# Patient Record
Sex: MALE | Race: WHITE | NOT HISPANIC OR LATINO | URBAN - METROPOLITAN AREA
[De-identification: names, ages, dates, MRNs, and addresses within clinical notes are randomized per-mention and may not be internally consistent; named-entity substitution may affect disease eponyms.]

---
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
## 2024-01-10 NOTE — ED PEDIATRIC NURSE NOTE - NSSISCREENINGQ4_ED_A_ED
No Patient is a 71y old  Female who presents with a chief complaint of UTI (10 Manuel 2024 08:07)    Patient seen and examined with family member at bedside. No acute overnight events. Mid-morning, patient's family member noted a faint rash at her neck/upper chest which felt similar to a rash she got after receiving PCN with ambulatory surgery many years ago - relayed to ID. Denies itching, SOB, trouble breathing/swallowing or any other concerns. She was able to keep down jello and ginger ale but still has persistent nausea. Family member reports that patient tends to have N/V when she is sick.    ALLERGIES:  penicillins (Hives)  Ativan (Rash; Urticaria)    MEDICATIONS  (STANDING):  cefTRIAXone   IVPB 1000 milliGRAM(s) IV Intermittent every 24 hours  dextrose 5%. 1000 milliLiter(s) (50 mL/Hr) IV Continuous <Continuous>  dextrose 5%. 1000 milliLiter(s) (100 mL/Hr) IV Continuous <Continuous>  dextrose 50% Injectable 25 Gram(s) IV Push once  dextrose 50% Injectable 25 Gram(s) IV Push once  dextrose 50% Injectable 12.5 Gram(s) IV Push once  famotidine  IVPB 20 milliGRAM(s) IV Intermittent daily  furosemide   Injectable 20 milliGRAM(s) IV Push two times a day  glucagon  Injectable 1 milliGRAM(s) IntraMuscular once  heparin   Injectable 5000 Unit(s) SubCutaneous every 8 hours  insulin glargine Injectable (LANTUS) 26 Unit(s) SubCutaneous at bedtime  insulin lispro (ADMELOG) corrective regimen sliding scale   SubCutaneous at bedtime  insulin lispro (ADMELOG) corrective regimen sliding scale   SubCutaneous three times a day before meals  lactated ringers. 1000 milliLiter(s) (75 mL/Hr) IV Continuous <Continuous>  levothyroxine Injectable 56 MICROGram(s) IV Push at bedtime    MEDICATIONS  (PRN):  dextrose Oral Gel 15 Gram(s) Oral once PRN Blood Glucose LESS THAN 70 milliGRAM(s)/deciliter  trimethobenzamide Injectable 200 milliGRAM(s) IntraMuscular every 8 hours PRN Nausea and/or Vomiting    Vital Signs Last 24 Hrs  T(F): 98.3 (10 Manuel 2024 12:49), Max: 98.4 (09 Jan 2024 23:58)  HR: 66 (10 Manuel 2024 12:49) (58 - 75)  BP: 116/61 (10 Manuel 2024 12:49) (116/61 - 151/75)  RR: 18 (10 Manuel 2024 12:49) (16 - 18)  SpO2: 95% (10 Manuel 2024 12:49) (94% - 99%)    I&O's Summary    BMI (kg/m2): 44.6 (01-08-24 @ 22:24), 44.6 (01-08-24 @ 00:20)    PHYSICAL EXAM:  General: NAD, lying in bed  Eyes: Anicteric  ENT: Moist mucous membranes  Neck: Supple, No JVD  Lungs: Clear to auscultation bilaterally, normal respiratory effort  Cardio: RRR, S1/S2, No murmurs  Abdomen: Soft, Nontender, Nondistended; Bowel sounds present  Extremities: No calf tenderness, No pitting edema, no digital cyanosis  Skin: Warm, dry  Psych: A&O x 3, follows commands    LABS:                        12.0   14.39 )-----------( 185      ( 10 Manuel 2024 05:55 )             37.5       01-10    134  |  105  |  59  ----------------------------<  201  4.4   |  22  |  2.70    Ca    8.6      10 Manuel 2024 05:55  Phos  4.5     01-09  Mg     2.5     01-10    TPro  7.4  /  Alb  3.2  /  TBili  0.5  /  DBili  x   /  AST  19  /  ALT  14  /  AlkPhos  69  01-10       PT/INR - ( 10 Manuel 2024 08:37 )   PT: 13.0 sec;   INR: 1.11 ratio         PTT - ( 10 Manuel 2024 08:37 )  PTT:21.5 sec   Lactate, Blood: 1.6 mmol/L (01-09 @ 01:23)        POCT Blood Glucose.: 208 mg/dL (10 Manuel 2024 12:28)  POCT Blood Glucose.: 194 mg/dL (10 Manuel 2024 08:23)  POCT Blood Glucose.: 248 mg/dL (09 Jan 2024 22:31)  POCT Blood Glucose.: 192 mg/dL (09 Jan 2024 17:32)      Culture - Blood (collected 09 Jan 2024 01:23)  Source: .Blood Blood  Preliminary Report (10 Manuel 2024 07:02):    No growth at 24 hours    Culture - Blood (collected 09 Jan 2024 01:18)  Source: .Blood Blood  Preliminary Report (10 Manuel 2024 07:02):    No growth at 24 hours    Culture - Urine (collected 08 Jan 2024 04:09)  Source: Clean Catch Clean Catch (Midstream)  Preliminary Report (09 Jan 2024 15:39):    >100,000 CFU/ml Klebsiella pneumoniae          RADIOLOGY & ADDITIONAL TESTS:   Personally reviewed.     Consultant(s) Notes Reviewed:  [x] YES  [ ] NO    Care Discussed with Consultants/Other Providers:

## 2024-03-12 ENCOUNTER — NON-APPOINTMENT (OUTPATIENT)
Age: 19
End: 2024-03-12

## 2024-03-13 ENCOUNTER — EMERGENCY (EMERGENCY)
Facility: HOSPITAL | Age: 19
LOS: 1 days | Discharge: HOME | End: 2024-03-13
Attending: EMERGENCY MEDICINE | Admitting: EMERGENCY MEDICINE
Payer: COMMERCIAL

## 2024-03-13 VITALS
HEART RATE: 96 BPM | RESPIRATION RATE: 18 BRPM | OXYGEN SATURATION: 99 % | HEIGHT: 70.5 IN | WEIGHT: 190.04 LBS | TEMPERATURE: 97 F | SYSTOLIC BLOOD PRESSURE: 136 MMHG | DIASTOLIC BLOOD PRESSURE: 88 MMHG

## 2024-03-13 LAB
ALBUMIN SERPL ELPH-MCNC: 4.5 G/DL — SIGNIFICANT CHANGE UP (ref 3.3–5)
ALP SERPL-CCNC: 84 U/L — SIGNIFICANT CHANGE UP (ref 40–120)
ALT FLD-CCNC: 28 U/L — SIGNIFICANT CHANGE UP (ref 10–45)
ANION GAP SERPL CALC-SCNC: 9 MMOL/L — SIGNIFICANT CHANGE UP (ref 5–17)
APAP SERPL-MCNC: <1 UG/ML — LOW (ref 10–30)
AST SERPL-CCNC: 23 U/L — SIGNIFICANT CHANGE UP (ref 10–40)
BASOPHILS # BLD AUTO: 0.08 K/UL — SIGNIFICANT CHANGE UP (ref 0–0.2)
BASOPHILS NFR BLD AUTO: 0.7 % — SIGNIFICANT CHANGE UP (ref 0–2)
BILIRUB SERPL-MCNC: 0.7 MG/DL — SIGNIFICANT CHANGE UP (ref 0.2–1.2)
BUN SERPL-MCNC: 18 MG/DL — SIGNIFICANT CHANGE UP (ref 7–23)
CALCIUM SERPL-MCNC: 9.4 MG/DL — SIGNIFICANT CHANGE UP (ref 8.4–10.5)
CHLORIDE SERPL-SCNC: 105 MMOL/L — SIGNIFICANT CHANGE UP (ref 96–108)
CO2 SERPL-SCNC: 28 MMOL/L — SIGNIFICANT CHANGE UP (ref 22–31)
CREAT SERPL-MCNC: 0.87 MG/DL — SIGNIFICANT CHANGE UP (ref 0.5–1.3)
EGFR: 127 ML/MIN/1.73M2 — SIGNIFICANT CHANGE UP
EOSINOPHIL # BLD AUTO: 0.18 K/UL — SIGNIFICANT CHANGE UP (ref 0–0.5)
EOSINOPHIL NFR BLD AUTO: 1.6 % — SIGNIFICANT CHANGE UP (ref 0–6)
ETHANOL SERPL-MCNC: <3 MG/DL — SIGNIFICANT CHANGE UP (ref 0–3)
GLUCOSE SERPL-MCNC: 98 MG/DL — SIGNIFICANT CHANGE UP (ref 70–99)
HCT VFR BLD CALC: 48.4 % — SIGNIFICANT CHANGE UP (ref 39–50)
HGB BLD-MCNC: 17.4 G/DL — HIGH (ref 13–17)
IMM GRANULOCYTES NFR BLD AUTO: 0.5 % — SIGNIFICANT CHANGE UP (ref 0–0.9)
LYMPHOCYTES # BLD AUTO: 2.76 K/UL — SIGNIFICANT CHANGE UP (ref 1–3.3)
LYMPHOCYTES # BLD AUTO: 23.8 % — SIGNIFICANT CHANGE UP (ref 13–44)
MCHC RBC-ENTMCNC: 31.3 PG — SIGNIFICANT CHANGE UP (ref 27–34)
MCHC RBC-ENTMCNC: 36 GM/DL — SIGNIFICANT CHANGE UP (ref 32–36)
MCV RBC AUTO: 87.1 FL — SIGNIFICANT CHANGE UP (ref 80–100)
MONOCYTES # BLD AUTO: 0.71 K/UL — SIGNIFICANT CHANGE UP (ref 0–0.9)
MONOCYTES NFR BLD AUTO: 6.1 % — SIGNIFICANT CHANGE UP (ref 2–14)
NEUTROPHILS # BLD AUTO: 7.8 K/UL — HIGH (ref 1.8–7.4)
NEUTROPHILS NFR BLD AUTO: 67.3 % — SIGNIFICANT CHANGE UP (ref 43–77)
NRBC # BLD: 0 /100 WBCS — SIGNIFICANT CHANGE UP (ref 0–0)
PLATELET # BLD AUTO: 246 K/UL — SIGNIFICANT CHANGE UP (ref 150–400)
POTASSIUM SERPL-MCNC: 4 MMOL/L — SIGNIFICANT CHANGE UP (ref 3.5–5.3)
POTASSIUM SERPL-SCNC: 4 MMOL/L — SIGNIFICANT CHANGE UP (ref 3.5–5.3)
PROT SERPL-MCNC: 7.3 G/DL — SIGNIFICANT CHANGE UP (ref 6–8.3)
RBC # BLD: 5.56 M/UL — SIGNIFICANT CHANGE UP (ref 4.2–5.8)
RBC # FLD: 11.1 % — SIGNIFICANT CHANGE UP (ref 10.3–14.5)
SALICYLATES SERPL-MCNC: 2 MG/DL — LOW (ref 3–30)
SODIUM SERPL-SCNC: 142 MMOL/L — SIGNIFICANT CHANGE UP (ref 135–145)
WBC # BLD: 11.59 K/UL — HIGH (ref 3.8–10.5)
WBC # FLD AUTO: 11.59 K/UL — HIGH (ref 3.8–10.5)

## 2024-03-13 PROCEDURE — 80053 COMPREHEN METABOLIC PANEL: CPT

## 2024-03-13 PROCEDURE — 90792 PSYCH DIAG EVAL W/MED SRVCS: CPT | Mod: 95,GC

## 2024-03-13 PROCEDURE — 96374 THER/PROPH/DIAG INJ IV PUSH: CPT

## 2024-03-13 PROCEDURE — 73130 X-RAY EXAM OF HAND: CPT

## 2024-03-13 PROCEDURE — 80307 DRUG TEST PRSMV CHEM ANLYZR: CPT

## 2024-03-13 PROCEDURE — 85025 COMPLETE CBC W/AUTO DIFF WBC: CPT

## 2024-03-13 PROCEDURE — 73130 X-RAY EXAM OF HAND: CPT | Mod: 26,RT

## 2024-03-13 PROCEDURE — 99285 EMERGENCY DEPT VISIT HI MDM: CPT

## 2024-03-13 PROCEDURE — 96375 TX/PRO/DX INJ NEW DRUG ADDON: CPT

## 2024-03-13 PROCEDURE — 99284 EMERGENCY DEPT VISIT MOD MDM: CPT | Mod: 25

## 2024-03-13 PROCEDURE — 36415 COLL VENOUS BLD VENIPUNCTURE: CPT

## 2024-03-13 RX ORDER — HALOPERIDOL DECANOATE 100 MG/ML
2 INJECTION INTRAMUSCULAR ONCE
Refills: 0 | Status: COMPLETED | OUTPATIENT
Start: 2024-03-13 | End: 2024-03-13

## 2024-03-13 RX ADMIN — HALOPERIDOL DECANOATE 2 MILLIGRAM(S): 100 INJECTION INTRAMUSCULAR at 20:12

## 2024-03-13 RX ADMIN — Medication 1 MILLIGRAM(S): at 19:42
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
## 2024-03-13 NOTE — ED BEHAVIORAL HEALTH ASSESSMENT NOTE - DESCRIPTION
none Mother gave him xanax 0.25mg prior to arrival, and then upon arrival was given ativan 1mg IV and later haldol 2mg IV.      Vital Signs Last 24 Hrs  T(C): 36.3 (13 Mar 2024 18:42), Max: 36.3 (13 Mar 2024 18:42)  T(F): 97.3 (13 Mar 2024 18:42), Max: 97.3 (13 Mar 2024 18:42)  HR: 96 (13 Mar 2024 18:42) (96 - 96)  BP: 136/88 (13 Mar 2024 18:42) (136/88 - 136/88)  BP(mean): --  RR: 18 (13 Mar 2024 18:42) (18 - 18)  SpO2: 99% (13 Mar 2024 18:42) (99% - 99%)    Parameters below as of 13 Mar 2024 18:42  Patient On (Oxygen Delivery Method): room air see hpi no significant PMHx per pt or parents

## 2024-03-13 NOTE — ED ADULT TRIAGE NOTE - SOURCE OF INFORMATION
Subjective:   Antonio Briones is a 22 y.o. male who presents for Leg Injury (L calf muscle )      HPI: This is a 19-year-old male who presents today for work note.  Patient reports that he developed pain to his left lower leg 2 days ago after carrying armoire up a set of stairs.  Patient reports pain centered over left calf muscle.  He reports having to call into work secondary to pain.  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  He denies pain today.  No numbness or tingling to lower extremity.  Patient reports full resolution in pain.      ROS per HPI    Medications:    Current Outpatient Medications on File Prior to Visit   Medication Sig Dispense Refill    ondansetron (ZOFRAN ODT) 4 MG TABLET DISPERSIBLE Take 1 Tablet by mouth every 6 hours as needed for Nausea/Vomiting for up to 15 doses. (Patient not taking: Reported on 3/12/2023) 15 Tablet 0    ondansetron (ZOFRAN ODT) 4 MG TABLET DISPERSIBLE Take 1 Tablet by mouth every 6 hours as needed for Nausea. (Patient not taking: Reported on 1/18/2023) 10 Tablet 0    famotidine (PEPCID) 20 MG Tab Take 1 Tablet by mouth 2 times a day. (Patient not taking: Reported on 1/18/2023) 60 Tablet 0     No current facility-administered medications on file prior to visit.        Allergies:   Patient has no known allergies.    Problem List:   There is no problem list on file for this patient.       Surgical History:  No past surgical history on file.    Past Social Hx:   Social History     Tobacco Use    Smoking status: Every Day     Packs/day: 0.20     Years: 2.00     Pack years: 0.40     Types: Cigarettes    Smokeless tobacco: Never   Vaping Use    Vaping Use: Never used   Substance Use Topics    Alcohol use: Yes     Alcohol/week: 10.8 oz     Types: 14 Glasses of wine, 4 Shots of liquor per  "week    Drug use: Not Currently     Frequency: 7.0 times per week     Types: Marijuana, Inhaled     Comment: marijuana, cocaine occ          Problem list, medications, and allergies reviewed by myself today in Epic.     Objective:     /74 (BP Location: Left arm, Patient Position: Sitting, BP Cuff Size: Adult)   Pulse (!) 103   Temp 37 °C (98.6 °F) (Temporal)   Resp 16   Ht 1.753 m (5' 9\")   Wt 81.6 kg (180 lb)   SpO2 97%   BMI 26.58 kg/m²     Physical Exam  Vitals and nursing note reviewed.   Constitutional:       General: He is not in acute distress.     Appearance: Normal appearance. He is normal weight. He is not ill-appearing, toxic-appearing or diaphoretic.   HENT:      Head: Normocephalic and atraumatic.   Cardiovascular:      Rate and Rhythm: Normal rate and regular rhythm.      Pulses: Normal pulses.      Heart sounds: Normal heart sounds.   Pulmonary:      Effort: Pulmonary effort is normal. No respiratory distress.      Breath sounds: Normal breath sounds. No stridor. No wheezing, rhonchi or rales.   Chest:      Chest wall: No tenderness.   Musculoskeletal:      Cervical back: Normal range of motion.      Left lower leg: No swelling, deformity, lacerations, tenderness or bony tenderness. No edema.   Skin:     General: Skin is warm and dry.      Capillary Refill: Capillary refill takes less than 2 seconds.   Neurological:      General: No focal deficit present.      Mental Status: He is alert and oriented to person, place, and time. Mental status is at baseline.      Motor: No weakness.      Gait: Gait normal.   Psychiatric:         Mood and Affect: Mood normal.         Behavior: Behavior normal.         Thought Content: Thought content normal.         Judgment: Judgment normal.       Assessment/Plan:     Diagnosis and associated orders:   1. Muscle strain               Comments/MDM:   Pt is clinically stable at today's acute urgent care visit.  No acute distress noted. Appropriate for " outpatient management at this time.     Acute problem.  Physical exam is unremarkable today.  Patient reports full resolution in his pain after resting, elevating, ice application, and taking ibuprofen.  Work note provided per patient request. Patient is to return to   for any new or worsening signs or symptoms, and follow with with PCP for recheck. Patient is agreeable with plan of care and verbalizes good understanding.             Discussed DDx, management options (risks,benefits, and alternatives to planned treatment), natural progression and supportive care.  Expressed understanding and the treatment plan was agreed upon. Questions were encouraged and answered   Return to urgent care prn if new or worsening sx or if there is no improvement in condition prn.    Educated in Red flags and indications to immediately call 911 or present to the Emergency Department.   Advised the patient to follow-up with the primary care physician for recheck, reevaluation, and consideration of further management.    I personally reviewed prior external notes and test results pertinent to today's visit.  I have independently reviewed and interpreted all diagnostics ordered during this urgent care acute visit.     Please note that this dictation was created using voice recognition software. I have made a reasonable attempt to correct obvious errors, but I expect that there are errors of grammar and possibly content that I did not discover before finalizing the note.    This note was electronically signed by EHSAN Rose     Patient/Mother/Father

## 2024-03-13 NOTE — ED ADULT NURSE NOTE - OBJECTIVE STATEMENT
Patient came from home with complaint of R hand pain after punching his hand on a computer screen. Patient complaint of R hand pain. Patient mom gave 0.25mg Xanax. PMH of autism

## 2024-03-13 NOTE — ED ADULT NURSE NOTE - NSFALLUNIVINTERV_ED_ALL_ED
Bed/Stretcher in lowest position, wheels locked, appropriate side rails in place/Call bell, personal items and telephone in reach/Instruct patient to call for assistance before getting out of bed/chair/stretcher/Non-slip footwear applied when patient is off stretcher/Tye to call system/Physically safe environment - no spills, clutter or unnecessary equipment/Purposeful proactive rounding/Room/bathroom lighting operational, light cord in reach

## 2024-03-13 NOTE — ED PROVIDER NOTE - CARE PLAN
1 Principal Discharge DX:	Autism  Secondary Diagnosis:	Encounter for medication management in attention deficit hyperactivity disorder (ADHD)
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
## 2024-03-13 NOTE — ED BEHAVIORAL HEALTH ASSESSMENT NOTE - DETAILS
denies any recent or remote SI, plan or intent.  denies any past SAs or NSSIB spoke with ED attending spoke with parents

## 2024-03-13 NOTE — ED ADULT TRIAGE NOTE - CHIEF COMPLAINT QUOTE
As per parents pt had a break down where he was angry and punched the computer screen. Pt c/o right hand pain. Mom states pt has autism. Mom gave pt .25mg xanax.

## 2024-03-13 NOTE — ED PROVIDER NOTE - PHYSICAL EXAMINATION
General:     NAD, well-nourished, well-appearing  Head:     NC/AT, EOMI, oral mucosa moist  Neck:     supple  Lungs:     CTA b/l, no w/r/r  CVS:     S1S2, RRR, no m/g/r  Abd:     +BS, s/nt/nd, no organomegaly  Ext:    2+ radial and pedal pulses, no c/c/e  right hand no gross deformity , minor abrasions on dorsal aspect   Neuro: grossly intact
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
## 2024-03-13 NOTE — ED PROVIDER NOTE - PATIENT PORTAL LINK FT
You can access the FollowMyHealth Patient Portal offered by [facility] by registering at the following website: [email or web address]. By joining [facility]’s FollowMyHealth portal, you will also be able to view your health information using other applications (apps) compatible with our system.

## 2024-03-14 ENCOUNTER — OUTPATIENT (OUTPATIENT)
Dept: OUTPATIENT SERVICES | Facility: HOSPITAL | Age: 19
LOS: 1 days | Discharge: STILL ADMITTED OR ADMINISTRATIVE | End: 2024-03-14
Payer: COMMERCIAL

## 2024-03-14 VITALS
SYSTOLIC BLOOD PRESSURE: 121 MMHG | DIASTOLIC BLOOD PRESSURE: 74 MMHG | RESPIRATION RATE: 17 BRPM | OXYGEN SATURATION: 97 % | HEART RATE: 78 BPM

## 2024-03-14 DIAGNOSIS — F90.9 ATTENTION-DEFICIT HYPERACTIVITY DISORDER, UNSPECIFIED TYPE: ICD-10-CM

## 2024-03-14 DIAGNOSIS — F84.0 AUTISTIC DISORDER: ICD-10-CM

## 2024-03-14 PROCEDURE — 99214 OFFICE O/P EST MOD 30 MIN: CPT

## 2024-03-14 NOTE — CHART NOTE - NSCHARTNOTEFT_GEN_A_CORE
19 y/o male with h/o Autism BIB family c/o he is very angry and agitated and restless for few hours, he punched his computer and smashed it , and injured his right hand . Mother gave him 0.25 mg Xanax before arrival as per chart.  Patient was cleared by Tele Psych and recommendation made to follow up with his outpatient services and [facility] for additional support.  The patient's mother asked for assistance in finding a therapist pending the  setup of [facility] services.  Mother, Skye was agreeable to a scheduled appointment at [facility] today 3/14/24 @ 2:15.

## 2024-03-14 NOTE — ED BEHAVIORAL HEALTH PROGRESS NOTE - BILLING CODES
Severe Malnutrition in the context of Chronic Illness/Injury    Related to (etiology):  cancer    Signs and Symptoms (as evidenced by):  Energy Intake: <75% of estimated energy requirement for > 3 months  Body Fat Depletion: moderate depletion of orbitals, triceps and thoracic and lumbar region   Muscle Mass Depletion: severe depletion of temples, clavicle region, scapular region, interosseous muscle and lower extremities   Weight Loss:12% x 3 months     Interventions:  Collaboration with other providers    Nutrition Diagnosis Status:  New      
27574-[facility] hospital care - moderate complexity 30-39 minutes

## 2024-03-14 NOTE — ED BEHAVIORAL HEALTH PROGRESS NOTE - DETAILS:
Patient seen this morning. He is awake and alert, very happy appearing, no distress. Father is at bedside. Confirms pt is currently at his baseline and does not have any safety concerns w/ his returning home. Pt denies any complaints currently.

## 2024-03-14 NOTE — ED BEHAVIORAL HEALTH PROGRESS NOTE - SUMMARY
17yo male domiciled with mother and father, has PPHx of autism, ADHD, no past psychiatric hospitalizations, no past SAs, no past NSSIB; + hx of physical aggression (punching, kicking) toward parents when he is frustrated, however no hx of intentional violence, no past legal issues.  Currently in outpt treatment with developmental pediatrician Dr. Kasper, but concurrently seeing a psychiatrist Dr. Alonso, is taking Lithium, Seroquel, Pristiq, and xanax.  Patient was BIB parents after he punched his computer monitor due to frustration with computer issues.  Mother gave him xanax 0.25mg prior to arrival, and then upon arrival was given ativan 1mg IV and later haldol 2mg IV.    On evaluation patient is sedated secondary to effects of ativan and haldol.  He is able to participate in some parts of the interview and he does not appear acutely manic or psychotic; he also denies any suicidal or homicidal ideations, plans or intent.  He denies any suicidal or self-harm intent when he punched the computer or window.  He also denies any homicidal intent or aggressive ideations or plans to harm others.  He has chronic impulsivity and low frustration tolerance secondary to autism and ADHD, and his behaviors today are not any change from his baseline.  He has responded well to his current outpatient regimen, however he still is getting dysregulated and having outbursts about once per week.  Although he has a hx of aggression toward people (his family), he has not engaged in this in several months (last time was August 2023). he is engaged in treatment with a developmental pediatrician and is enrolled in [facility], however services have not been started yet.  Although patient has intermittent outbursts that place him and others at harm, these are chronic and related to impulsivity, and would not be amenable to inpatient psychiatric hospitalization.  Overall he would not benefit from inpatient psychiatric treatment due to his autism spectrum symptoms including difficulty tolerating groups of people, new environments, changes in his structure, having to share living space, staying in a locked-in environment, etc.  For these reasons, in addition to the nature of his outbursts being sporadic and not persistent, patient would benefit more from returning home and to outpatient providers, with plan to followup with [facility] for further treatment.  At this time however, he is not able to fully engage in meaningful interview secondary to sedation from medications.  He will need to hold in the ED, metabolize medications, and be reevaluated by psychiatry.

## 2024-03-14 NOTE — ED BEHAVIORAL HEALTH PROGRESS NOTE - LACKING INFO FOR PSYCH DISPO DETAILS FREE TEXT
ensure no events overnight, patient remains calm and has returned to baseline. Then discuss w/ parents possibility of returning home.

## 2024-03-14 NOTE — ED BEHAVIORAL HEALTH PROGRESS NOTE - REFERRAL / APPOINTMENT DETAILS
will provide [facility] crisis clinic information. Pt is already connected to outpatient services including [facility].

## 2024-03-14 NOTE — ED BEHAVIORAL HEALTH PROGRESS NOTE - CASE SUMMARY/FORMULATION (CLEARLY DOCUMENT RATIONALE FOR DISPOSITION CHANGE)
As noted previously, this is a patient with diagnosed Autism and prior behavioral episodes and aggressive outbursts in the context of poor frustration tolerance and impulsivity. He well connected to outpatient care including recently being accepted into [facility] (though pending those services). He is on medications at home to help control his symptoms. Yesterday's incident does not appear to be an acute change from pt's baseline but rather in the context of ongoing difficulties with symptom control as an outpatient. However, pt's mood has returned to baseline, and he is currently calm in behavioral control, and does not present any acute danger. Therefore he would not benefit from psychiatric hospitalization at this time and can follow up w/ his outpatient services. We can also provide referral to [facility] for additional support. As noted previously, this is a patient with diagnosed Autism and prior behavioral episodes and aggressive outbursts in the context of poor frustration tolerance and impulsivity. He well connected to outpatient care including recently being accepted into [facility] (though pending those services). He is on medications at home to help control his symptoms. Yesterday's incident does not appear to be an acute change from pt's baseline but rather in the context of ongoing difficulties with symptom control as an outpatient. However, pt's mood has returned to baseline, and he is currently calm in behavioral control, and does not present any acute danger. Therefore he would not benefit from psychiatric hospitalization at this time and can follow up w/ his outpatient services. We can also provide referral to [facility] for additional support.

## 2024-03-14 NOTE — ED BEHAVIORAL HEALTH PROGRESS NOTE - DETAILS
pt with limited ability to participate meaningfully. Has supportive and involved parents - parents provided with thorough psychoeducation and about crisis management. d/w parents

## 2024-03-21 ENCOUNTER — EMERGENCY (EMERGENCY)
Facility: HOSPITAL | Age: 19
LOS: 1 days | Discharge: HOME | End: 2024-03-21
Attending: EMERGENCY MEDICINE | Admitting: EMERGENCY MEDICINE
Payer: COMMERCIAL

## 2024-03-21 VITALS
SYSTOLIC BLOOD PRESSURE: 135 MMHG | HEART RATE: 95 BPM | HEIGHT: 70.5 IN | TEMPERATURE: 98 F | RESPIRATION RATE: 18 BRPM | DIASTOLIC BLOOD PRESSURE: 86 MMHG | WEIGHT: 179.9 LBS | OXYGEN SATURATION: 98 %

## 2024-03-21 PROCEDURE — 99284 EMERGENCY DEPT VISIT MOD MDM: CPT

## 2024-03-21 PROCEDURE — 99283 EMERGENCY DEPT VISIT LOW MDM: CPT

## 2024-03-21 PROCEDURE — 73140 X-RAY EXAM OF FINGER(S): CPT | Mod: 26,RT

## 2024-03-21 PROCEDURE — 73140 X-RAY EXAM OF FINGER(S): CPT

## 2024-03-21 NOTE — ED PROVIDER NOTE - NSFOLLOWUPINSTRUCTIONS_ED_ALL_ED_FT
-Continue your medications as prescribed by your psychiatrist  -Return to any emergency room for worsening symptoms or for feelings of suicide or self-harm or hurting others  -Follow-up with your psychiatrist and psychologist in the next 1 to 2 days    -Take Advil as needed for finger pain

## 2024-03-21 NOTE — ED PROVIDER NOTE - PHYSICAL EXAMINATION
exam:   General: well appearing, NAD. calm coherent, cooperative  HEENT: eyes perrl, nose normal, head without swelling/tenderness/ discoloration or other signs of trauma  cor: RRR, s1s2, 2+rad pulses.   lungs: ctabl, no resp distress.   abd: soft, ntnd.   neuro: a&ox3, cn2-12 intact, JACOME, 5/5 strength c nl sensation all extremities, nl coordination.   MSK: no extremity swelling. mild R 1st mcp joint tenderness. FROM without discomfort. FROM ip/mcp  Skin: normal, no rash

## 2024-03-21 NOTE — ED PROVIDER NOTE - PATIENT PORTAL LINK FT
You can access the FollowMyHealth Patient Portal offered by [facility] by registering at the following website: [email or web address]. By joining [employer]’s FollowMyHealth portal, you will also be able to view your health information using other applications (apps) compatible with our system.

## 2024-03-21 NOTE — ED ADULT NURSE NOTE - NSICDXPASTMEDICALHX_GEN_ALL_CORE_FT
PAST MEDICAL HISTORY:  Attention deficit hyperactivity disorder (ADHD), unspecified ADHD type     Autism     Autism

## 2024-03-21 NOTE — ED ADULT NURSE NOTE - NSFALLUNIVINTERV_ED_ALL_ED
Bed/Stretcher in lowest position, wheels locked, appropriate side rails in place/Call bell, personal items and telephone in reach/Instruct patient to call for assistance before getting out of bed/chair/stretcher/Non-slip footwear applied when patient is off stretcher/[place] to call system/Physically safe environment - no spills, clutter or unnecessary equipment/Purposeful proactive rounding/Room/bathroom lighting operational, light cord in reach
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
## 2024-03-21 NOTE — ED PROVIDER NOTE - NSICDXPASTMEDICALHX_GEN_ALL_CORE_FT
PAST MEDICAL HISTORY:  Attention deficit hyperactivity disorder (ADHD), unspecified ADHD type     Autism     Autism     
RX MVI, Vit C
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
## 2024-04-11 DIAGNOSIS — F84.0 AUTISTIC DISORDER: ICD-10-CM

## 2024-04-11 DIAGNOSIS — F41.1 GENERALIZED ANXIETY DISORDER: ICD-10-CM

## 2024-10-20 ENCOUNTER — INPATIENT (INPATIENT)
Facility: HOSPITAL | Age: 19
LOS: 53 days | Discharge: HOME | DRG: 883 | End: 2024-12-13
Attending: INTERNAL MEDICINE | Admitting: INTERNAL MEDICINE
Payer: COMMERCIAL

## 2024-10-20 VITALS
DIASTOLIC BLOOD PRESSURE: 89 MMHG | WEIGHT: 205.03 LBS | HEART RATE: 140 BPM | SYSTOLIC BLOOD PRESSURE: 120 MMHG | OXYGEN SATURATION: 97 % | TEMPERATURE: 98 F | RESPIRATION RATE: 20 BRPM | HEIGHT: 70 IN

## 2024-10-20 DIAGNOSIS — F84.0 AUTISTIC DISORDER: ICD-10-CM

## 2024-10-20 LAB
ALBUMIN SERPL ELPH-MCNC: 4.7 G/DL — SIGNIFICANT CHANGE UP (ref 3.3–5)
ALP SERPL-CCNC: 115 U/L — SIGNIFICANT CHANGE UP (ref 40–120)
ALT FLD-CCNC: 25 U/L — SIGNIFICANT CHANGE UP (ref 10–45)
ANION GAP SERPL CALC-SCNC: 11 MMOL/L — SIGNIFICANT CHANGE UP (ref 5–17)
APAP SERPL-MCNC: <1 UG/ML — LOW (ref 10–30)
AST SERPL-CCNC: 25 U/L — SIGNIFICANT CHANGE UP (ref 10–40)
BASOPHILS # BLD AUTO: 0.06 K/UL — SIGNIFICANT CHANGE UP (ref 0–0.2)
BASOPHILS NFR BLD AUTO: 0.6 % — SIGNIFICANT CHANGE UP (ref 0–2)
BILIRUB SERPL-MCNC: 0.8 MG/DL — SIGNIFICANT CHANGE UP (ref 0.2–1.2)
BUN SERPL-MCNC: 12 MG/DL — SIGNIFICANT CHANGE UP (ref 7–23)
CALCIUM SERPL-MCNC: 9.4 MG/DL — SIGNIFICANT CHANGE UP (ref 8.4–10.5)
CHLORIDE SERPL-SCNC: 104 MMOL/L — SIGNIFICANT CHANGE UP (ref 96–108)
CO2 SERPL-SCNC: 27 MMOL/L — SIGNIFICANT CHANGE UP (ref 22–31)
CREAT SERPL-MCNC: 1 MG/DL — SIGNIFICANT CHANGE UP (ref 0.5–1.3)
EGFR: 110 ML/MIN/1.73M2 — SIGNIFICANT CHANGE UP
EOSINOPHIL # BLD AUTO: 0.02 K/UL — SIGNIFICANT CHANGE UP (ref 0–0.5)
EOSINOPHIL NFR BLD AUTO: 0.2 % — SIGNIFICANT CHANGE UP (ref 0–6)
ETHANOL SERPL-MCNC: <3 MG/DL — SIGNIFICANT CHANGE UP (ref 0–3)
GLUCOSE SERPL-MCNC: 113 MG/DL — HIGH (ref 70–99)
HCT VFR BLD CALC: 47.8 % — SIGNIFICANT CHANGE UP (ref 39–50)
HGB BLD-MCNC: 16.8 G/DL — SIGNIFICANT CHANGE UP (ref 13–17)
IMM GRANULOCYTES NFR BLD AUTO: 0.3 % — SIGNIFICANT CHANGE UP (ref 0–0.9)
LYMPHOCYTES # BLD AUTO: 0.99 K/UL — LOW (ref 1–3.3)
LYMPHOCYTES # BLD AUTO: 10.3 % — LOW (ref 13–44)
MCHC RBC-ENTMCNC: 31.2 PG — SIGNIFICANT CHANGE UP (ref 27–34)
MCHC RBC-ENTMCNC: 35.1 GM/DL — SIGNIFICANT CHANGE UP (ref 32–36)
MCV RBC AUTO: 88.8 FL — SIGNIFICANT CHANGE UP (ref 80–100)
MONOCYTES # BLD AUTO: 0.89 K/UL — SIGNIFICANT CHANGE UP (ref 0–0.9)
MONOCYTES NFR BLD AUTO: 9.3 % — SIGNIFICANT CHANGE UP (ref 2–14)
NEUTROPHILS # BLD AUTO: 7.63 K/UL — HIGH (ref 1.8–7.4)
NEUTROPHILS NFR BLD AUTO: 79.3 % — HIGH (ref 43–77)
NRBC # BLD: 0 /100 WBCS — SIGNIFICANT CHANGE UP (ref 0–0)
PLATELET # BLD AUTO: 216 K/UL — SIGNIFICANT CHANGE UP (ref 150–400)
POTASSIUM SERPL-MCNC: 4.4 MMOL/L — SIGNIFICANT CHANGE UP (ref 3.5–5.3)
POTASSIUM SERPL-SCNC: 4.4 MMOL/L — SIGNIFICANT CHANGE UP (ref 3.5–5.3)
PROT SERPL-MCNC: 7.4 G/DL — SIGNIFICANT CHANGE UP (ref 6–8.3)
RBC # BLD: 5.38 M/UL — SIGNIFICANT CHANGE UP (ref 4.2–5.8)
RBC # FLD: 11.6 % — SIGNIFICANT CHANGE UP (ref 10.3–14.5)
SALICYLATES SERPL-MCNC: <0.2 MG/DL — LOW (ref 3–30)
SARS-COV-2 RNA SPEC QL NAA+PROBE: SIGNIFICANT CHANGE UP
SODIUM SERPL-SCNC: 142 MMOL/L — SIGNIFICANT CHANGE UP (ref 135–145)
WBC # BLD: 9.62 K/UL — SIGNIFICANT CHANGE UP (ref 3.8–10.5)
WBC # FLD AUTO: 9.62 K/UL — SIGNIFICANT CHANGE UP (ref 3.8–10.5)

## 2024-10-20 PROCEDURE — 70450 CT HEAD/BRAIN W/O DYE: CPT | Mod: 26,MC

## 2024-10-20 PROCEDURE — 99285 EMERGENCY DEPT VISIT HI MDM: CPT

## 2024-10-20 NOTE — ED PROVIDER NOTE - NS ED ROS FT
Except as otherwise indicated in HPI:  CONSTITUTIONAL: Neg  HEENT: neg  CV: neg  Resp: neg  GI: Neg  : Neg  MSK: Neg  SKIN: Neg  NEURO: Neg  PSYCHIATRIC: +aggression, si  Heme/Onc: Neg
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
## 2024-10-20 NOTE — ED PROVIDER NOTE - PHYSICAL EXAMINATION
Gen:  alert, awake, no acute distress  Head:  atraumatic, normocephalic  HEENT: PERRLA, EOMI, normal nose, normal oropharynx, no tonsillar edema, erythema, or exudate  CV:    Regular, tachycardic, nl S1, S2, no m/r/g  Pulm:  lungs CTA b/l  Abd: s/nt/nd, +BS  MSK:  moving all extremities, no back midline ttp, no stepoffs, no cva TTP  Neuro:  grossly intact, no focal deficits  Skin:  clear, dry, intact  Psych: AOx3, normal affect, no apparent risk to self or others, no pressured speech
Patient expressed no known problems or needs

## 2024-10-20 NOTE — ED ADULT NURSE NOTE - NSFALLUNIVINTERV_ED_ALL_ED
Bed/Stretcher in lowest position, wheels locked, appropriate side rails in place/Call bell, personal items and telephone in reach/Instruct patient to call for assistance before getting out of bed/chair/stretcher/Non-slip footwear applied when patient is off stretcher/[place] to call system/Physically safe environment - no spills, clutter or unnecessary equipment/Purposeful proactive rounding/Room/bathroom lighting operational, light cord in reach

## 2024-10-20 NOTE — ED BEHAVIORAL HEALTH ASSESSMENT NOTE - VIOLENCE PROTECTIVE FACTORS:
The following changes were made to your medications today:   Start Midodrine 5mg twice a day - 1st thing in the morning and again approximately 3PM  Wear compression socks daily and use lymphedema pump daily for 1 hour twice daily.    The following lifestyle modifications are encouraged:  Continue regular exercise at least 30 minutes daily.  Lowfat/Low Cholesterol diet advised.   Low sodium diet <2000mgs/day and fluid restrictions of <2liters/day (640z/day or 6-8 cups 8oz liquids per day).  Daily weight checks and call if you notice weight gain of 3lbs in a day or 5lbs in a week.    The following tests have been ordered:  Fasting labs prior to seeing .        Follow up with primary cardiologist,   in July.    Additional Educational Resources:  For additional resources regarding your symptoms, diagnosis, or further health information, please visit the Health Resources section on [email or web address] or the Online Health Resources section in [facility].       Residential stability/Sobriety/Engagement in treatment/Insight into violence risk and need for management/treatment
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
## 2024-10-20 NOTE — ED BEHAVIORAL HEALTH ASSESSMENT NOTE - NSPRESENTSXS_PSY_ALL_CORE
Patient reports black stools since yesterday. Patient feels generally weak. Denies abdominal pain. Patient is pale.
Impulsivity

## 2024-10-20 NOTE — ED PROVIDER NOTE - PROGRESS NOTE DETAILS
Patient was signed out pending telepsych evaluation.  They stated the patient is going to be held on the morning to like get collateral from family.  Overnight the patient remained calm with no interventions required.  Will sign out to the day team pending final telepsych recommendations Servando Attending Physician   Patient signed out to me by Dr. Watts pending psych AM reevaluation. In summary 19M with history of autism, ADHD and history of agitation/aggression presents with agitation. Psych unable to obtain collateral and is waiting for morning reassessment. Servando Attending Physician   Discussed with telepsych and patient does not meet inpatient psych criteria; however patient is unable to go back to his parents' house because his parents are concerned about his aggression. Telepsych is arranging for [facility] for crisis housing. Discussed with hospitalist and admission accepted.
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
## 2024-10-21 DIAGNOSIS — R46.89 OTHER SYMPTOMS AND SIGNS INVOLVING APPEARANCE AND BEHAVIOR: ICD-10-CM

## 2024-10-21 PROCEDURE — 99233 SBSQ HOSP IP/OBS HIGH 50: CPT

## 2024-10-21 RX ORDER — ONDANSETRON HYDROCHLORIDE 4 MG/1
4 TABLET, FILM COATED ORAL EVERY 8 HOURS
Refills: 0 | Status: DISCONTINUED | OUTPATIENT
Start: 2024-10-21 | End: 2024-11-26

## 2024-10-21 RX ORDER — SODIUM CHLORIDE 0.65 %
1 AEROSOL, SPRAY (ML) NASAL DAILY
Refills: 0 | Status: DISCONTINUED | OUTPATIENT
Start: 2024-10-21 | End: 2024-11-26

## 2024-10-21 RX ORDER — MAGNESIUM, ALUMINUM HYDROXIDE 200-225/5
30 SUSPENSION, ORAL (FINAL DOSE FORM) ORAL EVERY 4 HOURS
Refills: 0 | Status: DISCONTINUED | OUTPATIENT
Start: 2024-10-21 | End: 2024-11-26

## 2024-10-21 RX ORDER — DESVENLAFAXINE SUCCINATE 50 MG/1
100 TABLET, EXTENDED RELEASE ORAL DAILY
Refills: 0 | Status: DISCONTINUED | OUTPATIENT
Start: 2024-10-21 | End: 2024-10-22

## 2024-10-21 RX ORDER — ACETAMINOPHEN 500MG 500 MG/1
650 TABLET, COATED ORAL EVERY 6 HOURS
Refills: 0 | Status: DISCONTINUED | OUTPATIENT
Start: 2024-10-21 | End: 2024-11-26

## 2024-10-21 RX ORDER — ACETAMINOPHEN, DIPHENHYDRAMINE HCL, PHENYLEPHRINE HCL 325; 25; 5 MG/1; MG/1; MG/1
3 TABLET ORAL AT BEDTIME
Refills: 0 | Status: DISCONTINUED | OUTPATIENT
Start: 2024-10-21 | End: 2024-11-26

## 2024-10-21 RX ORDER — LITHIUM CARBONATE 300 MG/1
600 CAPSULE ORAL DAILY
Refills: 0 | Status: DISCONTINUED | OUTPATIENT
Start: 2024-10-21 | End: 2024-11-26

## 2024-10-21 RX ADMIN — DESVENLAFAXINE SUCCINATE 100 MILLIGRAM(S): 50 TABLET, EXTENDED RELEASE ORAL at 22:53

## 2024-10-21 RX ADMIN — Medication 300 MILLIGRAM(S): at 20:55

## 2024-10-21 RX ADMIN — LITHIUM CARBONATE 600 MILLIGRAM(S): 300 CAPSULE ORAL at 22:53

## 2024-10-21 RX ADMIN — Medication 1 SPRAY(S): at 22:54

## 2024-10-21 NOTE — PATIENT PROFILE ADULT - LIVING ENVIRONMENT
Date of Service: 12/17/20    CC: Right knee pain and possible deep infection    HPI: Barry Torres is a 80 y.o. male who presents with complaints of pain to right knee.  He has a history of a right knee debridement from a submeniscal cyst per his description.  This was 6 weeks ago.  His wounds have since gone on to heal.  He is followed up in the clinic with  and has an appointment scheduled for next week.  A ground-level fall and injury to his head brought him to the hospital where he was admitted and worked up for possible sepsis.  Initial aspiration of his right knee showed a cell count of 1700 but did grow back Pseudomonas and cultures.  The pain is 0/10 to the right knee and is described as dull.  The pain is made worse by palpation of the area and made better by rest and immobilization.    PMH:   Past Medical History:   Diagnosis Date   • Abnormal thyroid stimulating hormone (TSH) level    • Allergic rhinitis    • Asbestosis (HCC)    • Asthma    • Bronchitis    • Chronic diarrhea     declines eval; takes immodium once a day usually and sometimes less; see previous notes; aware of risk    • Chronic low back pain    • Chronic lung disease     asbestos related   • CKD (chronic kidney disease), stage III     sees neph, one kidney   • COPD (chronic obstructive pulmonary disease) ([facility])    • Coronary artery calcification seen on CAT scan 8/2/2016    sees cards   • Epididymitis 2009    h/o listed in chart   • GERD (gastroesophageal reflux disease)    • History of hemorrhoids    • History of skin cancer     non melanoma   • [place] (hard of hearing)     declines hearing aids   • Hypercholesteremia    • Hypertension    • Hypertriglyceridemia    • Indigestion    • Light headedness     2/2 orthostasis? now better off hctz   • Lightheadedness 6/23/2016   • Low back pain    • Nasal drainage    • Olecranon bursitis    • Orthostasis 6/23/2016    better off HCTZ   • Peripheral neuropathy    • Pleural plaque  2005     [facility] [place]   • Post-nasal drip    • Pulmonary emphysema (HCC)    • RA (rheumatoid arthritis) (HCC)     on meds, sees rheum   • Restless leg syndrome    • Rheumatoid arthritis (HCC)    • Sleep apnea     obstructive and central - not adherent to autopap   • Solitary kidney     history of kidney cyst leading to non-functioning kidney s/p nephrectomy        PSH:   Past Surgical History:   Procedure Laterality Date   • IRRIGATION & DEBRIDEMENT ORTHO Right 10/27/2020    Procedure: IRRIGATION AND DEBRIDEMENT, WOUND - KNEE OPEN;  Surgeon: Elijah Faulkner M.D.;  Location: SURGERY [facility];  Service: Orthopedics   • FLEXOR TENDON REPAIR Left 4/3/2019    Procedure: FLEXOR TENDON REPAIR- SMALL FINGER VS;  Surgeon: Marc Chowdhury M.D.;  Location: SURGERY [facility];  Service: Orthopedics   • TENDON TRANSFER Left 4/3/2019    Procedure: TENDON TRANSFER;  Surgeon: Marc Chowdhury M.D.;  Location: SURGERY [facility];  Service: Orthopedics   • COLONOSCOPY  11/10/2018    Procedure: COLONOSCOPY;  Surgeon: Ganesh Peacock M.D.;  Location: SURGERY [facility];  Service: Gastroenterology   • NASAL POLYPECTOMY Bilateral 10/6/2015    Procedure: NASAL POLYPECTOMY WITH SCOTT ENDOSCOPIC NASAL DEBRIDEMENT;  Surgeon: Param Maurer M.D.;  Location: SURGERY SAME DAY [facility];  Service:    • CYSTOSCOPY  2/1/2010    Performed by ANGIE VERDUZCO at [facility]   • RETROGRADES  2/1/2010    Performed by ANGIE VERDUZCO at [facility]   • PYELOGRAM  2/1/2010    Performed by ANGIE VERDUZCO at [facility]   • URETEROSCOPY  2/1/2010    Performed by ANGIE VERDUZCO at [facility]   • NEPHRECTOMY LAPAROSCOPIC  2009    left kidney   • TESTICLE EXPLORATION  2004   • PB REMV 2ND CATARACT,CORN-SCLER SECTN     • TONSILLECTOMY         FH:   Family History   Problem Relation Age of Onset   • Genetic Disorder Father         ALS   • No Known  "Problems Sister    • No Known Problems Son    • No Known Problems Daughter    • Heart Attack Neg Hx    • Heart Disease Neg Hx    • Heart Failure Neg Hx        SH:   Social History     Socioeconomic History   • Marital status:      Spouse name: Not on file   • Number of children: Not on file   • Years of education: Not on file   • Highest education level: Not on file   Occupational History   • Not on file   Social Needs   • Financial resource strain: Not on file   • Food insecurity     Worry: Not on file     Inability: Not on file   • Transportation needs     Medical: Not on file     Non-medical: Not on file   Tobacco Use   • Smoking status: Former Smoker     Packs/day: 1.00     Years: 40.00     Pack years: 40.00     Types: Cigarettes     Quit date: 1980     Years since quittin.9   • Smokeless tobacco: Never Used   • Tobacco comment: 1 pk a day for 40 yrs   Substance and Sexual Activity   • Alcohol use: No     Alcohol/week: 0.0 oz   • Drug use: No   • Sexual activity: Never     Partners: Female     Comment: retired    Lifestyle   • Physical activity     Days per week: Not on file     Minutes per session: Not on file   • Stress: Not on file   Relationships   • Social connections     Talks on phone: Not on file     Gets together: Not on file     Attends Latter day service: Not on file     Active member of club or organization: Not on file     Attends meetings of clubs or organizations: Not on file     Relationship status: Not on file   • Intimate partner violence     Fear of current or ex partner: Not on file     Emotionally abused: Not on file     Physically abused: Not on file     Forced sexual activity: Not on file   Other Topics Concern   • Not on file   Social History Narrative    See H&P    Lives with wife       ROS: A 10 system review of systems was negative outside what was listed in the HPI    /78   Pulse 95   Temp 37 °C (98.6 °F) (Temporal)   Resp 17   Ht 1.854 m (6' 1\")   Wt 92.8 kg " (204 lb 9.4 oz)   SpO2 92%     Physical Exam:  General: AAOx3, NAD  HEENT: normocephalic, atraumatic  Psych: Normal mood and affect  Neck: supple, no pain to motion  Chest/Pulmonary: breathing unlabored, no audible wheezing  Cardio: regular heart rate and rhythm  Neuro: sensation grossly intact to BUE and BLE, moving all four extremities  Skin: Healing wounds to the right knee and leg with Steri-Strips in place.  There is no active drainage.  MSK: There is erythema to the right leg as well as some pitting edema.  Palpation of the right leg as well as the left lower extremity bilateral extremities is nontender.  There is no areas of fluctuance or abscesses.    Imaging and labs: Initial aspiration of the right knee had cell count of 1700 and cultures positive for Pseudomonas repeat aspiration and cell counts are pending  Recent Labs     12/16/20  1334 12/17/20  0109   WBC 13.4* 12.0*   RBC 4.08* 3.50*   HEMOGLOBIN 13.1* 11.1*   HEMATOCRIT 42.1 35.1*   .2* 100.3*   MCH 32.1 31.7   RDW 57.6* 54.9*   PLATELETCT 169 168   MPV 9.7 10.0   NEUTSPOLYS 85.90*  --    LYMPHOCYTES 4.60*  --    MONOCYTES 8.40  --    EOSINOPHILS 0.10  --    BASOPHILS 0.60  --          Assessment: Healing right knee incisions and right knee effusion    We discussed the diagnosis and findings with the patient at length.  We reviewed possible non operative and operative interventions and the risks and benefits of these.  Currently I would expect his Pseudomonas to be a contaminant as a white cell count of 1700 his orders of magnitude below would be expected for a septic joint.  He also does not have exam consistent with a septic joint.  He had a chance to ask questions and all of these were answered to his satisfaction.        Plan:  No operative plans at this time  Await repeat aspirate of right knee findings  Would currently consider the Pseudomonas a contaminant  Weightbearing and activity to the right knee as tolerated or per   Lucie     yes
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
## 2024-10-21 NOTE — H&P ADULT - NSCORESITESY/N_GEN_A_CORE_RD
From: Lenka Zavaleta  To: Agustin Duke  Sent: 11/22/2021 1:13 PM CST  Subject: Severe nausea     Can’t keep anything down. When I try to eat something light or even just drinking liquids . Can I get something for the nausea to help me through the day .     Ty  
Yes
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
## 2024-10-21 NOTE — ED BEHAVIORAL HEALTH PROGRESS NOTE - SUMMARY
Post-Anesthesia Evaluation and Assessment    Patient: Jeffery Crowe MRN: [id number]  SSN: [id number]    YOB: 1944  Age: 75 y.o. Sex: male       Cardiovascular Function/Vital Signs  Visit Vitals    /74    Pulse 74    Temp 37 °C (98.6 °F)    Resp 30    Ht 5' 10\" (1.778 m)    Wt 65.8 kg (145 lb)    SpO2 98%    BMI 20.81 kg/m2       Patient is status post total IV anesthesia anesthesia for Procedure(s):  COLONOSCOPY / BMI=21. Nausea/Vomiting: None    Postoperative hydration reviewed and adequate. Pain:  Pain Scale 1: Numeric (0 - 10) (02/12/18 1042)  Pain Intensity 1: 0 (02/12/18 1042)   Managed    Neurological Status: At baseline    Mental Status and Level of Consciousness: Arousable    Pulmonary Status:   O2 Device: Room air (02/12/18 1042)   Adequate oxygenation and airway patent    Complications related to anesthesia: None    Post-anesthesia assessment completed.  No concerns    Signed By: Alli Velazquez MD     February 12, 2018
see prior assessment

## 2024-10-21 NOTE — H&P ADULT - ASSESSMENT
19y Male, PMH of autism, ADHD, history of episodes of agitation and aggression brought in by EMS from home for agitation.    #Agitation with hx of ADHD, autism  - Will place in observation  - Telepsych consult appreciated  - Will consult psychiatry Akash Morrow  - Will place SW consult  - SW is working with SW from [facility] to find crisis housing  - Will c/w home medications  - Noted CT brain normal, labs all WNL  - Will check TSH/FT4 in AM    DVT PPX   AM Labs  Full code  DISP: Pending crisis housing  19y Male, PMH of autism, ADHD, history of episodes of agitation and aggression brought in by EMS from home for agitation.    #Agitation with hx of ADHD, autism  - Will place in observation  - Telepsych consult appreciated  - Will consult psychiatry Akash Morrow  - Will place SW consult  - JUDI is working with SW from [facility] to find crisis housing  - I called [facility] [address] to obtain med rec; he does not know his medications; he is one lithium ER 300mg daily, pristiq ER 100mg daily, seroquel 300mg BID; we do not have pristiq here in the hospital; will see what psychiatry recommends  - Noted CT brain normal, labs all WNL  - Will check TSH/FT4 in AM    DVT PPX   AM Labs  Full code  DISP: Pending crisis housing  19y Male, PMH of autism, ADHD, history of episodes of agitation and aggression brought in by EMS from home for agitation.    #Agitation with hx of ADHD, autism  - Will place in observation  - Telepsych consult appreciated  - Will consult psychiatry Akash Morrow  - C/w constant observation until psychiatry recommends otherwise  - Will place SW consult  - SW is working with SW from [employer] to find crisis housing  - I called [facility] [address] to obtain med rec; he does not know his medications; he is one lithium ER 300mg daily, pristiq ER 100mg daily, seroquel 300mg BID; we do not have pristiq here in the hospital; will see what psychiatry recommends  - Noted CT brain normal, labs all WNL  - Will check TSH/FT4 in AM    DVT PPX   AM Labs  Full code  DISP: Pending crisis housing

## 2024-10-21 NOTE — ED BEHAVIORAL HEALTH PROGRESS NOTE - DETAILS
pt able to participate slightly. Talks about taking a walk outside or calling therapist as coping strategies. d/w parents
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
## 2024-10-21 NOTE — H&P ADULT - NSHPPHYSICALEXAM_GEN_ALL_CORE
Vital Signs Last 24 Hrs  T(F): 98.1 (21 Oct 2024 09:35), Max: 99.4 (20 Oct 2024 19:10)  HR: 100 (21 Oct 2024 09:35) (90 - 140)  BP: 132/87 (21 Oct 2024 09:35) (120/89 - 134/70)  RR: 18 (21 Oct 2024 03:39) (18 - 20)  SpO2: 98% (21 Oct 2024 03:39) (96% - 98%)    PHYSICAL EXAM:  GENERAL: NAD, well-groomed, well-developed  HEAD:  Atraumatic, Normocephalic  EYES: EOMI, conjunctiva and sclera clear  ENMT: Moist mucous membranes, Good dentition, no thrush  NECK: Supple, No JVD  CHEST/LUNG: Clear to auscultation bilaterally, good air entry, non-labored breathing  HEART: RRR; S1/S2, No murmur  ABDOMEN: Soft, Nontender, Nondistended; Bowel sounds present  VASCULAR: Normal pulses, Normal capillary refill  EXTREMITIES: No calf tenderness, No cyanosis, No edema  LYMPH: Normal; No lymphadenopathy noted  SKIN: Warm, Intact  PSYCH: Normal mood, Normal affect  NERVOUS SYSTEM:  A/O x3, Good concentration; CN 2-12 intact, No focal deficits

## 2024-10-21 NOTE — H&P ADULT - NSHPLABSRESULTS_GEN_ALL_CORE
16.8   9.62  )-----------( 216      ( 20 Oct 2024 15:10 )             47.8       10-20    142  |  104  |  12  ----------------------------<  113  4.4   |  27  |  1.00    Ca    9.4      20 Oct 2024 15:10    TPro  7.4  /  Alb  4.7  /  TBili  0.8  /  DBili  x   /  AST  25  /  ALT  25  /  AlkPhos  115  10-20    Urinalysis Basic - ( 20 Oct 2024 15:10 )    Color: x / Appearance: x / SG: x / pH: x  Gluc: 113 mg/dL / Ketone: x  / Bili: x / Urobili: x   Blood: x / Protein: x / Nitrite: x   Leuk Esterase: x / RBC: x / WBC x   Sq Epi: x / Non Sq Epi: x / Bacteria: x    COVID-19 PCR: NotDetec (10-20-24 @ 15:10)  COVID-19 PCR: NotDetec (20 Oct 2024 15:10)    12 Lead ECG:   Ventricular Rate 120 BPM    Atrial Rate 120 BPM    P-R Interval 130 ms    QRS Duration 88 ms    Q-T Interval 278 ms    QTC Calculation(Bazett) 392 ms    P Axis 51 degrees    R Axis 56 degrees    T Axis 48 degrees    Diagnosis Line Sinus tachycardia  Otherwise normal ECG  No previous ECGs available  Confirmed by TOMÁS STOREY, ANSHUL CLEMENTE ([id number]) on 10/21/2024 8:33:58 AM (10-20-24 @ 14:56)    RADIOLOGY  CT Head No Cont (10.20.24 @ 15:35) >  Impression:  Sinus mucosal thickening otherwise unremarkable noncontrast head CT.    Consultant(s) Notes Reveiwed [ x] Yes   Telepsych  Care Discussed with [x ] Consultants  [ ]x Patient  [ ] Family  [ ] /   [ ] Other; RN

## 2024-10-21 NOTE — PATIENT PROFILE ADULT - FALL HARM RISK - HARM RISK INTERVENTIONS
constant observation/Communicate Risk of Fall with Harm to all staff/Monitor gait and stability/Reinforce activity limits and safety measures with patient and family/Tailored Fall Risk Interventions/Visual Cue: Yellow wristband and red socks/Bed in lowest position, wheels locked, appropriate side rails in place/Call bell, personal items and telephone in reach/Instruct patient to call for assistance before getting out of bed or chair/Non-slip footwear when patient is out of bed/[place] to call system/Physically safe environment - no spills, clutter or unnecessary equipment/Purposeful Proactive Rounding/Room/bathroom lighting operational, light cord in reach
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
## 2024-10-21 NOTE — ED BEHAVIORAL HEALTH PROGRESS NOTE - DETAILS:
Patient has been in good behavioral control. On interview today he is calm, cooperative, not in acute emotional distress. He reports fair mood, denies any specific complaints. He is very concrete on conversation, sometimes repeats certain words/phrases. He does demonstrate understanding of his behavior and is still very apologetic. He is aware of his physical outburst but has difficulty articulating the triggering factors or describing his emotions. Currently does not have any violent thoughts and engages slightly in some safety planning, able to recognize that he can call his therapist in the future if he were to become upset again. Patient does say he worries about his own impulsivity and states the home environment is triggering because he wants to be more independent, and at home he cannot always do things his way. Patient expresses clearly his desire to be ultimately placed in a group home/independent housing.

## 2024-10-22 DIAGNOSIS — F63.81 INTERMITTENT EXPLOSIVE DISORDER: ICD-10-CM

## 2024-10-22 LAB
ANION GAP SERPL CALC-SCNC: 11 MMOL/L — SIGNIFICANT CHANGE UP (ref 5–17)
BUN SERPL-MCNC: 11 MG/DL — SIGNIFICANT CHANGE UP (ref 7–23)
CALCIUM SERPL-MCNC: 9.4 MG/DL — SIGNIFICANT CHANGE UP (ref 8.4–10.5)
CHLORIDE SERPL-SCNC: 105 MMOL/L — SIGNIFICANT CHANGE UP (ref 96–108)
CO2 SERPL-SCNC: 27 MMOL/L — SIGNIFICANT CHANGE UP (ref 22–31)
CREAT SERPL-MCNC: 1 MG/DL — SIGNIFICANT CHANGE UP (ref 0.5–1.3)
EGFR: 110 ML/MIN/1.73M2 — SIGNIFICANT CHANGE UP
GLUCOSE SERPL-MCNC: 138 MG/DL — HIGH (ref 70–99)
LITHIUM SERPL-MCNC: <0.2 MMOL/L — LOW (ref 0.6–1.2)
MAGNESIUM SERPL-MCNC: 2.2 MG/DL — SIGNIFICANT CHANGE UP (ref 1.6–2.6)
POTASSIUM SERPL-MCNC: 3.6 MMOL/L — SIGNIFICANT CHANGE UP (ref 3.5–5.3)
POTASSIUM SERPL-SCNC: 3.6 MMOL/L — SIGNIFICANT CHANGE UP (ref 3.5–5.3)
SODIUM SERPL-SCNC: 143 MMOL/L — SIGNIFICANT CHANGE UP (ref 135–145)
T4 FREE SERPL-MCNC: 0.9 NG/DL — SIGNIFICANT CHANGE UP (ref 0.9–1.8)
TSH SERPL-MCNC: 0.67 UIU/ML — SIGNIFICANT CHANGE UP (ref 0.36–3.74)

## 2024-10-22 PROCEDURE — 99232 SBSQ HOSP IP/OBS MODERATE 35: CPT

## 2024-10-22 RX ORDER — DESVENLAFAXINE SUCCINATE 50 MG/1
50 TABLET, EXTENDED RELEASE ORAL DAILY
Refills: 0 | Status: DISCONTINUED | OUTPATIENT
Start: 2024-10-23 | End: 2024-11-26

## 2024-10-22 RX ORDER — TUBERCULIN,PURIF.PROT.DERIV. 250/0.1ML
5 VIAL (ML) INTRADERMAL ONCE
Refills: 0 | Status: COMPLETED | OUTPATIENT
Start: 2024-10-22 | End: 2024-10-22

## 2024-10-22 RX ADMIN — DESVENLAFAXINE SUCCINATE 100 MILLIGRAM(S): 50 TABLET, EXTENDED RELEASE ORAL at 11:24

## 2024-10-22 RX ADMIN — Medication 300 MILLIGRAM(S): at 21:24

## 2024-10-22 RX ADMIN — Medication 5 UNIT(S): at 15:29

## 2024-10-22 RX ADMIN — Medication 300 MILLIGRAM(S): at 05:30

## 2024-10-22 RX ADMIN — LITHIUM CARBONATE 600 MILLIGRAM(S): 300 CAPSULE ORAL at 11:24

## 2024-10-22 NOTE — BH CONSULTATION LIAISON PROGRESS NOTE - NSBHCONSULTRECOMMENDOTHER_PSY_A_CORE FT
Decrease Pristiq to 50 mg  Start Propranolol 10 mg bid   continue Lithium for now  Attempt to get collateral from prescribing physician regarding past medication trials.   no need to repeat Lithium level.   Continue Quetiapine.  Decrease Pristiq to 50 mg  Start Propranolol 10 mg bid   continue Lithium for now  Attempt to get collateral from prescribing physician regarding past medication trials.   no need to repeat Lithium level.   Continue Quetiapine.   Contact for care coordinator working with [facility] for filing paperwork for respite   Ms. Mesa Reji [phone].

## 2024-10-22 NOTE — PROGRESS NOTE ADULT - SUBJECTIVE AND OBJECTIVE BOX
CC: Patient is a 19y old  Male who presents with a chief complaint of Placement (21 Oct 2024 12:13)      Interval History:  Patient seen and examined at bedside.  No overnight events  No complaints this morning    ALLERGIES:  No Known Allergies    MEDICATIONS  (STANDING):  lithium SR (LITHOBID) 600 milliGRAM(s) Oral daily  QUEtiapine 300 milliGRAM(s) Oral two times a day    MEDICATIONS  (PRN):  acetaminophen     Tablet .. 650 milliGRAM(s) Oral every 6 hours PRN Temp greater or equal to 38C (100.4F), Mild Pain (1 - 3)  aluminum hydroxide/magnesium hydroxide/simethicone Suspension 30 milliLiter(s) Oral every 4 hours PRN Dyspepsia  melatonin 3 milliGRAM(s) Oral at bedtime PRN Insomnia  ondansetron Injectable 4 milliGRAM(s) IV Push every 8 hours PRN Nausea and/or Vomiting  sodium chloride 0.65% Nasal 1 Spray(s) Both Nostrils daily PRN Nasal Congestion    Vital Signs Last 24 Hrs  T(F): 98.7 (22 Oct 2024 14:37), Max: 99 (21 Oct 2024 17:27)  HR: 98 (22 Oct 2024 14:37) (87 - 99)  BP: 117/76 (22 Oct 2024 14:37) (112/78 - 124/75)  RR: 18 (22 Oct 2024 14:37) (18 - 18)  SpO2: 94% (22 Oct 2024 14:37) (93% - 94%)  I&O's Summary    21 Oct 2024 07:01  -  22 Oct 2024 07:00  --------------------------------------------------------  IN: 0 mL / OUT: 1 mL / NET: -1 mL    22 Oct 2024 07:01  -  22 Oct 2024 15:59  --------------------------------------------------------  IN: 600 mL / OUT: 0 mL / NET: 600 mL      BMI (kg/m2): 29.4 (10-20-24 @ 13:51)    PHYSICAL EXAM:  GENERAL: NAD  NERVOUS SYSTEM:  CN II - XII intact; Sensation intact; follows commands  CHEST/LUNG: Clear to percussion bilaterally; No rales, rhonchi, wheezing, or rubs; normal respiratory effort, no intercostal retractions  HEART: Regular rate and rhythm; No murmurs, rubs, or gallops; No pitting edema  ABDOMEN: Soft, Nontender, Nondistended; Bowel sounds present; No HSM or masses  MUSCULOSKELETAL/EXTREMITIES:  2+ Peripheral Pulses, No clubbing or digital cyanosis; FROM of extremeties (pain, crepitation or contracture)  PSYCH: Appropriate affect, Alert & Oriented x 3,   LABS:                        16.8   9.62  )-----------( 216      ( 20 Oct 2024 15:10 )             47.8       10-22    143  |  105  |  11  ----------------------------<  138  3.6   |  27  |  1.00    Ca    9.4      22 Oct 2024 07:24  Mg     2.2     10-22    TPro  7.4  /  Alb  4.7  /  TBili  0.8  /  DBili  x   /  AST  25  /  ALT  25  /  AlkPhos  115  10-20                  TSH 0.667   TSH with FT4 reflex --  Total T3 --                  Urinalysis Basic - ( 22 Oct 2024 07:24 )    Color: x / Appearance: x / SG: x / pH: x  Gluc: 138 mg/dL / Ketone: x  / Bili: x / Urobili: x   Blood: x / Protein: x / Nitrite: x   Leuk Esterase: x / RBC: x / WBC x   Sq Epi: x / Non Sq Epi: x / Bacteria: x        COVID-19 PCR: NotDetec (10-20-24 @ 15:10)      Care Discussed with Consultants/Other Providers: Yes. Discussed with Psychiatry

## 2024-10-22 NOTE — PROGRESS NOTE ADULT - ASSESSMENT
19y Male, PMH of autism, ADHD, history of episodes of agitation and aggression brought in by EMS from home for agitation.    #Agitation with hx of ADHD, autism  -psychiatry consult appreciated  - C/w constant observation  for now  - medications adjusted, on Lithium 600 mg daily, Pristiq 50 mg daily and Seroquel  - arrangements are being made for discharge  - followed by Social Work  -PPD placed    DVT PPX    Full code  DISP: Pending crisis housing

## 2024-10-22 NOTE — BH CONSULTATION LIAISON PROGRESS NOTE - NSBHCONSULTPRIMARYDISCUSSYES_PSY_A_CORE FT
case discussed in team rounds.   Awaits PPD for placement.   Continue coordination with [facility]  and [facility] Social work staff for respite housing.
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
## 2024-10-23 LAB
GAMMA INTERFERON BACKGROUND BLD IA-ACNC: 0.04 IU/ML — SIGNIFICANT CHANGE UP
M TB IFN-G BLD-IMP: NEGATIVE — SIGNIFICANT CHANGE UP
M TB IFN-G CD4+ BCKGRND COR BLD-ACNC: 0 IU/ML — SIGNIFICANT CHANGE UP
M TB IFN-G CD4+CD8+ BCKGRND COR BLD-ACNC: 0 IU/ML — SIGNIFICANT CHANGE UP
QUANT TB PLUS MITOGEN MINUS NIL: >10 IU/ML — SIGNIFICANT CHANGE UP

## 2024-10-23 PROCEDURE — 99232 SBSQ HOSP IP/OBS MODERATE 35: CPT

## 2024-10-23 RX ADMIN — DESVENLAFAXINE SUCCINATE 50 MILLIGRAM(S): 50 TABLET, EXTENDED RELEASE ORAL at 11:54

## 2024-10-23 RX ADMIN — ONDANSETRON HYDROCHLORIDE 4 MILLIGRAM(S): 4 TABLET, FILM COATED ORAL at 15:17

## 2024-10-23 RX ADMIN — LITHIUM CARBONATE 600 MILLIGRAM(S): 300 CAPSULE ORAL at 11:53

## 2024-10-23 RX ADMIN — Medication 300 MILLIGRAM(S): at 17:32

## 2024-10-23 RX ADMIN — Medication 300 MILLIGRAM(S): at 06:53

## 2024-10-23 NOTE — PROGRESS NOTE ADULT - SUBJECTIVE AND OBJECTIVE BOX
CC: Patient is a 19y old  Male who presents with a chief complaint of Placement (22 Oct 2024 15:59)      Interval History:  Patient seen and examined at bedside.  No overnight events  No complaints this morning  Denies CP, SOB, abd pain, N/V/D, dizziness    ALLERGIES:  No Known Allergies    MEDICATIONS  (STANDING):  desvenlafaxine ER 50 milliGRAM(s) Oral daily  lithium SR (LITHOBID) 600 milliGRAM(s) Oral daily  QUEtiapine 300 milliGRAM(s) Oral two times a day    MEDICATIONS  (PRN):  acetaminophen     Tablet .. 650 milliGRAM(s) Oral every 6 hours PRN Temp greater or equal to 38C (100.4F), Mild Pain (1 - 3)  aluminum hydroxide/magnesium hydroxide/simethicone Suspension 30 milliLiter(s) Oral every 4 hours PRN Dyspepsia  melatonin 3 milliGRAM(s) Oral at bedtime PRN Insomnia  ondansetron Injectable 4 milliGRAM(s) IV Push every 8 hours PRN Nausea and/or Vomiting  sodium chloride 0.65% Nasal 1 Spray(s) Both Nostrils daily PRN Nasal Congestion    Vital Signs Last 24 Hrs  T(F): 98.5 (23 Oct 2024 12:21), Max: 98.7 (22 Oct 2024 19:30)  HR: 94 (23 Oct 2024 12:21) (76 - 94)  BP: 130/81 (23 Oct 2024 12:21) (119/77 - 130/81)  RR: 17 (23 Oct 2024 12:21) (16 - 18)  SpO2: 96% (23 Oct 2024 12:21) (94% - 97%)  I&O's Summary    22 Oct 2024 07:01  -  23 Oct 2024 07:00  --------------------------------------------------------  IN: 600 mL / OUT: 0 mL / NET: 600 mL    23 Oct 2024 07:01  -  23 Oct 2024 14:58  --------------------------------------------------------  IN: 620 mL / OUT: 0 mL / NET: 620 mL      BMI (kg/m2): 29.4 (10-20-24 @ 13:51)    PHYSICAL EXAM:  GENERAL: NAD  NERVOUS SYSTEM:  CN II - XII intact; Sensation intact; follows commands  CHEST/LUNG: Clear to percussion bilaterally; No rales, rhonchi, wheezing, or rubs; normal respiratory effort, no intercostal retractions  HEART: Regular rate and rhythm; No murmurs, rubs, or gallops; No pitting edema  ABDOMEN: Soft, Nontender, Nondistended; Bowel sounds present; No HSM or masses  MUSCULOSKELETAL/EXTREMITIES:  2+ Peripheral Pulses, No clubbing or digital cyanosis; FROM of extremeties (pain, crepitation or contracture)  PSYCH: Appropriate affect, Alert & Oriented x 3, Good Memory; Good insight    LABS:                        16.8   9.62  )-----------( 216      ( 20 Oct 2024 15:10 )             47.8       10-22    143  |  105  |  11  ----------------------------<  138  3.6   |  27  |  1.00    Ca    9.4      22 Oct 2024 07:24  Mg     2.2     10-22    TPro  7.4  /  Alb  4.7  /  TBili  0.8  /  DBili  x   /  AST  25  /  ALT  25  /  AlkPhos  115  10-20                  TSH 0.667   TSH with FT4 reflex --  Total T3 --                  Urinalysis Basic - ( 22 Oct 2024 07:24 )    Color: x / Appearance: x / SG: x / pH: x  Gluc: 138 mg/dL / Ketone: x  / Bili: x / Urobili: x   Blood: x / Protein: x / Nitrite: x   Leuk Esterase: x / RBC: x / WBC x   Sq Epi: x / Non Sq Epi: x / Bacteria: x        COVID-19 PCR: NotDetec (10-20-24 @ 15:10)      Care Discussed with Consultants/Other Providers: Yes
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
## 2024-10-24 LAB — DRUG SCREEN, SERUM: SIGNIFICANT CHANGE UP

## 2024-10-24 PROCEDURE — 99232 SBSQ HOSP IP/OBS MODERATE 35: CPT

## 2024-10-24 PROCEDURE — 99233 SBSQ HOSP IP/OBS HIGH 50: CPT

## 2024-10-24 RX ADMIN — LITHIUM CARBONATE 600 MILLIGRAM(S): 300 CAPSULE ORAL at 11:28

## 2024-10-24 RX ADMIN — DESVENLAFAXINE SUCCINATE 50 MILLIGRAM(S): 50 TABLET, EXTENDED RELEASE ORAL at 11:28

## 2024-10-24 RX ADMIN — Medication 300 MILLIGRAM(S): at 17:29

## 2024-10-24 RX ADMIN — Medication 5 UNIT(S): at 11:39

## 2024-10-24 RX ADMIN — Medication 300 MILLIGRAM(S): at 08:28

## 2024-10-24 NOTE — PROGRESS NOTE ADULT - SUBJECTIVE AND OBJECTIVE BOX
Patient is a 19y old  Male who presents with a chief complaint of Placement (23 Oct 2024 14:58)    Patient seen and examined at bedside.  S: w/o new complaints.     ALLERGIES:  No Known Allergies    MEDICATIONS:  acetaminophen     Tablet .. 650 milliGRAM(s) Oral every 6 hours PRN  aluminum hydroxide/magnesium hydroxide/simethicone Suspension 30 milliLiter(s) Oral every 4 hours PRN  desvenlafaxine ER 50 milliGRAM(s) Oral daily  lithium SR (LITHOBID) 600 milliGRAM(s) Oral daily  melatonin 3 milliGRAM(s) Oral at bedtime PRN  ondansetron Injectable 4 milliGRAM(s) IV Push every 8 hours PRN  QUEtiapine 300 milliGRAM(s) Oral two times a day  sodium chloride 0.65% Nasal 1 Spray(s) Both Nostrils daily PRN    Vital Signs Last 24 Hrs  T(F): 97.9 (24 Oct 2024 06:00), Max: 98.1 (23 Oct 2024 19:08)  HR: 69 (24 Oct 2024 06:00) (69 - 93)  BP: 109/68 (24 Oct 2024 06:00) (109/68 - 116/72)  RR: 16 (24 Oct 2024 06:00) (16 - 17)  SpO2: 95% (24 Oct 2024 06:00) (95% - 96%)  I&O's Summary    23 Oct 2024 07:01  -  24 Oct 2024 07:00  --------------------------------------------------------  IN: 620 mL / OUT: 0 mL / NET: 620 mL        PHYSICAL EXAM:  General: NAD, A/O x 3  ENT: MMM  Lungs: Clear to auscultation bilaterally   Cardio: RR, S1/S2, No murmurs  Abdomen: Soft, NT/ND, Normal active Bowel Sounds   Extremities: No cyanosis, No edema      LABS:    10-22    143  |  105  |  11  ----------------------------<  138  3.6   |  27  |  1.00    Ca    9.4      22 Oct 2024 07:24  Mg     2.2     10-22      TSH 0.667   TSH with FT4 reflex --  Total T3 --      Urinalysis Basic - ( 22 Oct 2024 07:24 )  Color: x / Appearance: x / SG: x / pH: x  Gluc: 138 mg/dL / Ketone: x  / Bili: x / Urobili: x   Blood: x / Protein: x / Nitrite: x   Leuk Esterase: x / RBC: x / WBC x   Sq Epi: x / Non Sq Epi: x / Bacteria: x      COVID-19 PCR: NotDetec (10-20-24 @ 15:10)      Care Discussed with Consultants/Other Providers:   VIKAS Isaac discussed case and plan with Dr. Juan.

## 2024-10-24 NOTE — BH CONSULTATION LIAISON PROGRESS NOTE - NSBHCONSULTRECOMMENDOTHER_PSY_A_CORE FT
Continue Lithium 600 mg total dose. ( despite low level would defer increasing his dose).   Continue Pristiq 50 mg  Continue Quetiapine 300 mg bid.     Consider long term Propranolol.
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
## 2024-10-25 PROCEDURE — 99233 SBSQ HOSP IP/OBS HIGH 50: CPT

## 2024-10-25 RX ADMIN — Medication 300 MILLIGRAM(S): at 17:47

## 2024-10-25 RX ADMIN — LITHIUM CARBONATE 600 MILLIGRAM(S): 300 CAPSULE ORAL at 13:24

## 2024-10-25 RX ADMIN — Medication 300 MILLIGRAM(S): at 07:47

## 2024-10-25 RX ADMIN — DESVENLAFAXINE SUCCINATE 50 MILLIGRAM(S): 50 TABLET, EXTENDED RELEASE ORAL at 13:24

## 2024-10-25 NOTE — DIETITIAN INITIAL EVALUATION ADULT - OTHER INFO
Patient noted with fairly good appetite, consuming % of meals at this time per nursing flowsheets. Denies any chewing/swallowing difficulties. Food preferences obtained and noted on patients file with nutrition office. Electrolytes stable at this time.

## 2024-10-25 NOTE — PROGRESS NOTE ADULT - SUBJECTIVE AND OBJECTIVE BOX
Patient is a 19y old  Male who presents with a chief complaint of Other symptom or sign involving appearance or behavior     (25 Oct 2024 11:35)    Patient seen and examined at bedside.  S: w/o new complaints.     ALLERGIES:  No Known Allergies    MEDICATIONS:  acetaminophen     Tablet .. 650 milliGRAM(s) Oral every 6 hours PRN  aluminum hydroxide/magnesium hydroxide/simethicone Suspension 30 milliLiter(s) Oral every 4 hours PRN  desvenlafaxine ER 50 milliGRAM(s) Oral daily  lithium SR (LITHOBID) 600 milliGRAM(s) Oral daily  melatonin 3 milliGRAM(s) Oral at bedtime PRN  ondansetron Injectable 4 milliGRAM(s) IV Push every 8 hours PRN  QUEtiapine 300 milliGRAM(s) Oral two times a day  sodium chloride 0.65% Nasal 1 Spray(s) Both Nostrils daily PRN    Vital Signs Last 24 Hrs  T(F): 98.1 (25 Oct 2024 05:48), Max: 98.6 (24 Oct 2024 19:33)  HR: 71 (25 Oct 2024 05:48) (71 - 101)  BP: 113/72 (25 Oct 2024 05:48) (113/72 - 136/86)  RR: 17 (25 Oct 2024 05:48) (16 - 17)  SpO2: 95% (25 Oct 2024 05:48) (93% - 95%)  I&O's Summary      PHYSICAL EXAM:  General: NAD, A/O x 3  ENT: MMM  Neck:   Lungs: Clear to auscultation bilaterally (Anteriorly)   Cardio: RR, S1/S2, No murmurs  Abdomen: Soft, NT/ND, Normal active Bowel Sounds   Extremities: No cyanosis, No edema    LABS:  n/a     COVID-19 PCR: NotDetec (10-20-24 @ 15:10)    Care Discussed with Consultants/Other Providers:   VIKAS Isaac discussed case and plan with Dr. Juan

## 2024-10-25 NOTE — DIETITIAN INITIAL EVALUATION ADULT - ORAL INTAKE PTA/DIET HISTORY
Patient endorses a good appetite prior to admission. No known food allergies/intolerances. Patient does not follow any therapeutic meal patterns.

## 2024-10-25 NOTE — DIETITIAN INITIAL EVALUATION ADULT - PERTINENT MEDS FT
MEDICATIONS  (STANDING):  desvenlafaxine ER 50 milliGRAM(s) Oral daily  lithium SR (LITHOBID) 600 milliGRAM(s) Oral daily  QUEtiapine 300 milliGRAM(s) Oral two times a day    MEDICATIONS  (PRN):  acetaminophen     Tablet .. 650 milliGRAM(s) Oral every 6 hours PRN Temp greater or equal to 38C (100.4F), Mild Pain (1 - 3)  aluminum hydroxide/magnesium hydroxide/simethicone Suspension 30 milliLiter(s) Oral every 4 hours PRN Dyspepsia  melatonin 3 milliGRAM(s) Oral at bedtime PRN Insomnia  ondansetron Injectable 4 milliGRAM(s) IV Push every 8 hours PRN Nausea and/or Vomiting  sodium chloride 0.65% Nasal 1 Spray(s) Both Nostrils daily PRN Nasal Congestion
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
## 2024-10-26 PROCEDURE — 99232 SBSQ HOSP IP/OBS MODERATE 35: CPT

## 2024-10-26 RX ADMIN — DESVENLAFAXINE SUCCINATE 50 MILLIGRAM(S): 50 TABLET, EXTENDED RELEASE ORAL at 11:29

## 2024-10-26 RX ADMIN — LITHIUM CARBONATE 600 MILLIGRAM(S): 300 CAPSULE ORAL at 11:28

## 2024-10-26 RX ADMIN — Medication 300 MILLIGRAM(S): at 05:11

## 2024-10-26 RX ADMIN — Medication 300 MILLIGRAM(S): at 17:07
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
## 2024-10-26 NOTE — PROGRESS NOTE ADULT - SUBJECTIVE AND OBJECTIVE BOX
PROGRESS NOTE:     Patient is a 19y old  Male who presents with a chief complaint of Placement (25 Oct 2024 13:02)      SUBJECTIVE / OVERNIGHT EVENTS: pt was seen adn evaluated today  no complains resting comfortably in bed     ADDITIONAL REVIEW OF SYSTEMS: as per HPI    MEDICATIONS  (STANDING):  desvenlafaxine ER 50 milliGRAM(s) Oral daily  lithium SR (LITHOBID) 600 milliGRAM(s) Oral daily  QUEtiapine 300 milliGRAM(s) Oral two times a day    MEDICATIONS  (PRN):  acetaminophen     Tablet .. 650 milliGRAM(s) Oral every 6 hours PRN Temp greater or equal to 38C (100.4F), Mild Pain (1 - 3)  aluminum hydroxide/magnesium hydroxide/simethicone Suspension 30 milliLiter(s) Oral every 4 hours PRN Dyspepsia  melatonin 3 milliGRAM(s) Oral at bedtime PRN Insomnia  ondansetron Injectable 4 milliGRAM(s) IV Push every 8 hours PRN Nausea and/or Vomiting  sodium chloride 0.65% Nasal 1 Spray(s) Both Nostrils daily PRN Nasal Congestion      CAPILLARY BLOOD GLUCOSE        I&O's Summary      PHYSICAL EXAM:  Vital Signs Last 24 Hrs  T(C): 36.5 (26 Oct 2024 05:04), Max: 36.6 (25 Oct 2024 19:33)  T(F): 97.7 (26 Oct 2024 05:04), Max: 97.9 (25 Oct 2024 19:33)  HR: 77 (26 Oct 2024 05:04) (77 - 86)  BP: 121/73 (26 Oct 2024 05:04) (121/73 - 132/76)  BP(mean): --  RR: 18 (26 Oct 2024 05:04) (18 - 18)  SpO2: 96% (26 Oct 2024 05:04) (94% - 96%)    Parameters below as of 26 Oct 2024 05:04  Patient On (Oxygen Delivery Method): room air          HYSICAL EXAM:  General: NAD, A/O x 3  ENT: MMM  Neck:   Lungs: Clear to auscultation bilaterally (Anteriorly)   Cardio: RR, S1/S2, No murmurs  Abdomen: Soft, NT/ND, Normal active Bowel Sounds   Extremities: No cyanosis, No edema    LABS:  n/a     COVID-19 PCR: NotDetec (10-20-24 @ 15:1          RADIOLOGY & ADDITIONAL TESTS:  Results Reviewed: yes   Imaging Personally Reviewed: yes  Electrocardiogram Personally Reviewed:  yes    COORDINATION OF CARE:  Care Discussed with Consultants/Other Providers [Y/N]:y  Prior or Outpatient Records Reviewed [Y/N]:y

## 2024-10-27 PROCEDURE — 99232 SBSQ HOSP IP/OBS MODERATE 35: CPT

## 2024-10-27 RX ADMIN — LITHIUM CARBONATE 600 MILLIGRAM(S): 300 CAPSULE ORAL at 12:10

## 2024-10-27 RX ADMIN — DESVENLAFAXINE SUCCINATE 50 MILLIGRAM(S): 50 TABLET, EXTENDED RELEASE ORAL at 12:10

## 2024-10-27 RX ADMIN — Medication 300 MILLIGRAM(S): at 17:57

## 2024-10-27 RX ADMIN — Medication 300 MILLIGRAM(S): at 05:37

## 2024-10-27 NOTE — PROGRESS NOTE ADULT - SUBJECTIVE AND OBJECTIVE BOX
PROGRESS NOTE:     Patient is a 19y old  Male who presents with a chief complaint of Placement (26 Oct 2024 09:14)      SUBJECTIVE / OVERNIGHT EVENTS: pt was seen and evaluated this am   no complains offered     ADDITIONAL REVIEW OF SYSTEMS:    MEDICATIONS  (STANDING):  desvenlafaxine ER 50 milliGRAM(s) Oral daily  lithium SR (LITHOBID) 600 milliGRAM(s) Oral daily  QUEtiapine 300 milliGRAM(s) Oral two times a day    MEDICATIONS  (PRN):  acetaminophen     Tablet .. 650 milliGRAM(s) Oral every 6 hours PRN Temp greater or equal to 38C (100.4F), Mild Pain (1 - 3)  aluminum hydroxide/magnesium hydroxide/simethicone Suspension 30 milliLiter(s) Oral every 4 hours PRN Dyspepsia  melatonin 3 milliGRAM(s) Oral at bedtime PRN Insomnia  ondansetron Injectable 4 milliGRAM(s) IV Push every 8 hours PRN Nausea and/or Vomiting  sodium chloride 0.65% Nasal 1 Spray(s) Both Nostrils daily PRN Nasal Congestion      CAPILLARY BLOOD GLUCOSE        I&O's Summary      PHYSICAL EXAM:  Vital Signs Last 24 Hrs  T(C): 36.7 (27 Oct 2024 05:20), Max: 36.7 (26 Oct 2024 20:01)  T(F): 98.1 (27 Oct 2024 05:20), Max: 98.1 (27 Oct 2024 05:20)  HR: 60 (27 Oct 2024 05:20) (60 - 76)  BP: 115/64 (27 Oct 2024 05:20) (115/64 - 125/78)  BP(mean): --  RR: 16 (27 Oct 2024 05:20) (16 - 16)  SpO2: 94% (27 Oct 2024 05:20) (93% - 96%)    Parameters below as of 26 Oct 2024 20:01  Patient On (Oxygen Delivery Method): room air        CONSTITUTIONAL: NAD, well-developed  RESPIRATORY: Normal respiratory effort; lungs are clear to auscultation bilaterally  CARDIOVASCULAR: Regular rate and rhythm, normal S1 and S2, no murmur/rub/gallop; No lower extremity edema; Peripheral pulses are 2+ bilaterally  ABDOMEN: Nontender to palpation, normoactive bowel sounds, no rebound/guarding; No hepatosplenomegaly  MUSCLOSKELETAL: no clubbing or cyanosis of digits; no joint swelling or tenderness to palpation  PSYCH: A+O to person, place, and time; affect appropriate    LABS:    n/a             RADIOLOGY & ADDITIONAL TESTS:  Results Reviewed: yes  Imaging Personally Reviewed: yes   Electrocardiogram Personally Reviewed: yes    COORDINATION OF CARE:  Care Discussed with Consultants/Other Providers [Y/N]: yes  Prior or Outpatient Records Reviewed [Y/N]: yes

## 2024-10-28 PROCEDURE — 99232 SBSQ HOSP IP/OBS MODERATE 35: CPT

## 2024-10-28 RX ADMIN — Medication 300 MILLIGRAM(S): at 05:07

## 2024-10-28 RX ADMIN — DESVENLAFAXINE SUCCINATE 50 MILLIGRAM(S): 50 TABLET, EXTENDED RELEASE ORAL at 11:15

## 2024-10-28 RX ADMIN — Medication 300 MILLIGRAM(S): at 17:38

## 2024-10-28 RX ADMIN — LITHIUM CARBONATE 600 MILLIGRAM(S): 300 CAPSULE ORAL at 11:15

## 2024-10-28 NOTE — PROGRESS NOTE ADULT - SUBJECTIVE AND OBJECTIVE BOX
Patient is a 19y old  Male who presents with a chief complaint of Placement    Patient seen and examined at bedside. no overnight events or current complaints.     ALLERGIES:  No Known Allergies    MEDICATIONS  (STANDING):  desvenlafaxine ER 50 milliGRAM(s) Oral daily  lithium SR (LITHOBID) 600 milliGRAM(s) Oral daily  QUEtiapine 300 milliGRAM(s) Oral two times a day    MEDICATIONS  (PRN):  acetaminophen     Tablet .. 650 milliGRAM(s) Oral every 6 hours PRN Temp greater or equal to 38C (100.4F), Mild Pain (1 - 3)  aluminum hydroxide/magnesium hydroxide/simethicone Suspension 30 milliLiter(s) Oral every 4 hours PRN Dyspepsia  melatonin 3 milliGRAM(s) Oral at bedtime PRN Insomnia  ondansetron Injectable 4 milliGRAM(s) IV Push every 8 hours PRN Nausea and/or Vomiting  sodium chloride 0.65% Nasal 1 Spray(s) Both Nostrils daily PRN Nasal Congestion    Vital Signs Last 24 Hrs  T(F): 97.1 (28 Oct 2024 05:17), Max: 98.3 (27 Oct 2024 18:21)  HR: 67 (28 Oct 2024 05:17) (67 - 80)  BP: 102/65 (28 Oct 2024 05:17) (102/65 - 138/79)  RR: 16 (28 Oct 2024 05:17) (16 - 16)  SpO2: 96% (28 Oct 2024 05:17) (96% - 97%)  I&O's Summary    PHYSICAL EXAM:  General: NAD, A/O x 3  ENT: MMM, no oral thrush   Neck: Supple, No JVD  Lungs: Clear to auscultation bilaterally, non labored breathing  Cardio: RRR, S1/S2, No murmurs  Abdomen: Soft, Nontender, Nondistended; Bowel sounds present  Extremities: No calf tenderness, No pitting edema    LABS:                                          COVID-19 PCR: NotDetec (10-20-24 @ 15:10)      RADIOLOGY & ADDITIONAL TESTS:    Care Discussed with Consultants/Other Providers:

## 2024-10-28 NOTE — PROGRESS NOTE ADULT - ASSESSMENT
18yo male with PMH of autism, ADHD, history of episodes of agitation and aggression brought in by EMS from home for agitation.    *Agitation with hx of ADHD, autism  - Improved   - Psychiatry recs appreciated  - Medications adjusted, now on Lithium 600 mg daily, Pristiq ER 50 mg daily and Seroquel 300mg BID  - arrangements are being made for discharge, not returning to home, requires housing placement   - Appreciate Social Work involvement   - PPD read yesterday- req. for housing placement     *DVT ppx  OOB, ambulation    GOC/Code Status: Full code    Dispo: Pending emergency housing placement. Medically cleared for discharge when housing in place.

## 2024-10-29 PROCEDURE — 99232 SBSQ HOSP IP/OBS MODERATE 35: CPT

## 2024-10-29 RX ADMIN — LITHIUM CARBONATE 600 MILLIGRAM(S): 300 CAPSULE ORAL at 11:50

## 2024-10-29 RX ADMIN — DESVENLAFAXINE SUCCINATE 50 MILLIGRAM(S): 50 TABLET, EXTENDED RELEASE ORAL at 11:49

## 2024-10-29 RX ADMIN — Medication 300 MILLIGRAM(S): at 05:38

## 2024-10-29 RX ADMIN — Medication 300 MILLIGRAM(S): at 17:10

## 2024-10-29 NOTE — PROGRESS NOTE ADULT - SUBJECTIVE AND OBJECTIVE BOX
PROGRESS NOTE:     Patient is a 19y old  Male who presents with a chief complaint of Placement (28 Oct 2024 10:47)      SUBJECTIVE / OVERNIGHT EVENTS: pt was seen  and evaluated today  no complains offered  he was resting comfortably     ADDITIONAL REVIEW OF SYSTEMS:    MEDICATIONS  (STANDING):  desvenlafaxine ER 50 milliGRAM(s) Oral daily  lithium SR (LITHOBID) 600 milliGRAM(s) Oral daily  QUEtiapine 300 milliGRAM(s) Oral two times a day    MEDICATIONS  (PRN):  acetaminophen     Tablet .. 650 milliGRAM(s) Oral every 6 hours PRN Temp greater or equal to 38C (100.4F), Mild Pain (1 - 3)  aluminum hydroxide/magnesium hydroxide/simethicone Suspension 30 milliLiter(s) Oral every 4 hours PRN Dyspepsia  melatonin 3 milliGRAM(s) Oral at bedtime PRN Insomnia  ondansetron Injectable 4 milliGRAM(s) IV Push every 8 hours PRN Nausea and/or Vomiting  sodium chloride 0.65% Nasal 1 Spray(s) Both Nostrils daily PRN Nasal Congestion      CAPILLARY BLOOD GLUCOSE        I&O's Summary    28 Oct 2024 07:01  -  29 Oct 2024 07:00  --------------------------------------------------------  IN: 640 mL / OUT: 0 mL / NET: 640 mL    29 Oct 2024 07:01  -  29 Oct 2024 11:52  --------------------------------------------------------  IN: 560 mL / OUT: 0 mL / NET: 560 mL        PHYSICAL EXAM:  Vital Signs Last 24 Hrs  T(C): 36.4 (29 Oct 2024 04:49), Max: 36.4 (29 Oct 2024 04:49)  T(F): 97.5 (29 Oct 2024 04:49), Max: 97.5 (29 Oct 2024 04:49)  HR: 71 (29 Oct 2024 04:49) (71 - 75)  BP: 114/75 (29 Oct 2024 04:49) (114/75 - 119/77)  BP(mean): --  RR: 16 (29 Oct 2024 04:49) (16 - 16)  SpO2: 95% (29 Oct 2024 04:49) (95% - 95%)    Parameters below as of 29 Oct 2024 04:49  Patient On (Oxygen Delivery Method): room air          PHYSICAL EXAM:  General: NAD, A/O x 3  ENT: MMM, no oral thrush   Neck: Supple, No JVD  Lungs: Clear to auscultation bilaterally, non labored breathing  Cardio: RRR, S1/S2, No murmurs  Abdomen: Soft, Nontender, Nondistended; Bowel sounds present  Extremities: No calf tenderness, No pitting edema      LABS:    n/a        RADIOLOGY & ADDITIONAL TESTS:  Results Reviewed:  yes  Imaging Personally Reviewed: yes  Electrocardiogram Personally Reviewed: yes     COORDINATION OF CARE:  Care Discussed with Consultants/Other Providers [Y/N]: yes  Prior or Outpatient Records Reviewed [Y/N]: yes

## 2024-10-29 NOTE — PROGRESS NOTE ADULT - ASSESSMENT
20yo male with PMH of autism, ADHD, history of episodes of agitation and aggression brought in by EMS from home for agitation.    *Agitation with hx of ADHD, autism  - Improved   - Psychiatry recs appreciated  - Medications adjusted, now on Lithium 600 mg daily, Pristiq ER 50 mg daily and Seroquel 300mg BID  - arrangements are being made for discharge, not returning to home, requires housing placement   - Appreciate Social Work involvement   - PPD read yesterday- req. for housing placement     *DVT ppx  OOB, ambulation    GOC/Code Status: Full code    Dispo: Pending emergency housing placement. Medically cleared for discharge when housing in place.

## 2024-10-30 ENCOUNTER — TRANSCRIPTION ENCOUNTER (OUTPATIENT)
Age: 19
End: 2024-10-30

## 2024-10-30 PROCEDURE — 99232 SBSQ HOSP IP/OBS MODERATE 35: CPT

## 2024-10-30 RX ADMIN — LITHIUM CARBONATE 600 MILLIGRAM(S): 300 CAPSULE ORAL at 12:44

## 2024-10-30 RX ADMIN — Medication 300 MILLIGRAM(S): at 05:11

## 2024-10-30 RX ADMIN — DESVENLAFAXINE SUCCINATE 50 MILLIGRAM(S): 50 TABLET, EXTENDED RELEASE ORAL at 12:44

## 2024-10-30 RX ADMIN — Medication 300 MILLIGRAM(S): at 17:29

## 2024-10-30 NOTE — DISCHARGE NOTE PROVIDER - CARE PROVIDER_API CALL
Nena Quintanilla  Pediatrics  [address] [place] [address], [address]  [place], [place] [id number]  Phone: [phone]  Fax: [phone]  Follow Up Time:     Velia Paz  Psychiatry  [address] [place] [address]  Phone: [phone]  Fax: [phone]  Follow Up Time:
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
## 2024-10-30 NOTE — DISCHARGE NOTE PROVIDER - NSDCMRMEDTOKEN_GEN_ALL_CORE_FT
lithium 300 mg oral tablet, extended release: 2 tab(s) orally once a day  Pristiq 100 mg oral tablet, extended release: 1 tab(s) orally once a day  Seroquel 300 mg oral tablet: 1 tab(s) orally 2 times a day   desvenlafaxine (as succinate) 50 mg oral tablet, extended release: 1 tab(s) orally once a day  lithium 300 mg oral tablet, extended release: 2 tab(s) orally once a day  QUEtiapine 300 mg oral tablet: 1 tab(s) orally 2 times a day   ALPRAZolam 0.5 mg oral tablet: 1 tab(s) orally every 8 hours as needed for anxiety/restlessness. MDD: 3 tabs  clonazePAM 0.5 mg oral tablet: 1 tab(s) orally once a day AT 6PM MDD: 1 tab  lithium 300 mg oral tablet, extended release: 1 tab(s) orally once a day (at bedtime)  lithium 300 mg oral tablet, extended release: 2 tab(s) orally once a day  pantoprazole 40 mg oral delayed release tablet: 1 tab(s) orally once a day (before a meal)  QUEtiapine 300 mg oral tablet: 1 tab(s) orally once a day At 8AM  QUEtiapine 400 mg oral tablet: 1 tab(s) orally once a day (at bedtime) AT 8PM   ALPRAZolam 0.5 mg oral tablet: 1 tab(s) orally every 8 hours as needed for anxiety/restlessness. MDD: 3 tabs  lithium 300 mg oral tablet, extended release: 3 tab(s) orally once a day Take 2 tablets (600mg)  in the afternoon and Take 1 tablet (300mg) before bedtime.  pantoprazole 40 mg oral delayed release tablet: 1 tab(s) orally once a day (before a meal)  QUEtiapine 300 mg oral tablet: 1 tab(s) orally once a day At 8AM  QUEtiapine 400 mg oral tablet: 1 tab(s) orally once a day (at bedtime) AT 8PM   lithium 300 mg oral tablet, extended release: 2 tab(s) orally once a day Take 600mg at noon and 300mg at 8pm  pantoprazole 40 mg oral delayed release tablet: 1 tab(s) orally once a day (before a meal)  QUEtiapine 300 mg oral tablet: 1 tab(s) orally once a day At 8AM  QUEtiapine 400 mg oral tablet: 1 tab(s) orally once a day (at bedtime) AT 8PM

## 2024-10-30 NOTE — DISCHARGE NOTE PROVIDER - NSDCCPCAREPLAN_GEN_ALL_CORE_FT
PRINCIPAL DISCHARGE DIAGNOSIS  Diagnosis: Aggression  Assessment and Plan of Treatment: psych saw you and your medciations were adjusted.  please continue lithium, pristiq and seroquel     PRINCIPAL DISCHARGE DIAGNOSIS  Diagnosis: Aggression  Assessment and Plan of Treatment: You were admitted for agression.   Psychiatry evaluated you and adjusted your medications.   Follow up with psychiatry outpatient upon discharge.

## 2024-10-30 NOTE — PROGRESS NOTE ADULT - SUBJECTIVE AND OBJECTIVE BOX
PROGRESS NOTE:     Patient is a 19y old  Male who presents with a chief complaint of Placement (29 Oct 2024 11:52)      SUBJECTIVE / OVERNIGHT EVENTS: pt seen and evaluated  no overnight events        ADDITIONAL REVIEW OF SYSTEMS:    MEDICATIONS  (STANDING):  desvenlafaxine ER 50 milliGRAM(s) Oral daily  lithium SR (LITHOBID) 600 milliGRAM(s) Oral daily  QUEtiapine 300 milliGRAM(s) Oral two times a day    MEDICATIONS  (PRN):  acetaminophen     Tablet .. 650 milliGRAM(s) Oral every 6 hours PRN Temp greater or equal to 38C (100.4F), Mild Pain (1 - 3)  aluminum hydroxide/magnesium hydroxide/simethicone Suspension 30 milliLiter(s) Oral every 4 hours PRN Dyspepsia  melatonin 3 milliGRAM(s) Oral at bedtime PRN Insomnia  ondansetron Injectable 4 milliGRAM(s) IV Push every 8 hours PRN Nausea and/or Vomiting  sodium chloride 0.65% Nasal 1 Spray(s) Both Nostrils daily PRN Nasal Congestion      CAPILLARY BLOOD GLUCOSE        I&O's Summary    29 Oct 2024 07:01  -  30 Oct 2024 07:00  --------------------------------------------------------  IN: 1340 mL / OUT: 0 mL / NET: 1340 mL    30 Oct 2024 07:01  -  30 Oct 2024 11:18  --------------------------------------------------------  IN: 520 mL / OUT: 0 mL / NET: 520 mL        PHYSICAL EXAM:  Vital Signs Last 24 Hrs  T(C): 36.3 (30 Oct 2024 05:14), Max: 36.7 (29 Oct 2024 12:32)  T(F): 97.3 (30 Oct 2024 05:14), Max: 98.1 (29 Oct 2024 12:32)  HR: 67 (30 Oct 2024 05:14) (67 - 104)  BP: 114/76 (30 Oct 2024 05:14) (114/76 - 129/85)  BP(mean): --  RR: 16 (30 Oct 2024 05:14) (16 - 16)  SpO2: 95% (30 Oct 2024 05:14) (95% - 96%)    Parameters below as of 30 Oct 2024 05:14  Patient On (Oxygen Delivery Method): room air      PHYSICAL EXAM:  General: NAD, A/O x 3  ENT: MMM, no oral thrush   Neck: Supple, No JVD  Lungs: Clear to auscultation bilaterally, non labored breathing  Cardio: RRR, S1/S2, No murmurs  Abdomen: Soft, Nontender, Nondistended; Bowel sounds present  Extremities: No calf tenderness, No pitting edema      LABS:  n/a       discussed during IDR

## 2024-10-30 NOTE — DISCHARGE NOTE PROVIDER - CARE PROVIDERS DIRECT ADDRESSES
,DirectAddress_Unknown,[email or web address]

## 2024-10-31 PROCEDURE — 99232 SBSQ HOSP IP/OBS MODERATE 35: CPT

## 2024-10-31 RX ORDER — LITHIUM CARBONATE 300 MG/1
2 CAPSULE ORAL
Qty: 0 | Refills: 0 | DISCHARGE
Start: 2024-10-31

## 2024-10-31 RX ORDER — LITHIUM CARBONATE 300 MG/1
2 CAPSULE ORAL
Refills: 0 | DISCHARGE

## 2024-10-31 RX ORDER — DESVENLAFAXINE SUCCINATE 50 MG/1
1 TABLET, EXTENDED RELEASE ORAL
Refills: 0 | DISCHARGE

## 2024-10-31 RX ORDER — DESVENLAFAXINE SUCCINATE 50 MG/1
1 TABLET, EXTENDED RELEASE ORAL
Qty: 0 | Refills: 0 | DISCHARGE
Start: 2024-10-31

## 2024-10-31 RX ADMIN — Medication 300 MILLIGRAM(S): at 17:51

## 2024-10-31 RX ADMIN — LITHIUM CARBONATE 600 MILLIGRAM(S): 300 CAPSULE ORAL at 11:52

## 2024-10-31 RX ADMIN — DESVENLAFAXINE SUCCINATE 50 MILLIGRAM(S): 50 TABLET, EXTENDED RELEASE ORAL at 11:52

## 2024-10-31 RX ADMIN — Medication 300 MILLIGRAM(S): at 07:14

## 2024-10-31 NOTE — PROGRESS NOTE ADULT - NS ATTEND AMEND GEN_ALL_CORE FT
Seen and examined. Chart reviewed.   patient is stable clinically.   Agree with above a/p  Edited where ever is necessary    awaiting placement

## 2024-10-31 NOTE — PROGRESS NOTE ADULT - SUBJECTIVE AND OBJECTIVE BOX
Patient is a 19y old  Male who presents with a chief complaint of Placement (30 Oct 2024 12:22)    Patient seen and examined at bedside.  no acute overnight events    ALLERGIES:  No Known Allergies        Vital Signs Last 24 Hrs  T(F): 97.5 (31 Oct 2024 05:09), Max: 98.2 (30 Oct 2024 18:38)  HR: 57 (31 Oct 2024 05:09) (57 - 97)  BP: 115/72 (31 Oct 2024 05:09) (115/72 - 123/76)  RR: 16 (31 Oct 2024 05:09) (16 - 16)  SpO2: 97% (31 Oct 2024 05:09) (95% - 97%)  I&O's Summary    30 Oct 2024 07:01  -  31 Oct 2024 07:00  --------------------------------------------------------  IN: 1460 mL / OUT: 0 mL / NET: 1460 mL    31 Oct 2024 07:01  -  31 Oct 2024 11:21  --------------------------------------------------------  IN: 1140 mL / OUT: 0 mL / NET: 1140 mL      MEDICATIONS:  acetaminophen     Tablet .. 650 milliGRAM(s) Oral every 6 hours PRN  aluminum hydroxide/magnesium hydroxide/simethicone Suspension 30 milliLiter(s) Oral every 4 hours PRN  desvenlafaxine ER 50 milliGRAM(s) Oral daily  lithium SR (LITHOBID) 600 milliGRAM(s) Oral daily  melatonin 3 milliGRAM(s) Oral at bedtime PRN  ondansetron Injectable 4 milliGRAM(s) IV Push every 8 hours PRN  QUEtiapine 300 milliGRAM(s) Oral two times a day  sodium chloride 0.65% Nasal 1 Spray(s) Both Nostrils daily PRN      PHYSICAL EXAM:  General: NAD, Alert young male resting comfortably  ENT: MMM, no thrush  Neck: Supple, No JVD  Lungs: Clear to auscultation bilaterally, non labored, good air entry  Cardio: RRR, S1/S2, No murmurs  Abdomen: Soft, Nontender, Nondistended; Bowel sounds present  Extremities: No cyanosis, No edema    LABS:                                          COVID-19 PCR: NotDetec (10-20-24 @ 15:10)      RADIOLOGY & ADDITIONAL TESTS:    Care Discussed with Consultants/Other Providers:

## 2024-10-31 NOTE — PROGRESS NOTE ADULT - ASSESSMENT
18yo male with PMH of autism, ADHD, history of episodes of agitation and aggression brought in by EMS from home for agitation.    *Agitation with hx of ADHD, autism  - Improved   - Psychiatry recs appreciated  - Medications adjusted, now on Lithium 600 mg daily, Pristiq ER 50 mg daily and Seroquel 300mg BID  - arrangements are being made for discharge, not returning to home, requires housing placement   - Appreciate Social Work involvement   - PPD read yesterday- req. for housing placement     *DVT ppx  OOB, ambulation    GOC/Code Status: Full code    Dispo: Pending emergency housing placement. Medically cleared for discharge [facility]

## 2024-11-01 RX ADMIN — LITHIUM CARBONATE 600 MILLIGRAM(S): 300 CAPSULE ORAL at 11:27

## 2024-11-01 RX ADMIN — DESVENLAFAXINE SUCCINATE 50 MILLIGRAM(S): 50 TABLET, EXTENDED RELEASE ORAL at 11:27

## 2024-11-01 RX ADMIN — Medication 300 MILLIGRAM(S): at 06:53

## 2024-11-01 RX ADMIN — Medication 300 MILLIGRAM(S): at 17:28

## 2024-11-01 NOTE — CHART NOTE - NSCHARTNOTEFT_GEN_A_CORE
NUTRITION Follow Up Note    SOURCE: Patient [X]  Medical Record [X]  Nursing Staff [X]  Family Member []    DIET: Diet, Regular (10-21-24 @ 12:13) [Active]    Patient noted with good appetite, consuming >75% of meals per nursing flowsheets. No issues with chewing/swallowing. Food preferences obtained and noted on patients file with nutrition office. Electrolytes stable at this time.    EDEMA: no edema noted    LAST BM: 29-Oct-2024    SKIN: no pressure injuries    WEIGHT TRENDS: 93 kG (10/20/24)      PERTINENT MEDICATIONS: MEDICATIONS  (STANDING):  desvenlafaxine ER 50 milliGRAM(s) Oral daily  lithium SR (LITHOBID) 600 milliGRAM(s) Oral daily  QUEtiapine 300 milliGRAM(s) Oral two times a day    MEDICATIONS  (PRN):  acetaminophen     Tablet .. 650 milliGRAM(s) Oral every 6 hours PRN Temp greater or equal to 38C (100.4F), Mild Pain (1 - 3)  aluminum hydroxide/magnesium hydroxide/simethicone Suspension 30 milliLiter(s) Oral every 4 hours PRN Dyspepsia  melatonin 3 milliGRAM(s) Oral at bedtime PRN Insomnia  ondansetron Injectable 4 milliGRAM(s) IV Push every 8 hours PRN Nausea and/or Vomiting  sodium chloride 0.65% Nasal 1 Spray(s) Both Nostrils daily PRN Nasal Congestion      PERTINENT LABS:          ESTIMATED NEEDS:   [X] No Change Since Previous Assessment    PREVIOUS NUTRITION DIAGNOSIS:  No active nutrition Dx at this present time      NUTRITION DIAGNOSIS IS [X] Ongoing     NEW NUTRITION DIAGNOSIS: [X] Not Applicable    INTERVENTIONS:   1. Recommend continue current nutrition plan of care as tolerated     MONITORING & EVALUATION:   1. Weights   2. PO intakes   3. Skin integrity   4. Tolerance to diet prescription   5. Labs & POCT  6. Follow up (per protocol)    Registered Dietitian/Nutritionist Remains Available.  Fermin Mensah RD, CDN    Contact: [phone] or via [place] TEAMS

## 2024-11-01 NOTE — PROGRESS NOTE ADULT - SUBJECTIVE AND OBJECTIVE BOX
Patient is a 19y old  Male who presents with a chief complaint of Placement (31 Oct 2024 11:21)    Patient seen and examined at bedside.  no acute overnight events    ALLERGIES:  No Known Allergies        Vital Signs Last 24 Hrs  T(F): 98.8 (01 Nov 2024 08:20), Max: 98.8 (01 Nov 2024 08:20)  HR: 62 (01 Nov 2024 08:20) (62 - 90)  BP: 101/66 (01 Nov 2024 08:20) (101/66 - 138/71)  RR: 16 (01 Nov 2024 08:20) (16 - 17)  SpO2: 95% (01 Nov 2024 08:20) (95% - 96%)  I&O's Summary    31 Oct 2024 07:01  -  01 Nov 2024 07:00  --------------------------------------------------------  IN: 1860 mL / OUT: 0 mL / NET: 1860 mL      MEDICATIONS:  acetaminophen     Tablet .. 650 milliGRAM(s) Oral every 6 hours PRN  aluminum hydroxide/magnesium hydroxide/simethicone Suspension 30 milliLiter(s) Oral every 4 hours PRN  desvenlafaxine ER 50 milliGRAM(s) Oral daily  lithium SR (LITHOBID) 600 milliGRAM(s) Oral daily  melatonin 3 milliGRAM(s) Oral at bedtime PRN  ondansetron Injectable 4 milliGRAM(s) IV Push every 8 hours PRN  QUEtiapine 300 milliGRAM(s) Oral two times a day  sodium chloride 0.65% Nasal 1 Spray(s) Both Nostrils daily PRN      PHYSICAL EXAM:  General: NAD, A/O x 3  ENT: MMM, no thrush  Neck: Supple, No JVD  Lungs: Clear to auscultation bilaterally, non labored, good air entry  Cardio: RRR, S1/S2, No murmurs  Abdomen: Soft, Nontender, Nondistended; Bowel sounds present  Extremities: No cyanosis, No edema    LABS:                                          COVID-19 PCR: NotDetec (10-20-24 @ 15:10)      RADIOLOGY & ADDITIONAL TESTS:    Care Discussed with Consultants/Other Providers:

## 2024-11-01 NOTE — PROGRESS NOTE ADULT - ASSESSMENT
20yo male with PMH of autism, ADHD, history of episodes of agitation and aggression brought in by EMS from home for agitation.    *Agitation with hx of ADHD, autism  - Improved   - Psychiatry recs appreciated  - Medications adjusted, now on Lithium 600 mg daily, Pristiq ER 50 mg daily and Seroquel 300mg BID  - arrangements are being made for discharge, not returning to home, requires housing placement   - Appreciate Social Work involvement   - PPD read yesterday- req. for housing placement     *DVT ppx  OOB, ambulation    GOC/Code Status: Full code    Dispo: Pending emergency housing placement. Medically cleared for discharge [facility]

## 2024-11-02 RX ADMIN — Medication 300 MILLIGRAM(S): at 05:10

## 2024-11-02 RX ADMIN — LITHIUM CARBONATE 600 MILLIGRAM(S): 300 CAPSULE ORAL at 12:18

## 2024-11-02 RX ADMIN — Medication 300 MILLIGRAM(S): at 17:30

## 2024-11-02 RX ADMIN — DESVENLAFAXINE SUCCINATE 50 MILLIGRAM(S): 50 TABLET, EXTENDED RELEASE ORAL at 12:18

## 2024-11-02 NOTE — PROGRESS NOTE ADULT - SUBJECTIVE AND OBJECTIVE BOX
Patient is a 19y old  Male who presents with a chief complaint of Placement       Patient seen and examined at bedside. Pt states they feel well, denies overnight events or current complaints including chest pain, shortness of breath, dizziness, nausea, vomiting, diarrhea, fever or chills.      ALLERGIES:  No Known Allergies    MEDICATIONS  (STANDING):  desvenlafaxine ER 50 milliGRAM(s) Oral daily  lithium SR (LITHOBID) 600 milliGRAM(s) Oral daily  QUEtiapine 300 milliGRAM(s) Oral two times a day    MEDICATIONS  (PRN):  acetaminophen     Tablet .. 650 milliGRAM(s) Oral every 6 hours PRN Temp greater or equal to 38C (100.4F), Mild Pain (1 - 3)  aluminum hydroxide/magnesium hydroxide/simethicone Suspension 30 milliLiter(s) Oral every 4 hours PRN Dyspepsia  melatonin 3 milliGRAM(s) Oral at bedtime PRN Insomnia  ondansetron Injectable 4 milliGRAM(s) IV Push every 8 hours PRN Nausea and/or Vomiting  sodium chloride 0.65% Nasal 1 Spray(s) Both Nostrils daily PRN Nasal Congestion    Vital Signs Last 24 Hrs  T(F): 98.2 (02 Nov 2024 05:54), Max: 98.2 (02 Nov 2024 05:54)  HR: 67 (02 Nov 2024 05:54) (67 - 106)  BP: 109/69 (02 Nov 2024 05:54) (109/69 - 132/85)  RR: 16 (02 Nov 2024 05:54) (16 - 16)  SpO2: 96% (02 Nov 2024 05:54) (96% - 96%)  I&O's Summary    01 Nov 2024 07:01  -  02 Nov 2024 07:00  --------------------------------------------------------  IN: 540 mL / OUT: 0 mL / NET: 540 mL      PHYSICAL EXAM:  General: NAD, A/O x 3  ENT: MMM, no oral thrush   Neck: Supple, No JVD  Lungs: Clear to auscultation bilaterally, non labored breathing  Cardio: RRR, S1/S2, No murmurs  Abdomen: Soft, Nontender, Nondistended; Bowel sounds present  Extremities: No calf tenderness, No pitting edema    LABS:                                          COVID-19 PCR: NotDetec (10-20-24 @ 15:10)      RADIOLOGY & ADDITIONAL TESTS:    Care Discussed with Consultants/Other Providers:

## 2024-11-03 PROCEDURE — 99232 SBSQ HOSP IP/OBS MODERATE 35: CPT

## 2024-11-03 RX ADMIN — Medication 300 MILLIGRAM(S): at 05:51

## 2024-11-03 RX ADMIN — LITHIUM CARBONATE 600 MILLIGRAM(S): 300 CAPSULE ORAL at 11:01

## 2024-11-03 RX ADMIN — DESVENLAFAXINE SUCCINATE 50 MILLIGRAM(S): 50 TABLET, EXTENDED RELEASE ORAL at 11:01

## 2024-11-03 RX ADMIN — Medication 300 MILLIGRAM(S): at 17:00

## 2024-11-03 NOTE — PROGRESS NOTE ADULT - SUBJECTIVE AND OBJECTIVE BOX
Patient is a 19y old  Male who presents with a chief complaint of Placement    Patient seen and examined at bedside. Pt states they feel well, denies overnight events or current complaints including chest pain, shortness of breath, dizziness, nausea, vomiting, diarrhea, fever or chills.      ALLERGIES:  No Known Allergies    MEDICATIONS  (STANDING):  desvenlafaxine ER 50 milliGRAM(s) Oral daily  lithium SR (LITHOBID) 600 milliGRAM(s) Oral daily  QUEtiapine 300 milliGRAM(s) Oral two times a day    MEDICATIONS  (PRN):  acetaminophen     Tablet .. 650 milliGRAM(s) Oral every 6 hours PRN Temp greater or equal to 38C (100.4F), Mild Pain (1 - 3)  aluminum hydroxide/magnesium hydroxide/simethicone Suspension 30 milliLiter(s) Oral every 4 hours PRN Dyspepsia  melatonin 3 milliGRAM(s) Oral at bedtime PRN Insomnia  ondansetron Injectable 4 milliGRAM(s) IV Push every 8 hours PRN Nausea and/or Vomiting  sodium chloride 0.65% Nasal 1 Spray(s) Both Nostrils daily PRN Nasal Congestion    Vital Signs Last 24 Hrs  T(F): 98.1 (03 Nov 2024 06:36), Max: 98.3 (02 Nov 2024 19:30)  HR: 78 (03 Nov 2024 06:36) (78 - 107)  BP: 121/77 (03 Nov 2024 06:36) (121/77 - 129/83)  RR: 16 (03 Nov 2024 06:36) (16 - 16)  SpO2: 96% (03 Nov 2024 06:36) (96% - 96%)  I&O's Summary    02 Nov 2024 08:01  -  03 Nov 2024 07:00  --------------------------------------------------------  IN: 1140 mL / OUT: 0 mL / NET: 1140 mL      PHYSICAL EXAM:  General: NAD, A/O x 3  ENT: MMM, no oral thrush   Neck: Supple, No JVD  Lungs: Clear to auscultation bilaterally, non labored breathing  Cardio: RRR, S1/S2, No murmurs  Abdomen: Soft, Nontender, Nondistended; Bowel sounds present  Extremities: No calf tenderness, No pitting edema    LABS:                                          COVID-19 PCR: NotDetec (10-20-24 @ 15:10)      RADIOLOGY & ADDITIONAL TESTS:    Care Discussed with Consultants/Other Providers:

## 2024-11-03 NOTE — PROGRESS NOTE ADULT - NS ATTEND AMEND GEN_ALL_CORE FT
Pending emergency housing placement. Medically cleared for discharge  seen on 11/3  no complaints. ROS neg. exam stable

## 2024-11-04 PROCEDURE — 99232 SBSQ HOSP IP/OBS MODERATE 35: CPT

## 2024-11-04 RX ADMIN — Medication 300 MILLIGRAM(S): at 18:01

## 2024-11-04 RX ADMIN — Medication 300 MILLIGRAM(S): at 05:27

## 2024-11-04 RX ADMIN — DESVENLAFAXINE SUCCINATE 50 MILLIGRAM(S): 50 TABLET, EXTENDED RELEASE ORAL at 13:19

## 2024-11-04 RX ADMIN — LITHIUM CARBONATE 600 MILLIGRAM(S): 300 CAPSULE ORAL at 13:19

## 2024-11-04 NOTE — PROGRESS NOTE ADULT - NS ATTEND OPT1A GEN_ALL_CORE
History/Exam
History/Exam
History/Exam/Medical decision making
History/Medical decision making
History/Exam

## 2024-11-04 NOTE — PROGRESS NOTE ADULT - SUBJECTIVE AND OBJECTIVE BOX
Patient is a 19y old  Male who presents with a chief complaint of Placement       Patient seen and examined at bedside. Pt states they feel well, denies overnight events or current complaints including chest pain, shortness of breath, dizziness, nausea, vomiting, diarrhea, fever or chills.      ALLERGIES:  No Known Allergies    MEDICATIONS  (STANDING):  desvenlafaxine ER 50 milliGRAM(s) Oral daily  lithium SR (LITHOBID) 600 milliGRAM(s) Oral daily  QUEtiapine 300 milliGRAM(s) Oral two times a day    MEDICATIONS  (PRN):  acetaminophen     Tablet .. 650 milliGRAM(s) Oral every 6 hours PRN Temp greater or equal to 38C (100.4F), Mild Pain (1 - 3)  aluminum hydroxide/magnesium hydroxide/simethicone Suspension 30 milliLiter(s) Oral every 4 hours PRN Dyspepsia  melatonin 3 milliGRAM(s) Oral at bedtime PRN Insomnia  ondansetron Injectable 4 milliGRAM(s) IV Push every 8 hours PRN Nausea and/or Vomiting  sodium chloride 0.65% Nasal 1 Spray(s) Both Nostrils daily PRN Nasal Congestion    Vital Signs Last 24 Hrs  T(F): 98.7 (04 Nov 2024 07:01), Max: 98.8 (03 Nov 2024 19:06)  HR: 72 (04 Nov 2024 07:01) (72 - 105)  BP: 98/64 (04 Nov 2024 07:01) (98/64 - 141/86)  RR: 18 (04 Nov 2024 07:01) (16 - 18)  SpO2: 97% (04 Nov 2024 07:01) (95% - 97%)  I&O's Summary    03 Nov 2024 07:01  -  04 Nov 2024 07:00  --------------------------------------------------------  IN: 600 mL / OUT: 0 mL / NET: 600 mL      PHYSICAL EXAM:  General: NAD, A/O x 3  ENT: MMM, no oral thrush   Neck: Supple, No JVD  Lungs: Clear to auscultation bilaterally, non labored breathing  Cardio: RRR, S1/S2, No murmurs  Abdomen: Soft, Nontender, Nondistended; Bowel sounds present  Extremities: No calf tenderness, No pitting edema    LABS:                                          COVID-19 PCR: NotDetec (10-20-24 @ 15:10)      RADIOLOGY & ADDITIONAL TESTS:    Care Discussed with Consultants/Other Providers:

## 2024-11-04 NOTE — PROGRESS NOTE ADULT - NS ATTEND AMEND GEN_ALL_CORE FT
Pending emergency housing placement. Medically cleared for discharge  no complaints. ROS neg. exam stable

## 2024-11-05 PROCEDURE — 99232 SBSQ HOSP IP/OBS MODERATE 35: CPT

## 2024-11-05 RX ADMIN — Medication 300 MILLIGRAM(S): at 05:46

## 2024-11-05 RX ADMIN — Medication 300 MILLIGRAM(S): at 17:44

## 2024-11-05 RX ADMIN — LITHIUM CARBONATE 600 MILLIGRAM(S): 300 CAPSULE ORAL at 12:25

## 2024-11-05 RX ADMIN — DESVENLAFAXINE SUCCINATE 50 MILLIGRAM(S): 50 TABLET, EXTENDED RELEASE ORAL at 12:25

## 2024-11-05 NOTE — PROGRESS NOTE ADULT - SUBJECTIVE AND OBJECTIVE BOX
Medicine Progress Note    Patient is a 19y old  Male who presents with a chief complaint of Placement (04 Nov 2024 09:59)      SUBJECTIVE / OVERNIGHT EVENTS:  seen and examined  Chart reviewed  No overnight events  BM+  No pain  No complaints      ROS:  denied fever/chills/CP/SOB/cough/palpitation/dizziness/abdominal pian/nausea/vomiting/diarrhoea/constipation/dysuria/leg or calf pain/headaches.all other ROS neg    MEDICATIONS  (STANDING):  desvenlafaxine ER 50 milliGRAM(s) Oral daily  lithium SR (LITHOBID) 600 milliGRAM(s) Oral daily  QUEtiapine 300 milliGRAM(s) Oral two times a day    MEDICATIONS  (PRN):  acetaminophen     Tablet .. 650 milliGRAM(s) Oral every 6 hours PRN Temp greater or equal to 38C (100.4F), Mild Pain (1 - 3)  aluminum hydroxide/magnesium hydroxide/simethicone Suspension 30 milliLiter(s) Oral every 4 hours PRN Dyspepsia  melatonin 3 milliGRAM(s) Oral at bedtime PRN Insomnia  ondansetron Injectable 4 milliGRAM(s) IV Push every 8 hours PRN Nausea and/or Vomiting  sodium chloride 0.65% Nasal 1 Spray(s) Both Nostrils daily PRN Nasal Congestion    CAPILLARY BLOOD GLUCOSE        I&O's Summary    04 Nov 2024 07:01  -  05 Nov 2024 07:00  --------------------------------------------------------  IN: 1560 mL / OUT: 0 mL / NET: 1560 mL    05 Nov 2024 07:01  -  05 Nov 2024 17:48  --------------------------------------------------------  IN: 720 mL / OUT: 0 mL / NET: 720 mL        PHYSICAL EXAM:  Vital Signs Last 24 Hrs  T(C): 37 (05 Nov 2024 12:25), Max: 37 (05 Nov 2024 12:25)  T(F): 98.6 (05 Nov 2024 12:25), Max: 98.6 (05 Nov 2024 12:25)  HR: 110 (05 Nov 2024 12:25) (80 - 116)  BP: 134/81 (05 Nov 2024 12:25) (113/74 - 134/81)  BP(mean): --  RR: 17 (05 Nov 2024 12:25) (16 - 17)  SpO2: 95% (05 Nov 2024 12:25) (95% - 96%)    Parameters below as of 05 Nov 2024 12:25  Patient On (Oxygen Delivery Method): room air    GENERAL: Not in distress. Alert    HEENT: AT/NC. clear conjuctiva, MMM.   no pallor or icterus  CARDIOVASCULAR: RRR S1, S2. No murmur/rubs/gallop  LUNGS: BLAE+, no rales, no wheezing, no rhonchi.    ABDOMEN: ND. Soft,  NT, no guarding / rebound / rigidity. BS normoactive. No CVA tenderness.    BACK: No spine tenderness.  EXTREMITIES: no edema. no leg or calf TP.  SKIN: no rash. or skin break or ulcer on exposed skin. No cellulitis.  NEUROLOGIC: AAO*3.strength is symmetric, sensation intact, speech fluent.  slow to process information  PSYCHIATRIC: Calm.  No agitation.      LABS:                    COVID-19 PCR: NotDetec (20 Oct 2024 15:10)      RADIOLOGY & ADDITIONAL TESTS:  Imaging from Last 24 Hours:    Electrocardiogram/QTc Interval:    COORDINATION OF CARE:  Care Discussed with Consultants/Other Providers:

## 2024-11-05 NOTE — PROGRESS NOTE ADULT - ASSESSMENT
20yo male with PMH of autism, ADHD, history of episodes of agitation and aggression brought in by EMS from home for agitation.    *Agitation with hx of ADHD, autism  - agitation is  Improved . now calm  - Psychiatry recs appreciated  - Medications adjusted, now on Lithium 600 mg daily, Pristiq ER 50 mg daily and Seroquel 300mg BID  - arrangements are being made for discharge, not returning to home, requires housing placement   - Appreciate Social Work involvement   - PPD read yesterday- req. for housing placement   - psych f/u    *DVT ppx  OOB, ambulation    GOC/Code Status: Full code    Dispo: Pending emergency housing placement. Medically cleared for discharge [facility] 20yo male with PMH of autism, ADHD, history of episodes of agitation and aggression brought in by EMS from home for agitation.    *Agitation with hx of ADHD, autism  - agitation is  Improved . now calm  - Psychiatry recs appreciated  - Medications adjusted, now on Lithium 600 mg daily, Pristiq ER 50 mg daily and Seroquel 300mg BID  - arrangements are being made for discharge, not returning to home, requires housing placement   - Appreciate Social Work involvement   - PPD read yesterday- req. for housing placement   - per mom: patient is not aware of autism diagnosis  - psych f/u    *DVT ppx  OOB, ambulation    GOC/Code Status: Full code    Dispo: Pending emergency housing placement. Medically cleared for discharge. Intake meeting has been scheduled on 11/7. mom and patient are aware.

## 2024-11-06 PROCEDURE — 99232 SBSQ HOSP IP/OBS MODERATE 35: CPT

## 2024-11-06 RX ADMIN — LITHIUM CARBONATE 600 MILLIGRAM(S): 300 CAPSULE ORAL at 12:04

## 2024-11-06 RX ADMIN — Medication 300 MILLIGRAM(S): at 05:07

## 2024-11-06 RX ADMIN — Medication 300 MILLIGRAM(S): at 18:42

## 2024-11-06 RX ADMIN — DESVENLAFAXINE SUCCINATE 50 MILLIGRAM(S): 50 TABLET, EXTENDED RELEASE ORAL at 12:03

## 2024-11-06 NOTE — PROGRESS NOTE ADULT - ASSESSMENT
20yo male with PMH of autism, ADHD, history of episodes of agitation and aggression brought in by EMS from home for agitation.    *Agitation with hx of ADHD, autism  - agitation is  Improved . now calm  - Psychiatry recs appreciated  - Medications adjusted, now on Lithium 600 mg daily, Pristiq ER 50 mg daily and Seroquel 300mg BID  - arrangements are being made for discharge, not returning to home, requires housing placement   - Appreciate Social Work involvement   - PPD read yesterday- req. for housing placement   - per mom: patient is not aware of autism diagnosis  - psych f/u    *DVT ppx  OOB, ambulation    GOC/Code Status: Full code    Dispo: Pending emergency housing placement. Medically cleared for discharge. Intake meeting has been scheduled on 11/7. mom and patient are aware.

## 2024-11-06 NOTE — PROGRESS NOTE ADULT - SUBJECTIVE AND OBJECTIVE BOX
Medicine Progress Note    Patient is a 19y old  Male who presents with a chief complaint of Placement (04 Nov 2024 09:59)      SUBJECTIVE / OVERNIGHT EVENTS:  seen and examined  Chart reviewed  No overnight events  BM+  No pain  No complaints      ROS:  denied fever/chills/CP/SOB/cough/palpitation/dizziness/abdominal pian/nausea/vomiting/diarrhoea/constipation/dysuria/leg or calf pain/headaches.all other ROS neg    MEDICATIONS  (STANDING):  desvenlafaxine ER 50 milliGRAM(s) Oral daily  lithium SR (LITHOBID) 600 milliGRAM(s) Oral daily  QUEtiapine 300 milliGRAM(s) Oral two times a day    MEDICATIONS  (PRN):  acetaminophen     Tablet .. 650 milliGRAM(s) Oral every 6 hours PRN Temp greater or equal to 38C (100.4F), Mild Pain (1 - 3)  aluminum hydroxide/magnesium hydroxide/simethicone Suspension 30 milliLiter(s) Oral every 4 hours PRN Dyspepsia  melatonin 3 milliGRAM(s) Oral at bedtime PRN Insomnia  ondansetron Injectable 4 milliGRAM(s) IV Push every 8 hours PRN Nausea and/or Vomiting  sodium chloride 0.65% Nasal 1 Spray(s) Both Nostrils daily PRN Nasal Congestion    CAPILLARY BLOOD GLUCOSE              PHYSICAL EXAM:    Vital Signs Last 24 Hrs  T(C): 36.7 (11-06-24 @ 11:59), Max: 36.8 (11-05-24 @ 19:24)  T(F): 98 (11-06-24 @ 11:59), Max: 98.2 (11-05-24 @ 19:24)  HR: 121 (11-06-24 @ 11:59) (67 - 121)  BP: 125/80 (11-06-24 @ 11:59) (117/74 - 136/80)  BP(mean): --  RR: 17 (11-06-24 @ 11:59) (16 - 17)  SpO2: 94% (11-06-24 @ 11:59) (94% - 96%)        GENERAL: Not in distress. Alert    HEENT: AT/NC. clear conjuctiva, MMM.   no pallor or icterus  CARDIOVASCULAR: RRR S1, S2. No murmur/rubs/gallop  LUNGS: BLAE+, no rales, no wheezing, no rhonchi.    ABDOMEN: ND. Soft,  NT, no guarding / rebound / rigidity. BS normoactive. No CVA tenderness.    BACK: No spine tenderness.  EXTREMITIES: no edema. no leg or calf TP.  SKIN: no rash. or skin break or ulcer on exposed skin. No cellulitis.  NEUROLOGIC: AAO*3.strength is symmetric, sensation intact, speech fluent.  slow to process information  PSYCHIATRIC: Calm.  No agitation.      LABS:                    COVID-19 PCR: NotDetec (20 Oct 2024 15:10)      RADIOLOGY & ADDITIONAL TESTS:  Imaging from Last 24 Hours:    Electrocardiogram/QTc Interval:    COORDINATION OF CARE:  Care Discussed with Consultants/Other Providers:

## 2024-11-07 PROCEDURE — 99232 SBSQ HOSP IP/OBS MODERATE 35: CPT

## 2024-11-07 PROCEDURE — 99233 SBSQ HOSP IP/OBS HIGH 50: CPT

## 2024-11-07 PROCEDURE — 70450 CT HEAD/BRAIN W/O DYE: CPT | Mod: 26

## 2024-11-07 RX ORDER — LORAZEPAM 2 MG/1
2 TABLET ORAL ONCE
Refills: 0 | Status: DISCONTINUED | OUTPATIENT
Start: 2024-11-07 | End: 2024-11-08

## 2024-11-07 RX ORDER — LORAZEPAM 2 MG/1
2 TABLET ORAL ONCE
Refills: 0 | Status: DISCONTINUED | OUTPATIENT
Start: 2024-11-07 | End: 2024-11-07

## 2024-11-07 RX ADMIN — LITHIUM CARBONATE 600 MILLIGRAM(S): 300 CAPSULE ORAL at 12:14

## 2024-11-07 RX ADMIN — LORAZEPAM 2 MILLIGRAM(S): 2 TABLET ORAL at 18:29

## 2024-11-07 RX ADMIN — DESVENLAFAXINE SUCCINATE 50 MILLIGRAM(S): 50 TABLET, EXTENDED RELEASE ORAL at 12:13

## 2024-11-07 RX ADMIN — Medication 300 MILLIGRAM(S): at 18:29

## 2024-11-07 RX ADMIN — Medication 300 MILLIGRAM(S): at 05:23

## 2024-11-07 NOTE — BH CONSULTATION LIAISON PROGRESS NOTE - NSBHCONSULTPRIMARYDISCUSSYES_PSY_A_CORE FT
case discussed with nurse management and covering PA.   Father made aware of current status of patient.  case discussed with nurse management and covering PA.   Parents made aware of current status of patient.

## 2024-11-07 NOTE — PROGRESS NOTE ADULT - ASSESSMENT
20yo male with PMH of autism, ADHD, history of episodes of agitation and aggression brought in by EMS from home for agitation.    *Agitation with hx of ADHD, autism  - agitation is  Improved . now calm  - Psychiatry recs appreciated  - Medications adjusted, now on Lithium 600 mg daily, Pristiq ER 50 mg daily and Seroquel 300mg BID  - arrangements are being made for discharge, not returning to home, requires housing placement   - Appreciate Social Work involvement   - per mom: patient is not aware of autism diagnosis  - psych f/u, patient having intake interview today for possible placement     *DVT ppx  OOB, ambulation    GOC/Code Status: Full code    Dispo: Pending emergency housing placement. Medically cleared for discharge. Intake meeting has been scheduled for today 11/7. mom and patient are aware.

## 2024-11-07 NOTE — BH CONSULTATION LIAISON PROGRESS NOTE - NSBHCONSULTRECOMMENDOTHER_PSY_A_CORE FT
continue medication as prescribed, consider repeat Lithium level, will consider titration up if remains subtherapeutic or change in preparation ( eg Lithobid or Eskalith).

## 2024-11-07 NOTE — PROGRESS NOTE ADULT - SUBJECTIVE AND OBJECTIVE BOX
Patient is a 19y old  Male who presents with a chief complaint of Placement (06 Nov 2024 16:15)      Patient seen and examined at bedside. No overnight events reported. Patient states he is doing well.     ALLERGIES:  No Known Allergies    MEDICATIONS  (STANDING):  desvenlafaxine ER 50 milliGRAM(s) Oral daily  lithium SR (LITHOBID) 600 milliGRAM(s) Oral daily  QUEtiapine 300 milliGRAM(s) Oral two times a day    MEDICATIONS  (PRN):  acetaminophen     Tablet .. 650 milliGRAM(s) Oral every 6 hours PRN Temp greater or equal to 38C (100.4F), Mild Pain (1 - 3)  aluminum hydroxide/magnesium hydroxide/simethicone Suspension 30 milliLiter(s) Oral every 4 hours PRN Dyspepsia  melatonin 3 milliGRAM(s) Oral at bedtime PRN Insomnia  ondansetron Injectable 4 milliGRAM(s) IV Push every 8 hours PRN Nausea and/or Vomiting  sodium chloride 0.65% Nasal 1 Spray(s) Both Nostrils daily PRN Nasal Congestion    Vital Signs Last 24 Hrs  T(F): 98.2 (07 Nov 2024 06:42), Max: 98.2 (07 Nov 2024 06:42)  HR: 113 (07 Nov 2024 06:42) (113 - 130)  BP: 109/63 (07 Nov 2024 06:42) (109/63 - 136/83)  RR: 17 (07 Nov 2024 06:42) (17 - 17)  SpO2: 95% (07 Nov 2024 06:42) (94% - 95%)  I&O's Summary    06 Nov 2024 07:01  -  07 Nov 2024 07:00  --------------------------------------------------------  IN: 1440 mL / OUT: 0 mL / NET: 1440 mL    07 Nov 2024 07:01  -  07 Nov 2024 10:14  --------------------------------------------------------  IN: 680 mL / OUT: 0 mL / NET: 680 mL      PHYSICAL EXAM:  General: NAD, A/O x 3  ENT: No gross hearing impairment, Moist mucous membranes, no thrush  Neck: Supple, No JVD  Lungs: Clear to auscultation bilaterally, good air entry, non-labored breathing  Cardio: RRR, S1/S2, No murmur  Abdomen: Soft, Nontender, Nondistended; Bowel sounds present  Extremities: No calf tenderness, No cyanosis, No pitting edema  Psych: Appropriate mood and affect    LABS:                                                COVID-19 PCR: NotDetec (10-20-24 @ 15:10)    RADIOLOGY & ADDITIONAL TESTS:    Care Discussed with Consultants/Other Providers:    Patient is a 19y old  Male who presents with a chief complaint of Placement (06 Nov 2024 16:15)      Patient seen and examined at bedside. No overnight events reported. Patient states he is doing well.     ALLERGIES:  No Known Allergies    MEDICATIONS  (STANDING):  desvenlafaxine ER 50 milliGRAM(s) Oral daily  lithium SR (LITHOBID) 600 milliGRAM(s) Oral daily  QUEtiapine 300 milliGRAM(s) Oral two times a day    MEDICATIONS  (PRN):  acetaminophen     Tablet .. 650 milliGRAM(s) Oral every 6 hours PRN Temp greater or equal to 38C (100.4F), Mild Pain (1 - 3)  aluminum hydroxide/magnesium hydroxide/simethicone Suspension 30 milliLiter(s) Oral every 4 hours PRN Dyspepsia  melatonin 3 milliGRAM(s) Oral at bedtime PRN Insomnia  ondansetron Injectable 4 milliGRAM(s) IV Push every 8 hours PRN Nausea and/or Vomiting  sodium chloride 0.65% Nasal 1 Spray(s) Both Nostrils daily PRN Nasal Congestion    Vital Signs Last 24 Hrs  T(F): 98.2 (07 Nov 2024 06:42), Max: 98.2 (07 Nov 2024 06:42)  HR: 113 (07 Nov 2024 06:42) (113 - 130)  BP: 109/63 (07 Nov 2024 06:42) (109/63 - 136/83)  RR: 17 (07 Nov 2024 06:42) (17 - 17)  SpO2: 95% (07 Nov 2024 06:42) (94% - 95%)  I&O's Summary    06 Nov 2024 07:01  -  07 Nov 2024 07:00  --------------------------------------------------------  IN: 1440 mL / OUT: 0 mL / NET: 1440 mL    07 Nov 2024 07:01  -  07 Nov 2024 10:14  --------------------------------------------------------  IN: 680 mL / OUT: 0 mL / NET: 680 mL      PHYSICAL EXAM:  General: NAD, A/O x 3  ENT: No gross hearing impairment, Moist mucous membranes, no thrush  Neck: Supple, No JVD  Lungs: Clear to auscultation bilaterally, good air entry, non-labored breathing  Cardio: RRR, S1/S2, No murmur  Abdomen: Soft, Nontender, Nondistended; Bowel sounds present  Extremities: No calf tenderness, No cyanosis, No pitting edema  Psych: Appropriate mood and affect    LABS: none new              COVID-19 PCR: NotDetec (10-20-24 @ 15:10)    RADIOLOGY & ADDITIONAL TESTS:    Care Discussed with Consultants/Other Providers:   LM/JUDI

## 2024-11-07 NOTE — BH CONSULTATION LIAISON PROGRESS NOTE - NSBHASSESSMENTFT_PSY_ALL_CORE
20yo male withPMH of autism, ADHD, history of episodes of agitation and aggression brought in by EMS from home for agitation.  Pt seen by telepsychiatry and deemed not meeting criteria for acute inpatient care.   Plan is to procure emergency housing through [facility], PPD placed and results sent.  Pt with acting out behavior and self harm ( head banging and destruction of property- pulling out metal rocio in his closet).  Trigger was housing interview today, pt threw out things his father brought him and personal belongings. ( May still be in the garbage in his room).  20yo male withPMH of autism, ADHD, history of episodes of agitation and aggression brought in by EMS from home for agitation.  Pt seen by telepsychiatry and deemed not meeting criteria for acute inpatient care.   Plan is to procure emergency housing through [facility], PPD placed and results sent.  Pt with acting out behavior and self harm ( head banging and destruction of property- pulling out metal rocio in his closet).  Trigger was housing interview today, pt threw out things his father brought him and personal belongings. ( May still be in the garbage in his room).       Parents want it stressed to the patient that the placement is temporary, he may have been upset at the prospect of rules, chores, and structure, as well as they may have voiced taking away his phone for the initial time of his placement. Parents also want it stressed that placement is not punishment or abandonment but a way of his getting to his stated goals of having friends, being active , and being prepared for employment.

## 2024-11-07 NOTE — BH CONSULTATION LIAISON PROGRESS NOTE - NSBHTIMEACTIVITIESPERFORMED_PSY_A_CORE
coordinating with treatment team, review of head CT findings, and counseling patient, medication and chart review.  coordinating with treatment team ,as well as discussing this events with his parents, review of head CT findings, and counseling patient, medication and chart review.

## 2024-11-08 LAB
ALBUMIN SERPL ELPH-MCNC: 3.9 G/DL — SIGNIFICANT CHANGE UP (ref 3.3–5)
ALP SERPL-CCNC: 96 U/L — SIGNIFICANT CHANGE UP (ref 40–120)
ALT FLD-CCNC: 32 U/L — SIGNIFICANT CHANGE UP (ref 10–45)
ANION GAP SERPL CALC-SCNC: 8 MMOL/L — SIGNIFICANT CHANGE UP (ref 5–17)
AST SERPL-CCNC: 21 U/L — SIGNIFICANT CHANGE UP (ref 10–40)
BILIRUB SERPL-MCNC: 1 MG/DL — SIGNIFICANT CHANGE UP (ref 0.2–1.2)
BUN SERPL-MCNC: 12 MG/DL — SIGNIFICANT CHANGE UP (ref 7–23)
CALCIUM SERPL-MCNC: 8.9 MG/DL — SIGNIFICANT CHANGE UP (ref 8.4–10.5)
CHLORIDE SERPL-SCNC: 107 MMOL/L — SIGNIFICANT CHANGE UP (ref 96–108)
CO2 SERPL-SCNC: 25 MMOL/L — SIGNIFICANT CHANGE UP (ref 22–31)
CREAT SERPL-MCNC: 1.09 MG/DL — SIGNIFICANT CHANGE UP (ref 0.5–1.3)
EGFR: 99 ML/MIN/1.73M2 — SIGNIFICANT CHANGE UP
GLUCOSE SERPL-MCNC: 97 MG/DL — SIGNIFICANT CHANGE UP (ref 70–99)
HCT VFR BLD CALC: 47.2 % — SIGNIFICANT CHANGE UP (ref 39–50)
HGB BLD-MCNC: 16.7 G/DL — SIGNIFICANT CHANGE UP (ref 13–17)
LITHIUM SERPL-MCNC: 0.32 MMOL/L — LOW (ref 0.6–1.2)
MCHC RBC-ENTMCNC: 30.9 PG — SIGNIFICANT CHANGE UP (ref 27–34)
MCHC RBC-ENTMCNC: 35.4 G/DL — SIGNIFICANT CHANGE UP (ref 32–36)
MCV RBC AUTO: 87.2 FL — SIGNIFICANT CHANGE UP (ref 80–100)
NRBC # BLD: 0 /100 WBCS — SIGNIFICANT CHANGE UP (ref 0–0)
PLATELET # BLD AUTO: 217 K/UL — SIGNIFICANT CHANGE UP (ref 150–400)
POTASSIUM SERPL-MCNC: 4.2 MMOL/L — SIGNIFICANT CHANGE UP (ref 3.5–5.3)
POTASSIUM SERPL-SCNC: 4.2 MMOL/L — SIGNIFICANT CHANGE UP (ref 3.5–5.3)
PROT SERPL-MCNC: 6.5 G/DL — SIGNIFICANT CHANGE UP (ref 6–8.3)
RBC # BLD: 5.41 M/UL — SIGNIFICANT CHANGE UP (ref 4.2–5.8)
RBC # FLD: 11.5 % — SIGNIFICANT CHANGE UP (ref 10.3–14.5)
SODIUM SERPL-SCNC: 140 MMOL/L — SIGNIFICANT CHANGE UP (ref 135–145)
WBC # BLD: 7.19 K/UL — SIGNIFICANT CHANGE UP (ref 3.8–10.5)
WBC # FLD AUTO: 7.19 K/UL — SIGNIFICANT CHANGE UP (ref 3.8–10.5)

## 2024-11-08 PROCEDURE — 99232 SBSQ HOSP IP/OBS MODERATE 35: CPT

## 2024-11-08 PROCEDURE — 99233 SBSQ HOSP IP/OBS HIGH 50: CPT

## 2024-11-08 RX ORDER — LITHIUM CARBONATE 300 MG/1
300 CAPSULE ORAL AT BEDTIME
Refills: 0 | Status: DISCONTINUED | OUTPATIENT
Start: 2024-11-08 | End: 2024-12-10

## 2024-11-08 RX ORDER — LORAZEPAM 2 MG/1
2 TABLET ORAL ONCE
Refills: 0 | Status: DISCONTINUED | OUTPATIENT
Start: 2024-11-08 | End: 2024-11-08

## 2024-11-08 RX ADMIN — ACETAMINOPHEN, DIPHENHYDRAMINE HCL, PHENYLEPHRINE HCL 3 MILLIGRAM(S): 325; 25; 5 TABLET ORAL at 21:14

## 2024-11-08 RX ADMIN — DESVENLAFAXINE SUCCINATE 50 MILLIGRAM(S): 50 TABLET, EXTENDED RELEASE ORAL at 12:51

## 2024-11-08 RX ADMIN — Medication 300 MILLIGRAM(S): at 06:13

## 2024-11-08 RX ADMIN — ACETAMINOPHEN 500MG 650 MILLIGRAM(S): 500 TABLET, COATED ORAL at 20:07

## 2024-11-08 RX ADMIN — Medication 300 MILLIGRAM(S): at 18:04

## 2024-11-08 RX ADMIN — ACETAMINOPHEN 500MG 650 MILLIGRAM(S): 500 TABLET, COATED ORAL at 19:37

## 2024-11-08 RX ADMIN — LITHIUM CARBONATE 300 MILLIGRAM(S): 300 CAPSULE ORAL at 21:14

## 2024-11-08 RX ADMIN — LITHIUM CARBONATE 600 MILLIGRAM(S): 300 CAPSULE ORAL at 12:51

## 2024-11-08 RX ADMIN — LORAZEPAM 2 MILLIGRAM(S): 2 TABLET ORAL at 19:33

## 2024-11-08 NOTE — CHART NOTE - NSCHARTNOTEFT_GEN_A_CORE
Efraín Hanna was called.  Patient tried to Injured staff with a table.  Ativan 2mg PO PRN was given to the patient.  On examination patient calm, on 1:1.   There is Ativan IM PRN if needed

## 2024-11-08 NOTE — BH CONSULTATION LIAISON PROGRESS NOTE - NSACTIVEVENT_PSY_ALL_CORE
Triggering events leading to humiliation, shame, and/or despair (e.g., Loss of relationship, financial or health status) (real or anticipated)/Perceived burden on family or others
Triggering events leading to humiliation, shame, and/or despair (e.g., Loss of relationship, financial or health status) (real or anticipated)/Perceived burden on family or others

## 2024-11-08 NOTE — PROGRESS NOTE ADULT - ASSESSMENT
20yo male with PMH of autism, ADHD, history of episodes of agitation and aggression brought in by EMS from home for agitation.    *Agitation with hx of ADHD, autism  - agitation is  Improved . now calm  - Psychiatry recs appreciated, continue 1:1 at this time for SI   - Medications adjusted, now on Lithium 600 mg daily, Pristiq ER 50 mg daily and Seroquel 300mg BID  - arrangements are being made for discharge, not returning to home, requires housing placement   - Appreciate Social Work involvement   - per mom: patient is not aware of autism diagnosis  - psych f/u, patient having intake interview today for possible placement     *DVT ppx  OOB, ambulation    GOC/Code Status: Full code    Dispo: Pending emergency housing placement. Medically cleared for discharge. Intake meeting has been scheduled for today 11/7. mom and patient are aware. 18yo male with PMH of autism, ADHD, history of episodes of agitation and aggression brought in by EMS from home for agitation.    *Agitation with hx of ADHD, autism  - agitation is  Improved . now calm  - Psychiatry recs appreciated, continue 1:1 at this time for SI   - Medications adjusted, now on Lithium 600 mg daily, Pristiq ER 50 mg daily and Seroquel 300mg BID  - arrangements are being made for discharge, not returning to home, requires housing placement   - Appreciate Social Work involvement   - per mom: patient is not aware of autism diagnosis, as of yesterday patient is now aware at this time  - CT head reviewed    *DVT ppx  OOB, ambulation    GOC/Code Status: Full code    Dispo: Pending emergency housing placement. Medically cleared for discharge. Intake meeting has been scheduled for today 11/7. mom and patient are aware. 18yo male with PMH of autism, ADHD, history of episodes of agitation and aggression brought in by EMS from home for agitation.    *Agitation with hx of ADHD, autism  - agitation is  Improved . now calm  - Psychiatry recs appreciated, continue 1:1 at this time for SI   - Medications adjusted, now on Lithium 600 mg daily, Pristiq ER 50 mg daily and Seroquel 300mg BID  - arrangements are being made for discharge, not returning to home, requires housing placement   - Appreciate Social Work involvement   - per mom: patient is not aware of autism diagnosis, as of yesterday patient is now aware at this time  - CT head reviewed s/p 11/7 incident where pt hit his head after intake meeting yesterday    *DVT ppx  OOB, ambulation    GOC/Code Status: Full code    Dispo: Pending emergency housing placement. Medically cleared for discharge. Intake meeting was 11/7. mom and patient are aware.

## 2024-11-08 NOTE — BH CONSULTATION LIAISON PROGRESS NOTE - NSBHASSESSMENTFT_PSY_ALL_CORE
18yo male withPMH of autism, ADHD, history of episodes of agitation and aggression brought in by EMS from home for agitation.  Pt seen by telepsychiatry and deemed not meeting criteria for acute inpatient care.   Plan is to procure emergency housing through [facility], PPD placed and results sent.  Pt with acting out behavior and self harm ( head banging and destruction of property- pulling out metal rocio in his closet).  Trigger was housing interview today, pt threw out things his father brought him and personal belongings. ( May still be in the garbage in his room).       Parents want it stressed to the patient that the placement is temporary, he may have been upset at the prospect of rules, chores, and structure, as well as they may have voiced taking away his phone for the initial time of his placement. Parents also want it stressed that placement is not punishment or abandonment but a way of his getting to his stated goals of having friends, being active , and being prepared for employment.

## 2024-11-08 NOTE — BH CONSULTATION LIAISON PROGRESS NOTE - NSUNABLEASSESSPROTRISKCOMMENT_PSY_ALL_CORE
pt in supervised setting , took prn, aware that staff does not  him harshly. 
pt in supervised setting , today able to calm himself on his own.

## 2024-11-08 NOTE — PROGRESS NOTE ADULT - SUBJECTIVE AND OBJECTIVE BOX
Patient is a 19y old  Male who presents with a chief complaint of Placement (07 Nov 2024 10:13)      Patient seen and examined at bedside. No overnight events reported. Patient states he is feeling ok. Denies headache, sob, chest pain, nausea, changes in vision.     ALLERGIES:  No Known Allergies    MEDICATIONS  (STANDING):  desvenlafaxine ER 50 milliGRAM(s) Oral daily  lithium SR (LITHOBID) 600 milliGRAM(s) Oral daily  QUEtiapine 300 milliGRAM(s) Oral two times a day    MEDICATIONS  (PRN):  acetaminophen     Tablet .. 650 milliGRAM(s) Oral every 6 hours PRN Temp greater or equal to 38C (100.4F), Mild Pain (1 - 3)  aluminum hydroxide/magnesium hydroxide/simethicone Suspension 30 milliLiter(s) Oral every 4 hours PRN Dyspepsia  LORazepam   Injectable 2 milliGRAM(s) IV Push once PRN Agitation  melatonin 3 milliGRAM(s) Oral at bedtime PRN Insomnia  ondansetron Injectable 4 milliGRAM(s) IV Push every 8 hours PRN Nausea and/or Vomiting  sodium chloride 0.65% Nasal 1 Spray(s) Both Nostrils daily PRN Nasal Congestion    Vital Signs Last 24 Hrs  T(F): 98 (07 Nov 2024 19:34), Max: 98.3 (07 Nov 2024 12:32)  HR: 72 (07 Nov 2024 19:34) (72 - 117)  BP: 111/74 (07 Nov 2024 19:34) (111/74 - 134/90)  RR: 16 (07 Nov 2024 19:34) (15 - 17)  SpO2: 94% (07 Nov 2024 19:34) (94% - 96%)  I&O's Summary    07 Nov 2024 07:01  -  08 Nov 2024 07:00  --------------------------------------------------------  IN: 1160 mL / OUT: 0 mL / NET: 1160 mL      PHYSICAL EXAM:  General: NAD, A/O x 3  ENT: No gross hearing impairment, Moist mucous membranes, no thrush  Neck: Supple, No JVD  Lungs: Clear to auscultation bilaterally, good air entry, non-labored breathing  Cardio: RRR, S1/S2, No murmur  Abdomen: Soft, Nontender, Nondistended; Bowel sounds present  Extremities: No calf tenderness, No cyanosis, No pitting edema  Psych: Appropriate mood and affect    LABS:                        16.7   7.19  )-----------( 217      ( 08 Nov 2024 08:41 )             47.2     11-08    140  |  107  |  12  ----------------------------<  97  4.2   |  25  |  1.09    Ca    8.9      08 Nov 2024 08:41    TPro  6.5  /  Alb  3.9  /  TBili  1.0  /  DBili  x   /  AST  21  /  ALT  32  /  AlkPhos  96  11-08                                      Urinalysis Basic - ( 08 Nov 2024 08:41 )    Color: x / Appearance: x / SG: x / pH: x  Gluc: 97 mg/dL / Ketone: x  / Bili: x / Urobili: x   Blood: x / Protein: x / Nitrite: x   Leuk Esterase: x / RBC: x / WBC x   Sq Epi: x / Non Sq Epi: x / Bacteria: x        COVID-19 PCR: NotDetec (10-20-24 @ 15:10)    RADIOLOGY & ADDITIONAL TESTS:    Care Discussed with Consultants/Other Providers:    Patient is a 19y old  Male who presents with a chief complaint of Placement (07 Nov 2024 10:13)      Patient seen and examined at bedside. No overnight events reported. Patient states he is feeling ok. Denies headache, sob, chest pain, nausea, changes in vision.     ALLERGIES:  No Known Allergies    MEDICATIONS  (STANDING):  desvenlafaxine ER 50 milliGRAM(s) Oral daily  lithium SR (LITHOBID) 600 milliGRAM(s) Oral daily  QUEtiapine 300 milliGRAM(s) Oral two times a day    MEDICATIONS  (PRN):  acetaminophen     Tablet .. 650 milliGRAM(s) Oral every 6 hours PRN Temp greater or equal to 38C (100.4F), Mild Pain (1 - 3)  aluminum hydroxide/magnesium hydroxide/simethicone Suspension 30 milliLiter(s) Oral every 4 hours PRN Dyspepsia  LORazepam   Injectable 2 milliGRAM(s) IV Push once PRN Agitation  melatonin 3 milliGRAM(s) Oral at bedtime PRN Insomnia  ondansetron Injectable 4 milliGRAM(s) IV Push every 8 hours PRN Nausea and/or Vomiting  sodium chloride 0.65% Nasal 1 Spray(s) Both Nostrils daily PRN Nasal Congestion    Vital Signs Last 24 Hrs  T(F): 98 (07 Nov 2024 19:34), Max: 98.3 (07 Nov 2024 12:32)  HR: 72 (07 Nov 2024 19:34) (72 - 117)  BP: 111/74 (07 Nov 2024 19:34) (111/74 - 134/90)  RR: 16 (07 Nov 2024 19:34) (15 - 17)  SpO2: 94% (07 Nov 2024 19:34) (94% - 96%)  I&O's Summary    07 Nov 2024 07:01  -  08 Nov 2024 07:00  --------------------------------------------------------  IN: 1160 mL / OUT: 0 mL / NET: 1160 mL      PHYSICAL EXAM:  General: NAD, A/O x 3  ENT: No gross hearing impairment, Moist mucous membranes, no thrush  Neck: Supple, No JVD  Lungs: Clear to auscultation bilaterally, good air entry, non-labored breathing  Cardio: RRR, S1/S2, No murmur  Abdomen: Soft, Nontender, Nondistended; Bowel sounds present  Extremities: No calf tenderness, No cyanosis, No pitting edema  Psych: Appropriate mood and affect    LABS:                        16.7   7.19  )-----------( 217      ( 08 Nov 2024 08:41 )             47.2     11-08    140  |  107  |  12  ----------------------------<  97  4.2   |  25  |  1.09    Ca    8.9      08 Nov 2024 08:41    TPro  6.5  /  Alb  3.9  /  TBili  1.0  /  DBili  x   /  AST  21  /  ALT  32  /  AlkPhos  96  11-08                                      Urinalysis Basic - ( 08 Nov 2024 08:41 )    Color: x / Appearance: x / SG: x / pH: x  Gluc: 97 mg/dL / Ketone: x  / Bili: x / Urobili: x   Blood: x / Protein: x / Nitrite: x   Leuk Esterase: x / RBC: x / WBC x   Sq Epi: x / Non Sq Epi: x / Bacteria: x        COVID-19 PCR: NotDetec (10-20-24 @ 15:10)    RADIOLOGY & ADDITIONAL TESTS:    Care Discussed with Consultants/Other Providers:   yes with psych

## 2024-11-08 NOTE — PROGRESS NOTE ADULT - NS ATTEND AMEND GEN_ALL_CORE FT
reviewed AM labs, stable. lithium level ordered per psych recs yesterday. reviewed AM labs, stable. lithium level ordered per psych recs yesterday.  Spoke with Dr. Horta after pt had episode this afternoon where he was hitting himself again and depressed. supportive care provided. will order Ativan as PRN and Dr. Horta to see pt today

## 2024-11-08 NOTE — BH CONSULTATION LIAISON PROGRESS NOTE - NSBHCONSULTRECOMMENDOTHER_PSY_A_CORE FT
continue medication as prescribed, consider repeat Lithium level, will consider titration up if remains subtherapeutic or change in preparation ( eg Lithobid or Eskalith).   Will increase Lithium dose- add 300 mg po at bedtime.   Continue Pristiq  Continue Quetiapine, pt has done best with this medication regimen, no EPS or akathisia noted.

## 2024-11-08 NOTE — BH CONSULTATION LIAISON PROGRESS NOTE - NSCOMMENTSUICRISKFACT_PSY_ALL_CORE
Family pursuing emergency housing, pt had his phone interview today. 
Family pursuing emergency housing, pt had his phone interview today.

## 2024-11-08 NOTE — BH CONSULTATION LIAISON PROGRESS NOTE - NSSUICPROTFACTOTHER_PSY_ALL_CORE
future oriented, self harm tends to de escalate once expressed. 
future oriented, self harm tends to de escalate once expressed.

## 2024-11-08 NOTE — BH CONSULTATION LIAISON PROGRESS NOTE - PATIENT HAS
New onset suicidality/Aggression since prior evaluation…
New onset suicidality/Aggression since prior evaluation…

## 2024-11-08 NOTE — CHART NOTE - NSCHARTNOTEFT_GEN_A_CORE
NUTRITION Follow Up Note    SOURCE: Patient [X]  Medical Record [X]  Nursing Staff [X]  Family Member []    DIET: Diet, Regular (10-21-24 @ 12:13) [Active]    Patient noted with good appetite consuming >75% of meals per nursing flowsheets. No issues with chewing or swallowing. Food preferences obtained and noted on patients file with nutrition office. Electrolytes stable at this time.    EDEMA: no edema noted    LAST BM: 11/4/24    SKIN: no pressure injuries noted    WEIGHT TRENDS: 93 kG (10/20/24)      PERTINENT MEDICATIONS: MEDICATIONS  (STANDING):  desvenlafaxine ER 50 milliGRAM(s) Oral daily  lithium SR (LITHOBID) 600 milliGRAM(s) Oral daily  QUEtiapine 300 milliGRAM(s) Oral two times a day    MEDICATIONS  (PRN):  acetaminophen     Tablet .. 650 milliGRAM(s) Oral every 6 hours PRN Temp greater or equal to 38C (100.4F), Mild Pain (1 - 3)  aluminum hydroxide/magnesium hydroxide/simethicone Suspension 30 milliLiter(s) Oral every 4 hours PRN Dyspepsia  LORazepam   Injectable 2 milliGRAM(s) IV Push once PRN Agitation  melatonin 3 milliGRAM(s) Oral at bedtime PRN Insomnia  ondansetron Injectable 4 milliGRAM(s) IV Push every 8 hours PRN Nausea and/or Vomiting  sodium chloride 0.65% Nasal 1 Spray(s) Both Nostrils daily PRN Nasal Congestion      PERTINENT LABS:  11-08 Na140 mmol/L Glu 97 mg/dL K+ 4.2 mmol/L Cr  1.09 mg/dL BUN 12 mg/dL 11-08 Alb 3.9 g/dL        ESTIMATED NEEDS:   [X] No Change Since Previous Assessment    PREVIOUS NUTRITION DIAGNOSIS:  No active nutrition Dx at this present time    NUTRITION DIAGNOSIS IS [X] Ongoing     NEW NUTRITION DIAGNOSIS: [X] Not Applicable    INTERVENTIONS:   1. Recommend continue current nutrition plan of care as tolerated     MONITORING & EVALUATION:   1. Weights   2. PO intakes   3. Skin integrity   4. Tolerance to diet prescription   5. Labs & POCT  6. Follow up (per protocol)    Registered Dietitian/Nutritionist Remains Available.  Fermin Mensah RD, CDN    Contact: [phone] or via [place] TEAMS

## 2024-11-08 NOTE — BH CONSULTATION LIAISON PROGRESS NOTE - RISK ASSESSMENT
Per previous evaluations at present pt intermittent episodes of agitation have not been amenable to psychopharmacological management, pt overall has been in good control since his admission, pt has a specific trigger, and pt generally de-escalates quickly.  Pt however has poor coping skills in that autism may hamper his ability to understand cause and effect and to decrease all or none thinking.  Pt has difficulty anticipating his feelings and so only recognizes his distress in hindsight. 
Per previous evaluations at present pt intermittent episodes of agitation have not been amenable to psychopharmacological management, pt overall has been in good control since his admission, pt has a specific trigger, and pt generally de-escalates quickly.  Pt however has poor coping skills in that autism may hamper his ability to understand cause and effect and to decrease all or none thinking.  Pt has difficulty anticipating his feelings and so only recognizes his distress in hindsight.

## 2024-11-08 NOTE — BH CONSULTATION LIAISON PROGRESS NOTE - DETAILS
non suicidal self injury- hitting himself and head banging due to poor frustration tolerance and possible realization that parents are not going to take him home. 
non suicidal self injury- hitting himself and head banging due to poor frustration tolerance and possible realization that parents are not going to take him home.

## 2024-11-09 PROCEDURE — 99231 SBSQ HOSP IP/OBS SF/LOW 25: CPT

## 2024-11-09 PROCEDURE — 99232 SBSQ HOSP IP/OBS MODERATE 35: CPT

## 2024-11-09 RX ORDER — LORAZEPAM 2 MG/1
2 TABLET ORAL DAILY
Refills: 0 | Status: DISCONTINUED | OUTPATIENT
Start: 2024-11-09 | End: 2024-11-10

## 2024-11-09 RX ADMIN — DESVENLAFAXINE SUCCINATE 50 MILLIGRAM(S): 50 TABLET, EXTENDED RELEASE ORAL at 12:24

## 2024-11-09 RX ADMIN — LITHIUM CARBONATE 300 MILLIGRAM(S): 300 CAPSULE ORAL at 21:29

## 2024-11-09 RX ADMIN — Medication 300 MILLIGRAM(S): at 17:46

## 2024-11-09 RX ADMIN — LITHIUM CARBONATE 600 MILLIGRAM(S): 300 CAPSULE ORAL at 12:24

## 2024-11-09 RX ADMIN — Medication 300 MILLIGRAM(S): at 05:40

## 2024-11-09 RX ADMIN — LORAZEPAM 2 MILLIGRAM(S): 2 TABLET ORAL at 13:28

## 2024-11-09 NOTE — PROGRESS NOTE ADULT - SUBJECTIVE AND OBJECTIVE BOX
Patient is a 19y old  Male who presents with a chief complaint of Placement (08 Nov 2024 10:56)      Patient seen and examined at bedside.  Efraín Asencio was called overnight, pt was trying to throw furniture.    Pt offers no complaints today.     ALLERGIES:  No Known Allergies    MEDICATIONS  (STANDING):  desvenlafaxine ER 50 milliGRAM(s) Oral daily  lithium SR (LITHOBID) 600 milliGRAM(s) Oral daily  lithium SR (LITHOBID) 300 milliGRAM(s) Oral at bedtime  QUEtiapine 300 milliGRAM(s) Oral two times a day    MEDICATIONS  (PRN):  acetaminophen     Tablet .. 650 milliGRAM(s) Oral every 6 hours PRN Temp greater or equal to 38C (100.4F), Mild Pain (1 - 3)  aluminum hydroxide/magnesium hydroxide/simethicone Suspension 30 milliLiter(s) Oral every 4 hours PRN Dyspepsia  LORazepam   Injectable 2 milliGRAM(s) IntraMuscular once PRN Agitation  melatonin 3 milliGRAM(s) Oral at bedtime PRN Insomnia  ondansetron Injectable 4 milliGRAM(s) IV Push every 8 hours PRN Nausea and/or Vomiting  sodium chloride 0.65% Nasal 1 Spray(s) Both Nostrils daily PRN Nasal Congestion    Vital Signs Last 24 Hrs  T(F): 97.8 (09 Nov 2024 05:55), Max: 97.8 (09 Nov 2024 05:55)  HR: 81 (09 Nov 2024 05:55) (81 - 86)  BP: 107/61 (09 Nov 2024 05:55) (107/61 - 114/72)  RR: 16 (09 Nov 2024 05:55) (16 - 16)  SpO2: 95% (09 Nov 2024 05:55) (95% - 96%)  I&O's Summary    PHYSICAL EXAM:  General: NAD, pt sleepy but arousable.   ENT: No gross hearing impairment, Moist mucous membranes, no thrush  Neck: Supple, No JVD  Lungs: Clear to auscultation bilaterally, good air entry, non-labored breathing  Cardio: RRR, S1/S2, No murmur  Abdomen: Soft, Nontender, Nondistended; Bowel sounds present  Extremities: No calf tenderness, No cyanosis, No pitting edema  Psych: mood calm    LABS:                        16.7   7.19  )-----------( 217      ( 08 Nov 2024 08:41 )             47.2     11-08    140  |  107  |  12  ----------------------------<  97  4.2   |  25  |  1.09    Ca    8.9      08 Nov 2024 08:41    TPro  6.5  /  Alb  3.9  /  TBili  1.0  /  DBili  x   /  AST  21  /  ALT  32  /  AlkPhos  96  11-08                                      Urinalysis Basic - ( 08 Nov 2024 08:41 )    Color: x / Appearance: x / SG: x / pH: x  Gluc: 97 mg/dL / Ketone: x  / Bili: x / Urobili: x   Blood: x / Protein: x / Nitrite: x   Leuk Esterase: x / RBC: x / WBC x   Sq Epi: x / Non Sq Epi: x / Bacteria: x        COVID-19 PCR: NotDetec (10-20-24 @ 15:10)    RADIOLOGY & ADDITIONAL TESTS:    Care Discussed with Consultants/Other Providers:    Patient is a 19y old  Male who presents with a chief complaint of Placement (08 Nov 2024 10:56)      Patient seen and examined at bedside.  Efraín Asencio was called overnight, pt was trying to throw furniture.    Pt offers no complaints today.     ALLERGIES:  No Known Allergies    MEDICATIONS  (STANDING):  desvenlafaxine ER 50 milliGRAM(s) Oral daily  lithium SR (LITHOBID) 600 milliGRAM(s) Oral daily  lithium SR (LITHOBID) 300 milliGRAM(s) Oral at bedtime  QUEtiapine 300 milliGRAM(s) Oral two times a day    MEDICATIONS  (PRN):  acetaminophen     Tablet .. 650 milliGRAM(s) Oral every 6 hours PRN Temp greater or equal to 38C (100.4F), Mild Pain (1 - 3)  aluminum hydroxide/magnesium hydroxide/simethicone Suspension 30 milliLiter(s) Oral every 4 hours PRN Dyspepsia  LORazepam   Injectable 2 milliGRAM(s) IntraMuscular once PRN Agitation  melatonin 3 milliGRAM(s) Oral at bedtime PRN Insomnia  ondansetron Injectable 4 milliGRAM(s) IV Push every 8 hours PRN Nausea and/or Vomiting  sodium chloride 0.65% Nasal 1 Spray(s) Both Nostrils daily PRN Nasal Congestion    Vital Signs Last 24 Hrs  T(F): 97.8 (09 Nov 2024 05:55), Max: 97.8 (09 Nov 2024 05:55)  HR: 81 (09 Nov 2024 05:55) (81 - 86)  BP: 107/61 (09 Nov 2024 05:55) (107/61 - 114/72)  RR: 16 (09 Nov 2024 05:55) (16 - 16)  SpO2: 95% (09 Nov 2024 05:55) (95% - 96%)  I&O's Summary    PHYSICAL EXAM:  General: NAD, pt sleepy but arousable.   ENT: No gross hearing impairment, Moist mucous membranes, no thrush  Neck: Supple, No JVD  Lungs: Clear to auscultation bilaterally, good air entry, non-labored breathing  Cardio: RRR, S1/S2, No murmur  Abdomen: Soft, Nontender, Nondistended; Bowel sounds present  Extremities: No calf tenderness, No cyanosis, No pitting edema  Psych: mood calm but appears upset    LABS:                        16.7   7.19  )-----------( 217      ( 08 Nov 2024 08:41 )             47.2     11-08    140  |  107  |  12  ----------------------------<  97  4.2   |  25  |  1.09    Ca    8.9      08 Nov 2024 08:41    TPro  6.5  /  Alb  3.9  /  TBili  1.0  /  DBili  x   /  AST  21  /  ALT  32  /  AlkPhos  96  11-08                                      Urinalysis Basic - ( 08 Nov 2024 08:41 )    Color: x / Appearance: x / SG: x / pH: x  Gluc: 97 mg/dL / Ketone: x  / Bili: x / Urobili: x   Blood: x / Protein: x / Nitrite: x   Leuk Esterase: x / RBC: x / WBC x   Sq Epi: x / Non Sq Epi: x / Bacteria: x        COVID-19 PCR: NotDetec (10-20-24 @ 15:10)    RADIOLOGY & ADDITIONAL TESTS:    Care Discussed with Consultants/Other Providers:

## 2024-11-09 NOTE — PROGRESS NOTE ADULT - ASSESSMENT
18yo male with PMH of autism, ADHD, history of episodes of agitation and aggression brought in by EMS from home for agitation.    *Agitation with hx of ADHD, autism  - pt with episode of agitation overnight per Resident/Nurse report, cont 1:1  - Psychiatry recs appreciated, continue 1:1 at this time for SI   - Medications adjusted, now on Lithium 600 mg daily, Pristiq ER 50 mg daily and Seroquel 300mg BID  - arrangements are being made for discharge, not returning to home, requires housing placement   - Appreciate Social Work involvement   - per mom: patient is not aware of autism diagnosis, as of yesterday patient is now aware at this time  - CT head reviewed s/p 11/7 incident where pt hit his head after intake meeting yesterday    *DVT ppx  OOB, ambulation    GOC/Code Status: Full code    Dispo: Pending emergency housing placement. Medically cleared for discharge. Intake meeting was 11/7, will update Mom if we have more housing information today.  18yo male with PMH of autism, ADHD, history of episodes of agitation and aggression brought in by EMS from home for agitation.    *Agitation with hx of ADHD, autism  - pt with episode of agitation overnight per Resident/Nurse report, cont 1:1  - Psychiatry recs appreciated, continue 1:1 at this time for SI   - Medications adjusted, now on Lithium 600 mg daily, Lithium 300mg qhs, Pristiq ER 50 mg daily and Seroquel 300mg BID  - arrangements are being made for discharge, not returning to home, requires housing placement   - Appreciate Social Work involvement   - per mom: patient is not aware of autism diagnosis, as of yesterday patient is now aware at this time  - CT head reviewed s/p 11/7 incident where pt hit his head after intake meeting      *DVT ppx  OOB, ambulation    GOC/Code Status: Full code    Dispo: Pending emergency housing placement. Intake meeting was 11/7, will update Mom if we have more housing information today.

## 2024-11-09 NOTE — PROGRESS NOTE ADULT - NS ATTEND AMEND GEN_ALL_CORE FT
spoke with pt this AM who stated he "hates himself". I asked him to explain his feelings further but he was unable to, sleepy this AM. discussed plan with SW to find new place for him for dispo from hospital. Pt notes being nervous, supportive care given.  will add Ativan 2mg PO PRN agitation

## 2024-11-09 NOTE — BH CONSULTATION LIAISON PROGRESS NOTE - NSBHCONSULTRECOMMENDOTHER_PSY_A_CORE FT
Repeat Lithium level 11/12 or 11/13.  Continue to pursue housing.   Agree with add on of recreation /creative arts therapy, pt in particular likes sports and reads well.  Would set limits in terms of expectation of behavior and if escalating offer prn early.

## 2024-11-10 PROCEDURE — 99231 SBSQ HOSP IP/OBS SF/LOW 25: CPT

## 2024-11-10 PROCEDURE — 99232 SBSQ HOSP IP/OBS MODERATE 35: CPT

## 2024-11-10 RX ADMIN — LORAZEPAM 2 MILLIGRAM(S): 2 TABLET ORAL at 17:00

## 2024-11-10 RX ADMIN — Medication 300 MILLIGRAM(S): at 05:28

## 2024-11-10 RX ADMIN — LITHIUM CARBONATE 600 MILLIGRAM(S): 300 CAPSULE ORAL at 11:49

## 2024-11-10 RX ADMIN — LITHIUM CARBONATE 300 MILLIGRAM(S): 300 CAPSULE ORAL at 21:41

## 2024-11-10 RX ADMIN — Medication 300 MILLIGRAM(S): at 18:02

## 2024-11-10 RX ADMIN — DESVENLAFAXINE SUCCINATE 50 MILLIGRAM(S): 50 TABLET, EXTENDED RELEASE ORAL at 11:50

## 2024-11-10 NOTE — BH CONSULTATION LIAISON PROGRESS NOTE - NSBHASSESSMENTFT_PSY_ALL_CORE
18yo male withPMH of autism, ADHD, history of episodes of agitation and aggression brought in by EMS from home for agitation.  Pt seen by telepsychiatry and deemed not meeting criteria for acute inpatient care.   Plan is to procure emergency housing through [facility], PPD placed and results sent.  Pt had phone interview which prompted increased acting out , the majority of his stay on the medical floor had been uneventful.   Pt started to head bang and briefly voiced suicidal ideation and feelings that he did not deserve happiness, beginning to gain insight into   the consequences of aggressive behaviors at home but also seems to be processing his diagnosis of Autism spectrum disorder and the impact this  has had on his relationships with others and himself.

## 2024-11-10 NOTE — PROGRESS NOTE ADULT - ASSESSMENT
20yo male with PMH of autism, ADHD, history of episodes of agitation and aggression brought in by EMS from home for agitation.    *Agitation with hx of ADHD, autism  - pt with episode of agitation overnight per Resident/Nurse report, cont 1:1  - Psychiatry recs appreciated, continue 1:1 at this time for SI   - Medications adjusted, now on Lithium 600 mg daily, Lithium 300mg qhs, Pristiq ER 50 mg daily and Seroquel 300mg BID  - arrangements are being made for discharge, not returning to home, requires housing placement   - Appreciate Social Work involvement   - per mom: patient is not aware of autism diagnosis, as of yesterday patient is now aware at this time  - CT head reviewed s/p 11/7 incident where pt hit his head after intake meeting      *DVT ppx  OOB, ambulation    GOC/Code Status: Full code    Dispo: Pending emergency housing placement. Intake meeting was 11/7, will update Mom if we have more housing information today.  18yo male with PMH of autism, ADHD, history of episodes of agitation and aggression brought in by EMS from home for agitation.    *Agitation with hx of ADHD, autism  - pt with episodes of agitation, cont 1:1  - Psychiatry recs appreciated, also rec is for 1:1 at this time for SI   - Medications adjusted, now on Lithium 600 mg daily, Lithium 300mg qhs, Pristiq ER 50 mg daily and Seroquel 300mg BID  - arrangements are being made for discharge, not returning to home, requires housing placement   - Appreciate Social Work involvement   - per mom: patient is not aware of autism diagnosis, as of yesterday patient is now aware at this time  - CT head reviewed s/p 11/7 incident where pt hit his head after intake meeting      *DVT ppx  OOB, ambulation    GOC/Code Status: Full code    Dispo: Pending emergency housing placement. Intake meeting was 11/7.  Per SW will have more housing information tomorrow, we will update his Mother.  20yo male with PMH of autism, ADHD, history of episodes of agitation and aggression brought in by EMS from home for agitation.    *Agitation with hx of ADHD, autism  - pt with episodes of agitation, cont 1:1  - Psychiatry recs appreciated, also rec is for 1:1 at this time for SI   - Medications adjusted, now on Lithium 600 mg daily, Lithium 300mg qhs, Pristiq ER 50 mg daily and Seroquel 300mg BID; Ativan 2mg PO PRN agitation  - arrangements are being made for discharge, not returning to home, requires housing placement   - Appreciate Social Work involvement   - per mom: patient is not aware of autism diagnosis, patient is now aware at this time  - CT head reviewed s/p 11/7 incident where pt hit his head after intake meeting      *DVT ppx  OOB, ambulation    GOC/Code Status: Full code    Dispo: Pending emergency housing placement. Intake meeting was 11/7.  Per SW will have more housing information tomorrow, we will update his Mother.

## 2024-11-10 NOTE — PROGRESS NOTE ADULT - NS ATTEND AMEND GEN_ALL_CORE FT
spoke to pt at bedside who reports he feels like has no confidence. Encouraged pt to think about positives such as good physical health. Discussed going in steps and having short term goals instead of getting worked up about long term goals. Supportive care given. Pt was calm and appreciative of discussion. Will have recreational therapy consult tomorrow

## 2024-11-10 NOTE — PROGRESS NOTE ADULT - SUBJECTIVE AND OBJECTIVE BOX
Patient is a 19y old  Male who presents with a chief complaint of Placement (09 Nov 2024 12:04)      Patient seen and examined at bedside. No overnight events reported.     ALLERGIES:  No Known Allergies    MEDICATIONS  (STANDING):  desvenlafaxine ER 50 milliGRAM(s) Oral daily  lithium SR (LITHOBID) 600 milliGRAM(s) Oral daily  lithium SR (LITHOBID) 300 milliGRAM(s) Oral at bedtime  QUEtiapine 300 milliGRAM(s) Oral two times a day    MEDICATIONS  (PRN):  acetaminophen     Tablet .. 650 milliGRAM(s) Oral every 6 hours PRN Temp greater or equal to 38C (100.4F), Mild Pain (1 - 3)  aluminum hydroxide/magnesium hydroxide/simethicone Suspension 30 milliLiter(s) Oral every 4 hours PRN Dyspepsia  LORazepam     Tablet 2 milliGRAM(s) Oral daily PRN Agitation  LORazepam   Injectable 2 milliGRAM(s) IntraMuscular once PRN Agitation  melatonin 3 milliGRAM(s) Oral at bedtime PRN Insomnia  ondansetron Injectable 4 milliGRAM(s) IV Push every 8 hours PRN Nausea and/or Vomiting  sodium chloride 0.65% Nasal 1 Spray(s) Both Nostrils daily PRN Nasal Congestion    Vital Signs Last 24 Hrs  T(F): 98.7 (09 Nov 2024 19:46), Max: 98.7 (09 Nov 2024 19:46)  HR: 102 (09 Nov 2024 19:46) (102 - 102)  BP: 110/75 (09 Nov 2024 19:46) (110/75 - 110/75)  RR: 16 (09 Nov 2024 19:46) (16 - 16)  SpO2: 93% (09 Nov 2024 19:46) (93% - 93%)  I&O's Summary    PHYSICAL EXAM:  General: NAD, A/O x 3  ENT: No gross hearing impairment, Moist mucous membranes, no thrush  Neck: Supple, No JVD  Lungs: Clear to auscultation bilaterally, good air entry, non-labored breathing  Cardio: RRR, S1/S2, No murmur  Abdomen: Soft, Nontender, Nondistended; Bowel sounds present  Extremities: No calf tenderness, No cyanosis, No pitting edema  Psych: Appropriate mood and affect    LABS:                        16.7   7.19  )-----------( 217      ( 08 Nov 2024 08:41 )             47.2     11-08    140  |  107  |  12  ----------------------------<  97  4.2   |  25  |  1.09    Ca    8.9      08 Nov 2024 08:41    TPro  6.5  /  Alb  3.9  /  TBili  1.0  /  DBili  x   /  AST  21  /  ALT  32  /  AlkPhos  96  11-08                                      Urinalysis Basic - ( 08 Nov 2024 08:41 )    Color: x / Appearance: x / SG: x / pH: x  Gluc: 97 mg/dL / Ketone: x  / Bili: x / Urobili: x   Blood: x / Protein: x / Nitrite: x   Leuk Esterase: x / RBC: x / WBC x   Sq Epi: x / Non Sq Epi: x / Bacteria: x        COVID-19 PCR: NotDetec (10-20-24 @ 15:10)    RADIOLOGY & ADDITIONAL TESTS:    Care Discussed with Consultants/Other Providers:    Patient is a 19y old  Male who presents with a chief complaint of Placement (09 Nov 2024 12:04)      Patient seen and examined at bedside. No overnight events reported.     ALLERGIES:  No Known Allergies    MEDICATIONS  (STANDING):  desvenlafaxine ER 50 milliGRAM(s) Oral daily  lithium SR (LITHOBID) 600 milliGRAM(s) Oral daily  lithium SR (LITHOBID) 300 milliGRAM(s) Oral at bedtime  QUEtiapine 300 milliGRAM(s) Oral two times a day    MEDICATIONS  (PRN):  acetaminophen     Tablet .. 650 milliGRAM(s) Oral every 6 hours PRN Temp greater or equal to 38C (100.4F), Mild Pain (1 - 3)  aluminum hydroxide/magnesium hydroxide/simethicone Suspension 30 milliLiter(s) Oral every 4 hours PRN Dyspepsia  LORazepam     Tablet 2 milliGRAM(s) Oral daily PRN Agitation  LORazepam   Injectable 2 milliGRAM(s) IntraMuscular once PRN Agitation  melatonin 3 milliGRAM(s) Oral at bedtime PRN Insomnia  ondansetron Injectable 4 milliGRAM(s) IV Push every 8 hours PRN Nausea and/or Vomiting  sodium chloride 0.65% Nasal 1 Spray(s) Both Nostrils daily PRN Nasal Congestion    Vital Signs Last 24 Hrs  T(F): 98.7 (09 Nov 2024 19:46), Max: 98.7 (09 Nov 2024 19:46)  HR: 102 (09 Nov 2024 19:46) (102 - 102)  BP: 110/75 (09 Nov 2024 19:46) (110/75 - 110/75)  RR: 16 (09 Nov 2024 19:46) (16 - 16)  SpO2: 93% (09 Nov 2024 19:46) (93% - 93%)  I&O's Summary    PHYSICAL EXAM:  General: NAD, sleepy, but arousable.   ENT: No gross hearing impairment, Moist mucous membranes, no thrush  Neck: Supple, No JVD  Lungs: Clear to auscultation bilaterally, good air entry, non-labored breathing  Cardio: RRR, S1/S2, No murmur  Abdomen: Soft, Nontender, Nondistended; Bowel sounds present  Extremities: No calf tenderness, No cyanosis, No pitting edema  Psych: mood calm    LABS:                        16.7   7.19  )-----------( 217      ( 08 Nov 2024 08:41 )             47.2     11-08    140  |  107  |  12  ----------------------------<  97  4.2   |  25  |  1.09    Ca    8.9      08 Nov 2024 08:41    TPro  6.5  /  Alb  3.9  /  TBili  1.0  /  DBili  x   /  AST  21  /  ALT  32  /  AlkPhos  96  11-08                                      Urinalysis Basic - ( 08 Nov 2024 08:41 )    Color: x / Appearance: x / SG: x / pH: x  Gluc: 97 mg/dL / Ketone: x  / Bili: x / Urobili: x   Blood: x / Protein: x / Nitrite: x   Leuk Esterase: x / RBC: x / WBC x   Sq Epi: x / Non Sq Epi: x / Bacteria: x        COVID-19 PCR: NotDetec (10-20-24 @ 15:10)    RADIOLOGY & ADDITIONAL TESTS:    Care Discussed with Consultants/Other Providers:    Patient is a 19y old  Male who presents with a chief complaint of Placement (09 Nov 2024 12:04)      Patient seen and examined at bedside. No overnight events reported.     ALLERGIES:  No Known Allergies    MEDICATIONS  (STANDING):  desvenlafaxine ER 50 milliGRAM(s) Oral daily  lithium SR (LITHOBID) 600 milliGRAM(s) Oral daily  lithium SR (LITHOBID) 300 milliGRAM(s) Oral at bedtime  QUEtiapine 300 milliGRAM(s) Oral two times a day    MEDICATIONS  (PRN):  acetaminophen     Tablet .. 650 milliGRAM(s) Oral every 6 hours PRN Temp greater or equal to 38C (100.4F), Mild Pain (1 - 3)  aluminum hydroxide/magnesium hydroxide/simethicone Suspension 30 milliLiter(s) Oral every 4 hours PRN Dyspepsia  LORazepam     Tablet 2 milliGRAM(s) Oral daily PRN Agitation  LORazepam   Injectable 2 milliGRAM(s) IntraMuscular once PRN Agitation  melatonin 3 milliGRAM(s) Oral at bedtime PRN Insomnia  ondansetron Injectable 4 milliGRAM(s) IV Push every 8 hours PRN Nausea and/or Vomiting  sodium chloride 0.65% Nasal 1 Spray(s) Both Nostrils daily PRN Nasal Congestion    Vital Signs Last 24 Hrs  T(F): 98.7 (09 Nov 2024 19:46), Max: 98.7 (09 Nov 2024 19:46)  HR: 102 (09 Nov 2024 19:46) (102 - 102)  BP: 110/75 (09 Nov 2024 19:46) (110/75 - 110/75)  RR: 16 (09 Nov 2024 19:46) (16 - 16)  SpO2: 93% (09 Nov 2024 19:46) (93% - 93%)  I&O's Summary    PHYSICAL EXAM:  General: NAD, sleepy, but arousable.   ENT: No gross hearing impairment, Moist mucous membranes, no thrush  Neck: Supple, No JVD  Lungs: Clear to auscultation bilaterally, good air entry, non-labored breathing  Cardio: RRR, S1/S2, No murmur  Abdomen: Soft, Nontender, Nondistended; Bowel sounds present  Extremities: No calf tenderness, No cyanosis, No pitting edema  Psych: mood calm, depressed    LABS:                        16.7   7.19  )-----------( 217      ( 08 Nov 2024 08:41 )             47.2     11-08    140  |  107  |  12  ----------------------------<  97  4.2   |  25  |  1.09    Ca    8.9      08 Nov 2024 08:41    TPro  6.5  /  Alb  3.9  /  TBili  1.0  /  DBili  x   /  AST  21  /  ALT  32  /  AlkPhos  96  11-08                                      Urinalysis Basic - ( 08 Nov 2024 08:41 )    Color: x / Appearance: x / SG: x / pH: x  Gluc: 97 mg/dL / Ketone: x  / Bili: x / Urobili: x   Blood: x / Protein: x / Nitrite: x   Leuk Esterase: x / RBC: x / WBC x   Sq Epi: x / Non Sq Epi: x / Bacteria: x        COVID-19 PCR: NotDetec (10-20-24 @ 15:10)    RADIOLOGY & ADDITIONAL TESTS:    Care Discussed with Consultants/Other Providers:

## 2024-11-11 PROCEDURE — 99232 SBSQ HOSP IP/OBS MODERATE 35: CPT

## 2024-11-11 RX ADMIN — Medication 300 MILLIGRAM(S): at 06:21

## 2024-11-11 RX ADMIN — LITHIUM CARBONATE 300 MILLIGRAM(S): 300 CAPSULE ORAL at 21:42

## 2024-11-11 RX ADMIN — Medication 300 MILLIGRAM(S): at 18:10

## 2024-11-11 RX ADMIN — DESVENLAFAXINE SUCCINATE 50 MILLIGRAM(S): 50 TABLET, EXTENDED RELEASE ORAL at 12:41

## 2024-11-11 RX ADMIN — LITHIUM CARBONATE 600 MILLIGRAM(S): 300 CAPSULE ORAL at 12:41

## 2024-11-11 NOTE — BH CONSULTATION LIAISON PROGRESS NOTE - NSBHTIMEACTIVITIESPERFORMED_PSY_A_CORE
collaboration with treatment team regarding level of observation, clinical update /phone conversation with his mother, face to face interview.

## 2024-11-11 NOTE — BH CONSULTATION LIAISON PROGRESS NOTE - NSBHCONSULTPRIMARYDISCUSSYES_PSY_A_CORE FT
as per above. writer also spoke with patient's mother to give a clinical update as to the plan of care, med adjustments and his response to treatment thus far.

## 2024-11-11 NOTE — BH CONSULTATION LIAISON PROGRESS NOTE - NSBHASSESSMENTFT_PSY_ALL_CORE
20yo male withPMH of autism, ADHD, history of episodes of agitation and aggression brought in by EMS from home for agitation.  Pt seen by telepsychiatry and deemed not meeting criteria for acute inpatient care.   Plan is to procure emergency housing through [facility], PPD placed and results sent.  Pt had phone interview which prompted increased acting out , the majority of his stay on the medical floor had been uneventful.   Pt started to head bang and briefly voiced suicidal ideation and feelings that he did not deserve happiness, beginning to gain insight into   the consequences of aggressive behaviors at home but also seems to be processing his diagnosis of Autism spectrum disorder and the impact this  has had on his relationships with others and himself.

## 2024-11-11 NOTE — PROGRESS NOTE ADULT - SUBJECTIVE AND OBJECTIVE BOX
Patient is a 19y old  Male who presents with a chief complaint of Placement (10 Nov 2024 08:06)      Patient seen and examined at bedside. No overnight events reported. Patient states he feels good and denies any complaints.     ALLERGIES:  No Known Allergies    MEDICATIONS  (STANDING):  desvenlafaxine ER 50 milliGRAM(s) Oral daily  lithium SR (LITHOBID) 600 milliGRAM(s) Oral daily  lithium SR (LITHOBID) 300 milliGRAM(s) Oral at bedtime  QUEtiapine 300 milliGRAM(s) Oral two times a day    MEDICATIONS  (PRN):  acetaminophen     Tablet .. 650 milliGRAM(s) Oral every 6 hours PRN Temp greater or equal to 38C (100.4F), Mild Pain (1 - 3)  aluminum hydroxide/magnesium hydroxide/simethicone Suspension 30 milliLiter(s) Oral every 4 hours PRN Dyspepsia  LORazepam     Tablet 2 milliGRAM(s) Oral daily PRN Agitation  LORazepam   Injectable 2 milliGRAM(s) IntraMuscular once PRN Agitation  melatonin 3 milliGRAM(s) Oral at bedtime PRN Insomnia  ondansetron Injectable 4 milliGRAM(s) IV Push every 8 hours PRN Nausea and/or Vomiting  sodium chloride 0.65% Nasal 1 Spray(s) Both Nostrils daily PRN Nasal Congestion    Vital Signs Last 24 Hrs  T(F): 97.4 (11 Nov 2024 05:08), Max: 97.9 (10 Nov 2024 12:06)  HR: 76 (11 Nov 2024 05:08) (76 - 96)  BP: 115/70 (11 Nov 2024 05:08) (110/71 - 128/81)  RR: 16 (11 Nov 2024 05:08) (16 - 16)  SpO2: 96% (11 Nov 2024 05:08) (94% - 96%)  I&O's Summary    PHYSICAL EXAM:  General: NAD, A/O x 3, calm, pleasant  ENT: No gross hearing impairment, Moist mucous membranes, no thrush  Neck: Supple, No JVD  Lungs: Clear to auscultation bilaterally, good air entry, non-labored breathing  Cardio: RRR, S1/S2, No murmur  Abdomen: Soft, Nontender, Nondistended; Bowel sounds present  Extremities: No calf tenderness, No cyanosis, No pitting edema    LABS:                                                COVID-19 PCR: NotDetec (10-20-24 @ 15:10)    RADIOLOGY & ADDITIONAL TESTS:    Care Discussed with Consultants/Other Providers:    Patient is a 19y old  Male who presents with a chief complaint of Placement (10 Nov 2024 08:06)      Patient seen and examined at bedside. No overnight events reported. Pt noted to receive Ativan PO x1 as PRN. Patient states he feels good and denies any complaints.     ALLERGIES:  No Known Allergies    MEDICATIONS  (STANDING):  desvenlafaxine ER 50 milliGRAM(s) Oral daily  lithium SR (LITHOBID) 600 milliGRAM(s) Oral daily  lithium SR (LITHOBID) 300 milliGRAM(s) Oral at bedtime  QUEtiapine 300 milliGRAM(s) Oral two times a day    MEDICATIONS  (PRN):  acetaminophen     Tablet .. 650 milliGRAM(s) Oral every 6 hours PRN Temp greater or equal to 38C (100.4F), Mild Pain (1 - 3)  aluminum hydroxide/magnesium hydroxide/simethicone Suspension 30 milliLiter(s) Oral every 4 hours PRN Dyspepsia  LORazepam     Tablet 2 milliGRAM(s) Oral daily PRN Agitation  LORazepam   Injectable 2 milliGRAM(s) IntraMuscular once PRN Agitation  melatonin 3 milliGRAM(s) Oral at bedtime PRN Insomnia  ondansetron Injectable 4 milliGRAM(s) IV Push every 8 hours PRN Nausea and/or Vomiting  sodium chloride 0.65% Nasal 1 Spray(s) Both Nostrils daily PRN Nasal Congestion    Vital Signs Last 24 Hrs  T(F): 97.4 (11 Nov 2024 05:08), Max: 97.9 (10 Nov 2024 12:06)  HR: 76 (11 Nov 2024 05:08) (76 - 96)  BP: 115/70 (11 Nov 2024 05:08) (110/71 - 128/81)  RR: 16 (11 Nov 2024 05:08) (16 - 16)  SpO2: 96% (11 Nov 2024 05:08) (94% - 96%)  I&O's Summary    PHYSICAL EXAM:  General: NAD, A/O x 3, calm, pleasant  ENT: No gross hearing impairment, Moist mucous membranes, no thrush  Neck: Supple, No JVD  Lungs: Clear to auscultation bilaterally, good air entry, non-labored breathing  Cardio: RRR, S1/S2, No murmur  Abdomen: Soft, Nontender, Nondistended; Bowel sounds present  Extremities: No calf tenderness, No cyanosis, No pitting edema    LABS:                                                COVID-19 PCR: NotDetec (10-20-24 @ 15:10)    RADIOLOGY & ADDITIONAL TESTS:    Care Discussed with Consultants/Other Providers:

## 2024-11-11 NOTE — BH CONSULTATION LIAISON PROGRESS NOTE - NSBHCONSULTRECOMMENDOTHER_PSY_A_CORE FT
Repeat Lithium level 11/14/2024.   Continue to pursue housing.   Agree with add on of recreation /creative arts therapy, pt in particular likes sports and reads well.  Would set limits in terms of expectation of behavior and if escalating offer prn early.     If no events overnight, downgrade to routine observation.

## 2024-11-11 NOTE — PROGRESS NOTE ADULT - ASSESSMENT
18yo male with PMH of autism, ADHD, history of episodes of agitation and aggression brought in by EMS from home for agitation.    *Agitation with hx of ADHD, autism  - pt with episodes of agitation, cont 1:1  - Psychiatry recs appreciated, also rec is for 1:1 at this time for SI , check lithium level 11/12 or 11/13   - Medications adjusted, now on Lithium 600 mg daily, Lithium 300mg qhs, Pristiq ER 50 mg daily and Seroquel 300mg BID; Ativan 2mg PO PRN agitation  - arrangements are being made for discharge, not returning to home, requires housing placement   - Appreciate Social Work involvement   - Recreational therapy   - per mom: patient was not aware of autism diagnosis, patient is now aware at this time  - CT head reviewed s/p 11/7 incident where pt hit his head after intake meeting      *DVT ppx  OOB, ambulation    GOC/Code Status: Full code    Dispo: Pending emergency housing placement. Intake meeting was 11/7.  Per SW will have more housing information tomorrow, we will update his Mother.  18yo male with PMH of autism, ADHD, history of episodes of agitation and aggression brought in by EMS from home for agitation.    *Agitation with hx of ADHD, autism  - pt with episodes of agitation, cont 1:1  - Psychiatry recs appreciated, also rec is for 1:1 at this time for SI , check lithium level 11/12 or 11/13   - Medications adjusted, now on Lithium 600 mg daily, Lithium 300mg qhs, Pristiq ER 50 mg daily and Seroquel 300mg BID; Ativan 2mg PO PRN agitation  - arrangements are being made for discharge, not returning to home, requires housing placement   - Appreciate Social Work involvement   - Recreational therapy ordered  - per mom: patient was not aware of autism diagnosis, patient is now aware at this time  - CT head reviewed s/p 11/7 incident where pt hit his head after intake meeting      *DVT ppx  OOB, ambulation    GOC/Code Status: Full code    Dispo: Pending emergency housing placement. Intake meeting was 11/7.  Per SW will have more housing information tomorrow, we will update his Mother.

## 2024-11-12 PROCEDURE — 99232 SBSQ HOSP IP/OBS MODERATE 35: CPT

## 2024-11-12 RX ADMIN — Medication 300 MILLIGRAM(S): at 06:43

## 2024-11-12 RX ADMIN — LITHIUM CARBONATE 300 MILLIGRAM(S): 300 CAPSULE ORAL at 21:48

## 2024-11-12 RX ADMIN — DESVENLAFAXINE SUCCINATE 50 MILLIGRAM(S): 50 TABLET, EXTENDED RELEASE ORAL at 14:36

## 2024-11-12 RX ADMIN — LITHIUM CARBONATE 600 MILLIGRAM(S): 300 CAPSULE ORAL at 14:36

## 2024-11-12 RX ADMIN — Medication 300 MILLIGRAM(S): at 17:25

## 2024-11-12 NOTE — PROGRESS NOTE ADULT - ASSESSMENT
20yo male with PMH of autism, ADHD, history of episodes of agitation and aggression brought in by EMS from home for agitation.    *Agitation with hx of ADHD, autism  - pt with episodes of agitation, patient denies self-harm or SI, patient has been calm, will downgrade to routine observation  - Psychiatry recs appreciated, check lithium level 11/14  - Medications adjusted, now on Lithium 600 mg daily, Lithium 300mg qhs, Pristiq ER 50 mg daily and Seroquel 300mg BID; Ativan 2mg PO PRN agitation  - arrangements are being made for discharge, not returning to home, requires housing placement   - Appreciate Social Work involvement   - Recreational therapy ordered  - CT head reviewed s/p 11/7 incident where pt hit his head after intake meeting      *DVT ppx  OOB, ambulation    GOC/Code Status: Full code    Dispo: Pending emergency housing placement. Intake meeting was 11/7.  Per SW will have more housing information tomorrow, we will update his Mother.  18yo male with PMH of autism, ADHD, history of episodes of agitation and aggression brought in by EMS from home for agitation.    *Agitation with hx of ADHD, autism  - pt with episodes of agitation, patient denies self-harm or SI, patient has been calm, will downgrade to routine observation  - Psychiatry recs appreciated, check lithium level 11/14  - Medications adjusted, now on Lithium 600 mg daily, Lithium 300mg qhs, Pristiq ER 50 mg daily and Seroquel 300mg BID; Ativan 2mg PO PRN agitation  - arrangements are being made for discharge, not returning to home, requires housing placement   - Appreciate Social Work involvement   - Recreational therapy ordered and done in pt room  - CT head reviewed s/p 11/7 incident where pt hit his head after intake meeting      *DVT ppx  OOB, ambulation    GOC/Code Status: Full code    Dispo: Pending emergency housing placement. Intake meeting was 11/7.  Per SW will have more housing information tomorrow, we will update his Mother.

## 2024-11-12 NOTE — PROGRESS NOTE ADULT - SUBJECTIVE AND OBJECTIVE BOX
Patient is a 19y old  Male who presents with a chief complaint of Placement (11 Nov 2024 09:22)      Patient seen and examined at bedside. No overnight events reported. Patient states he is feeling well. Patient denies current SI or self-hate, pain, sob, headache, nausea, vomiting.     ALLERGIES:  No Known Allergies    MEDICATIONS  (STANDING):  desvenlafaxine ER 50 milliGRAM(s) Oral daily  lithium SR (LITHOBID) 600 milliGRAM(s) Oral daily  lithium SR (LITHOBID) 300 milliGRAM(s) Oral at bedtime  QUEtiapine 300 milliGRAM(s) Oral two times a day    MEDICATIONS  (PRN):  acetaminophen     Tablet .. 650 milliGRAM(s) Oral every 6 hours PRN Temp greater or equal to 38C (100.4F), Mild Pain (1 - 3)  aluminum hydroxide/magnesium hydroxide/simethicone Suspension 30 milliLiter(s) Oral every 4 hours PRN Dyspepsia  LORazepam     Tablet 2 milliGRAM(s) Oral daily PRN Agitation  LORazepam   Injectable 2 milliGRAM(s) IntraMuscular once PRN Agitation  melatonin 3 milliGRAM(s) Oral at bedtime PRN Insomnia  ondansetron Injectable 4 milliGRAM(s) IV Push every 8 hours PRN Nausea and/or Vomiting  sodium chloride 0.65% Nasal 1 Spray(s) Both Nostrils daily PRN Nasal Congestion    Vital Signs Last 24 Hrs  T(F): 96.4 (11 Nov 2024 19:25), Max: 97.6 (11 Nov 2024 12:56)  HR: 91 (11 Nov 2024 19:25) (91 - 102)  BP: 126/76 (11 Nov 2024 19:25) (121/81 - 126/76)  RR: 17 (11 Nov 2024 19:25) (16 - 17)  SpO2: 92% (11 Nov 2024 19:25) (92% - 92%)  I&O's Summary    PHYSICAL EXAM:  General: NAD, A/O x 3  ENT: No gross hearing impairment, Moist mucous membranes, no thrush  Neck: Supple, No JVD  Lungs: Clear to auscultation bilaterally, good air entry, non-labored breathing  Cardio: RRR, S1/S2, No murmur  Abdomen: Soft, Nontender, Nondistended; Bowel sounds present  Extremities: No calf tenderness, No cyanosis, No pitting edema  Psych: Appropriate mood and affect    LABS:                                                COVID-19 PCR: NotDetec (10-20-24 @ 15:10)    RADIOLOGY & ADDITIONAL TESTS:    Care Discussed with Consultants/Other Providers:

## 2024-11-12 NOTE — PROGRESS NOTE ADULT - NS ATTEND AMEND GEN_ALL_CORE FT
spoke with pt at bedside who denies any acute complaints. pt stable for d/c medically, awaiting placement with SW

## 2024-11-13 PROCEDURE — 99232 SBSQ HOSP IP/OBS MODERATE 35: CPT

## 2024-11-13 RX ADMIN — LITHIUM CARBONATE 600 MILLIGRAM(S): 300 CAPSULE ORAL at 12:23

## 2024-11-13 RX ADMIN — Medication 300 MILLIGRAM(S): at 06:09

## 2024-11-13 RX ADMIN — DESVENLAFAXINE SUCCINATE 50 MILLIGRAM(S): 50 TABLET, EXTENDED RELEASE ORAL at 12:23

## 2024-11-13 RX ADMIN — LITHIUM CARBONATE 300 MILLIGRAM(S): 300 CAPSULE ORAL at 21:24

## 2024-11-13 RX ADMIN — Medication 300 MILLIGRAM(S): at 17:02

## 2024-11-13 NOTE — PROGRESS NOTE ADULT - ASSESSMENT
20yo male with PMH of autism, ADHD, history of episodes of agitation and aggression brought in by EMS from home for agitation.    *Agitation with hx of ADHD, autism  - pt with episodes of agitation, patient denies self-harm or SI, patient has been calm, downgraded to routine observation  - Psychiatry recs appreciated, check lithium level 11/14  - Medications adjusted, now on Lithium 600 mg daily, Lithium 300mg qhs, Pristiq ER 50 mg daily and Seroquel 300mg BID; Ativan 2mg PO PRN agitation  - arrangements are being made for discharge, not returning to home, requires housing placement   - Appreciate Social Work involvement   - Recreational therapy ordered and done in pt room  - CT head reviewed s/p 11/7 incident where pt hit his head after intake meeting      *DVT ppx  OOB, ambulation    GOC/Code Status: Full code    Dispo: Pending emergency housing placement. Intake meeting was 11/7.  Per SW will have more housing information tomorrow, we will update his Mother.  20yo male with PMH of autism, ADHD, history of episodes of agitation and aggression brought in by EMS from home for agitation.    *Agitation with hx of ADHD, autism  - pt with episodes of agitation, patient denies self-harm or SI, patient has been calm, downgraded to routine observation  - Psychiatry recs appreciated, check lithium level 11/14  - Medications adjusted, now on Lithium 600 mg daily, Lithium 300mg qhs, Pristiq ER 50 mg daily and Seroquel 300mg BID; Ativan 2mg PO PRN agitation  - arrangements are being made for discharge, not returning to home, requires housing placement   - Appreciate Social Work involvement   - Recreational therapy ordered and done in pt room  - CT head reviewed s/p 11/7 incident where pt hit his head after intake meeting      *DVT ppx  OOB, ambulation    GOC/Code Status: Full code    Dispo: Accepted to Crisis Housing in [facility]. Intake meeting was 11/7.  Per SW, awaiting bed availability

## 2024-11-13 NOTE — PROGRESS NOTE ADULT - NS ATTEND OPT1 GEN_ALL_CORE
I attest my time as attending is greater than 50% of the total combined time spent on qualifying patient care activities by the PA/NP and attending.
I independently performed the documented:
I attest my time as attending is greater than 50% of the total combined time spent on qualifying patient care activities by the PA/NP and attending.
I attest my time as attending is greater than 50% of the total combined time spent on qualifying patient care activities by the PA/NP and attending.
I independently performed the documented:
I attest my time as attending is greater than 50% of the total combined time spent on qualifying patient care activities by the PA/NP and attending.
I independently performed the documented:
I attest my time as attending is greater than 50% of the total combined time spent on qualifying patient care activities by the PA/NP and attending.
I independently performed the documented:
I independently performed the documented:
I attest my time as attending is greater than 50% of the total combined time spent on qualifying patient care activities by the PA/NP and attending.

## 2024-11-13 NOTE — PROGRESS NOTE ADULT - NS ATTEND AMEND GEN_ALL_CORE FT
pt medically stable for d/c. Accepted to Crisis Housing in [facility]. Per SW, awaiting bed availability

## 2024-11-13 NOTE — PROGRESS NOTE ADULT - SUBJECTIVE AND OBJECTIVE BOX
Patient is a 19y old  Male who presents with a chief complaint of Placement (12 Nov 2024 09:11)      Patient seen and examined at bedside. No overnight events reported. Patient states he is feeling good. Denies SI, self-harm, headache, sob, chest pain.      ALLERGIES:  No Known Allergies    MEDICATIONS  (STANDING):  desvenlafaxine ER 50 milliGRAM(s) Oral daily  lithium SR (LITHOBID) 300 milliGRAM(s) Oral at bedtime  lithium SR (LITHOBID) 600 milliGRAM(s) Oral daily  QUEtiapine 300 milliGRAM(s) Oral two times a day    MEDICATIONS  (PRN):  acetaminophen     Tablet .. 650 milliGRAM(s) Oral every 6 hours PRN Temp greater or equal to 38C (100.4F), Mild Pain (1 - 3)  aluminum hydroxide/magnesium hydroxide/simethicone Suspension 30 milliLiter(s) Oral every 4 hours PRN Dyspepsia  LORazepam     Tablet 2 milliGRAM(s) Oral daily PRN Agitation  LORazepam   Injectable 2 milliGRAM(s) IntraMuscular once PRN Agitation  melatonin 3 milliGRAM(s) Oral at bedtime PRN Insomnia  ondansetron Injectable 4 milliGRAM(s) IV Push every 8 hours PRN Nausea and/or Vomiting  sodium chloride 0.65% Nasal 1 Spray(s) Both Nostrils daily PRN Nasal Congestion    Vital Signs Last 24 Hrs  T(F): 97.9 (13 Nov 2024 12:37), Max: 98.6 (12 Nov 2024 19:08)  HR: 127 (13 Nov 2024 12:37) (93 - 127)  BP: 124/88 (13 Nov 2024 12:37) (112/72 - 144/89)  RR: 16 (13 Nov 2024 12:37) (16 - 17)  SpO2: 96% (13 Nov 2024 12:37) (94% - 96%)  I&O's Summary    12 Nov 2024 07:01  -  13 Nov 2024 07:00  --------------------------------------------------------  IN: 1340 mL / OUT: 0 mL / NET: 1340 mL    13 Nov 2024 07:01  -  13 Nov 2024 13:18  --------------------------------------------------------  IN: 720 mL / OUT: 0 mL / NET: 720 mL      PHYSICAL EXAM:  General: NAD, A/O x 3  ENT: No gross hearing impairment, Moist mucous membranes, no thrush  Neck: Supple, No JVD  Lungs: Clear to auscultation bilaterally, good air entry, non-labored breathing  Cardio: RRR, S1/S2, No murmur  Abdomen: Soft, Nontender, Nondistended; Bowel sounds present  Extremities: No calf tenderness, No cyanosis, No pitting edema    LABS:                                                COVID-19 PCR: NotDetec (10-20-24 @ 15:10)    RADIOLOGY & ADDITIONAL TESTS:    Care Discussed with Consultants/Other Providers:

## 2024-11-14 LAB — LITHIUM SERPL-MCNC: 0.53 MMOL/L — LOW (ref 0.6–1.2)

## 2024-11-14 PROCEDURE — 99232 SBSQ HOSP IP/OBS MODERATE 35: CPT

## 2024-11-14 RX ADMIN — LITHIUM CARBONATE 300 MILLIGRAM(S): 300 CAPSULE ORAL at 21:06

## 2024-11-14 RX ADMIN — LITHIUM CARBONATE 600 MILLIGRAM(S): 300 CAPSULE ORAL at 11:45

## 2024-11-14 RX ADMIN — Medication 300 MILLIGRAM(S): at 05:01

## 2024-11-14 RX ADMIN — Medication 300 MILLIGRAM(S): at 17:53

## 2024-11-14 RX ADMIN — DESVENLAFAXINE SUCCINATE 50 MILLIGRAM(S): 50 TABLET, EXTENDED RELEASE ORAL at 11:45

## 2024-11-14 NOTE — PROGRESS NOTE ADULT - ASSESSMENT
18yo male with PMH of autism, ADHD, history of episodes of agitation and aggression brought in by EMS from home for agitation.    *Agitation with hx of ADHD, autism  - pt with episodes of agitation, patient denies self-harm or SI, patient has been calm, downgraded to routine observation  - Psychiatry recs appreciated, lithium level 11/14 0.53  - Continue Lithium 600 mg daily, Lithium 300mg qhs, further adjustment dose per psych  - continue Pristiq ER 50 mg daily and Seroquel 300mg BID; Ativan 2mg PO PRN agitation  - arrangements are being made for discharge, not returning to home, requires housing placement   - Appreciate Social Work involvement   - Recreational therapy ordered and done in pt room  - CT head 11/7 negative after an  incident where pt hit his head after intake meeting      *DVT ppx  OOB, ambulation    GOC/Code Status: Full code    Dispo: Accepted to Crisis Housing in [facility]. Intake meeting was 11/7.  Per SW, awaiting bed availability

## 2024-11-14 NOTE — PROGRESS NOTE ADULT - SUBJECTIVE AND OBJECTIVE BOX
CC: Patient is a 19y old  Male who presents with a chief complaint of Placement (13 Nov 2024 13:18)      Interval History:  Patient seen and examined at bedside.  no events overnight    ALLERGIES:  No Known Allergies    MEDICATIONS  (STANDING):  desvenlafaxine ER 50 milliGRAM(s) Oral daily  lithium SR (LITHOBID) 600 milliGRAM(s) Oral daily  lithium SR (LITHOBID) 300 milliGRAM(s) Oral at bedtime  QUEtiapine 300 milliGRAM(s) Oral two times a day    MEDICATIONS  (PRN):  acetaminophen     Tablet .. 650 milliGRAM(s) Oral every 6 hours PRN Temp greater or equal to 38C (100.4F), Mild Pain (1 - 3)  aluminum hydroxide/magnesium hydroxide/simethicone Suspension 30 milliLiter(s) Oral every 4 hours PRN Dyspepsia  LORazepam     Tablet 2 milliGRAM(s) Oral daily PRN Agitation  LORazepam   Injectable 2 milliGRAM(s) IntraMuscular once PRN Agitation  melatonin 3 milliGRAM(s) Oral at bedtime PRN Insomnia  ondansetron Injectable 4 milliGRAM(s) IV Push every 8 hours PRN Nausea and/or Vomiting  sodium chloride 0.65% Nasal 1 Spray(s) Both Nostrils daily PRN Nasal Congestion    Vital Signs Last 24 Hrs  T(F): 98.5 (14 Nov 2024 12:54), Max: 98.7 (13 Nov 2024 19:14)  HR: 120 (14 Nov 2024 12:54) (72 - 120)  BP: 136/84 (14 Nov 2024 12:54) (112/62 - 136/84)  RR: 17 (14 Nov 2024 12:54) (16 - 17)  SpO2: 98% (14 Nov 2024 12:54) (95% - 98%)  I&O's Summary    13 Nov 2024 07:01  -  14 Nov 2024 07:00  --------------------------------------------------------  IN: 1680 mL / OUT: 0 mL / NET: 1680 mL    14 Nov 2024 07:01  -  14 Nov 2024 13:01  --------------------------------------------------------  IN: 720 mL / OUT: 0 mL / NET: 720 mL          PHYSICAL EXAM:  GENERAL: NAD  NERVOUS SYSTEM:  CN II - XII intact; Sensation intact; follows commands  CHEST/LUNG: Clear to percussion bilaterally; No rales, rhonchi, wheezing, or rubs; normal respiratory effort, no intercostal retractions  HEART: Regular rate and rhythm; No murmurs, rubs, or gallops; No pitting edema  ABDOMEN: Soft, Nontender, Nondistended; Bowel sounds present; No HSM or masses  MUSCULOSKELETAL/EXTREMITIES:  2+ Peripheral Pulses, No clubbing or digital cyanosis; FROM of extremeties (pain, crepitation or contracture)  PSYCH: Appropriate affect, Alert & Oriented x 3,    LABS:                                              COVID-19 PCR: NotDetec (10-20-24 @ 15:10)      Care Discussed with Consultants/Other Providers: Yes

## 2024-11-14 NOTE — CHART NOTE - NSCHARTNOTEFT_GEN_A_CORE
NUTRITION Follow Up Note    SOURCE: Patient [X]  Medical Record [X]  Nursing Staff [X]  Family Member []    DIET: Diet, Regular (10-21-24 @ 12:13) [Active]    Patient noted with good appetite, consuming >75% of meals per nursing flowsheets. No issues with chewing/swallowing. Food preferences obtained and noted on patients file with nutrition office. Electrolytes stable at this time.     EDEMA: no edema noted    LAST BM: 12-Nov-2024    SKIN: no pressure injuries    WEIGHT TRENDS: 93 kG (10/20/24)      PERTINENT MEDICATIONS: MEDICATIONS  (STANDING):  desvenlafaxine ER 50 milliGRAM(s) Oral daily  lithium SR (LITHOBID) 600 milliGRAM(s) Oral daily  lithium SR (LITHOBID) 300 milliGRAM(s) Oral at bedtime  QUEtiapine 300 milliGRAM(s) Oral two times a day    MEDICATIONS  (PRN):  acetaminophen     Tablet .. 650 milliGRAM(s) Oral every 6 hours PRN Temp greater or equal to 38C (100.4F), Mild Pain (1 - 3)  aluminum hydroxide/magnesium hydroxide/simethicone Suspension 30 milliLiter(s) Oral every 4 hours PRN Dyspepsia  LORazepam     Tablet 2 milliGRAM(s) Oral daily PRN Agitation  LORazepam   Injectable 2 milliGRAM(s) IntraMuscular once PRN Agitation  melatonin 3 milliGRAM(s) Oral at bedtime PRN Insomnia  ondansetron Injectable 4 milliGRAM(s) IV Push every 8 hours PRN Nausea and/or Vomiting  sodium chloride 0.65% Nasal 1 Spray(s) Both Nostrils daily PRN Nasal Congestion      PERTINENT LABS:   11-08 Alb 3.9 g/dL        ESTIMATED NEEDS:   [X] No Change Since Previous Assessment    PREVIOUS NUTRITION DIAGNOSIS:  No active nutrition Dx at this present time    NUTRITION DIAGNOSIS IS [X] Ongoing     NEW NUTRITION DIAGNOSIS: [X] Not Applicable    INTERVENTIONS:   1. Recommend continue current nutrition plan of care as tolerated     MONITORING & EVALUATION:   1. Weights   2. PO intakes   3. Skin integrity   4. Tolerance to diet prescription   5. Labs & POCT  6. Follow up (per protocol)    Registered Dietitian/Nutritionist Remains Available.  Fermin Mensah RD, CDN    Contact: [phone] or via MS TEAMS

## 2024-11-15 PROCEDURE — 99232 SBSQ HOSP IP/OBS MODERATE 35: CPT

## 2024-11-15 RX ADMIN — Medication 300 MILLIGRAM(S): at 05:04

## 2024-11-15 RX ADMIN — LITHIUM CARBONATE 600 MILLIGRAM(S): 300 CAPSULE ORAL at 13:39

## 2024-11-15 RX ADMIN — LITHIUM CARBONATE 300 MILLIGRAM(S): 300 CAPSULE ORAL at 21:03

## 2024-11-15 RX ADMIN — DESVENLAFAXINE SUCCINATE 50 MILLIGRAM(S): 50 TABLET, EXTENDED RELEASE ORAL at 13:39

## 2024-11-15 RX ADMIN — Medication 300 MILLIGRAM(S): at 17:22

## 2024-11-15 NOTE — PROGRESS NOTE ADULT - ASSESSMENT
18yo male with PMH of autism, ADHD, history of episodes of agitation and aggression brought in by EMS from home for agitation.    *Agitation with hx of ADHD, autism  - pt with episodes of agitation, patient denies self-harm or SI, patient has been calm, downgraded to routine observation. No episodes of agitation last 48 hours  - Psychiatry recs appreciated, lithium level 11/14 0.53  - Continue Lithium 600 mg daily, Lithium 300mg qhs, further adjustment dose per psych  - continue Pristiq ER 50 mg daily and Seroquel 300mg BID; Ativan 2mg PO PRN agitation  - arrangements are being made for discharge, not returning to home, requires housing placement   - Appreciate Social Work involvement   - Recreational therapy ordered and done in pt room  - CT head 11/7 negative after an  incident where pt hit his head after intake meeting        *DVT ppx  OOB, ambulation    GOC/Code Status: Full code    Dispo: Accepted to Crisis Housing in [place] [facility]. Intake meeting was 11/7.  Per , awaiting bed availability

## 2024-11-15 NOTE — PROGRESS NOTE ADULT - SUBJECTIVE AND OBJECTIVE BOX
CC: Patient is a 19y old  Male who presents with a chief complaint of Placement (14 Nov 2024 13:01)      Interval History:  Patient seen and examined at bedside.  No overnight events  No complaints this morning  Denies CP, SOB, abd pain, dizziness, N/V/D    ALLERGIES:  No Known Allergies    MEDICATIONS  (STANDING):  desvenlafaxine ER 50 milliGRAM(s) Oral daily  lithium SR (LITHOBID) 300 milliGRAM(s) Oral at bedtime  lithium SR (LITHOBID) 600 milliGRAM(s) Oral daily  QUEtiapine 300 milliGRAM(s) Oral two times a day    MEDICATIONS  (PRN):  acetaminophen     Tablet .. 650 milliGRAM(s) Oral every 6 hours PRN Temp greater or equal to 38C (100.4F), Mild Pain (1 - 3)  aluminum hydroxide/magnesium hydroxide/simethicone Suspension 30 milliLiter(s) Oral every 4 hours PRN Dyspepsia  LORazepam     Tablet 2 milliGRAM(s) Oral daily PRN Agitation  LORazepam   Injectable 2 milliGRAM(s) IntraMuscular once PRN Agitation  melatonin 3 milliGRAM(s) Oral at bedtime PRN Insomnia  ondansetron Injectable 4 milliGRAM(s) IV Push every 8 hours PRN Nausea and/or Vomiting  sodium chloride 0.65% Nasal 1 Spray(s) Both Nostrils daily PRN Nasal Congestion    Vital Signs Last 24 Hrs  T(F): 98.1 (15 Nov 2024 05:05), Max: 98.1 (15 Nov 2024 05:05)  HR: 92 (15 Nov 2024 05:05) (59 - 92)  BP: 125/82 (15 Nov 2024 05:05) (125/82 - 136/83)  RR: 17 (15 Nov 2024 05:05) (17 - 17)  SpO2: 95% (15 Nov 2024 05:05) (95% - 97%)  I&O's Summary    14 Nov 2024 07:01  -  15 Nov 2024 07:00  --------------------------------------------------------  IN: 720 mL / OUT: 0 mL / NET: 720 mL          PHYSICAL EXAM:  GENERAL: NAD  NERVOUS SYSTEM:  CN II - XII intact; Sensation intact; follows commands  CHEST/LUNG: Clear to percussion bilaterally; No rales, rhonchi, wheezing, or rubs; normal respiratory effort, no intercostal retractions  HEART: Regular rate and rhythm; No murmurs, rubs, or gallops; No pitting edema  ABDOMEN: Soft, Nontender, Nondistended; Bowel sounds present; No HSM or masses  MUSCULOSKELETAL/EXTREMITIES:  2+ Peripheral Pulses, No clubbing or digital cyanosis; FROM of extremeties (pain, crepitation or contracture)  PSYCH: Appropriate affect, Alert & Oriented x 3,     LABS:                                              COVID-19 PCR: NotDetec (10-20-24 @ 15:10)      Care Discussed with Consultants/Other Providers: Yes

## 2024-11-16 PROCEDURE — 99232 SBSQ HOSP IP/OBS MODERATE 35: CPT

## 2024-11-16 RX ADMIN — Medication 300 MILLIGRAM(S): at 05:19

## 2024-11-16 RX ADMIN — LITHIUM CARBONATE 600 MILLIGRAM(S): 300 CAPSULE ORAL at 11:38

## 2024-11-16 RX ADMIN — DESVENLAFAXINE SUCCINATE 50 MILLIGRAM(S): 50 TABLET, EXTENDED RELEASE ORAL at 11:38

## 2024-11-16 RX ADMIN — Medication 300 MILLIGRAM(S): at 18:21

## 2024-11-16 RX ADMIN — LITHIUM CARBONATE 300 MILLIGRAM(S): 300 CAPSULE ORAL at 21:35

## 2024-11-16 RX ADMIN — ACETAMINOPHEN, DIPHENHYDRAMINE HCL, PHENYLEPHRINE HCL 3 MILLIGRAM(S): 325; 25; 5 TABLET ORAL at 21:35

## 2024-11-16 NOTE — PROGRESS NOTE ADULT - SUBJECTIVE AND OBJECTIVE BOX
CC: Patient is a 19y old  Male who presents with a chief complaint of Placement (15 Nov 2024 12:54)      Interval History:  Patient seen and examined at bedside.  No overnight events  No complaints this morning  Pt is pleasant and cooperative. Denies CP, SOB, abd pain, N/V/D    ALLERGIES:  No Known Allergies    MEDICATIONS  (STANDING):  desvenlafaxine ER 50 milliGRAM(s) Oral daily  lithium SR (LITHOBID) 300 milliGRAM(s) Oral at bedtime  lithium SR (LITHOBID) 600 milliGRAM(s) Oral daily  QUEtiapine 300 milliGRAM(s) Oral two times a day    MEDICATIONS  (PRN):  acetaminophen     Tablet .. 650 milliGRAM(s) Oral every 6 hours PRN Temp greater or equal to 38C (100.4F), Mild Pain (1 - 3)  aluminum hydroxide/magnesium hydroxide/simethicone Suspension 30 milliLiter(s) Oral every 4 hours PRN Dyspepsia  LORazepam     Tablet 2 milliGRAM(s) Oral daily PRN Agitation  melatonin 3 milliGRAM(s) Oral at bedtime PRN Insomnia  ondansetron Injectable 4 milliGRAM(s) IV Push every 8 hours PRN Nausea and/or Vomiting  sodium chloride 0.65% Nasal 1 Spray(s) Both Nostrils daily PRN Nasal Congestion    Vital Signs Last 24 Hrs  T(F): 97.1 (16 Nov 2024 04:36), Max: 98.2 (15 Nov 2024 13:42)  HR: 83 (16 Nov 2024 04:36) (83 - 125)  BP: 121/77 (16 Nov 2024 04:36) (117/67 - 142/87)  RR: 17 (16 Nov 2024 04:36) (16 - 17)  SpO2: 93% (16 Nov 2024 04:36) (93% - 94%)  I&O's Summary        PHYSICAL EXAM:  GENERAL: NAD  NERVOUS SYSTEM:  CN II - XII intact; Sensation intact; follows commands  CHEST/LUNG: Clear to percussion bilaterally; No rales, rhonchi, wheezing, or rubs; normal respiratory effort, no intercostal retractions  HEART: Regular rate and rhythm; No murmurs, rubs, or gallops; No pitting edema  ABDOMEN: Soft, Nontender, Nondistended; Bowel sounds present; No HSM or masses  MUSCULOSKELETAL/EXTREMITIES:  2+ Peripheral Pulses, No clubbing or digital cyanosis; FROM of extremeties (pain, crepitation or contracture)  PSYCH: Appropriate affect, Alert & Oriented x 3,  LABS:                                              COVID-19 PCR: NotDetec (10-20-24 @ 15:10)      Care Discussed with Consultants/Other Providers: Yes

## 2024-11-16 NOTE — PROGRESS NOTE ADULT - ASSESSMENT
20yo male with PMH of autism, ADHD, history of episodes of agitation and aggression brought in by EMS from home for agitation.    *Agitation with hx of ADHD, autism  - pt with episodes of agitation, patient denies self-harm or SI, patient has been calm, downgraded to routine observation. No episodes of agitation last 48 hours  - Psychiatry recs appreciated, lithium level 11/14 0.53  - Continue Lithium 600 mg daily, Lithium 300mg qhs, further adjustment dose per psych  - continue Pristiq ER 50 mg daily and Seroquel 300mg BID; Ativan 2mg PO PRN agitation  - arrangements are being made for discharge, not returning to home, requires housing placement   - Appreciate Social Work involvement   - Recreational therapy   - CT head 11/7 negative after an  incident where pt hit his head after intake meeting        *DVT ppx  OOB, ambulation    GOC/Code Status: Full code    Dispo: Accepted to Crisis Housing in [place] [facility]. Intake meeting was 11/7.  Per SW, awaiting bed availability

## 2024-11-17 PROCEDURE — 99232 SBSQ HOSP IP/OBS MODERATE 35: CPT

## 2024-11-17 RX ADMIN — Medication 300 MILLIGRAM(S): at 18:08

## 2024-11-17 RX ADMIN — DESVENLAFAXINE SUCCINATE 50 MILLIGRAM(S): 50 TABLET, EXTENDED RELEASE ORAL at 12:25

## 2024-11-17 RX ADMIN — LITHIUM CARBONATE 600 MILLIGRAM(S): 300 CAPSULE ORAL at 12:25

## 2024-11-17 RX ADMIN — LITHIUM CARBONATE 300 MILLIGRAM(S): 300 CAPSULE ORAL at 21:57

## 2024-11-17 RX ADMIN — Medication 300 MILLIGRAM(S): at 05:02

## 2024-11-17 NOTE — PROGRESS NOTE ADULT - SUBJECTIVE AND OBJECTIVE BOX
CC: Patient is a 19y old  Male who presents with a chief complaint of Placement (16 Nov 2024 12:31)      Interval History:  Patient seen and examined at bedside.  No overnight events  No complaints this morning  Denies CP, SOB, abd pain, N/V/D, dizziness    ALLERGIES:  No Known Allergies    MEDICATIONS  (STANDING):  desvenlafaxine ER 50 milliGRAM(s) Oral daily  lithium SR (LITHOBID) 300 milliGRAM(s) Oral at bedtime  lithium SR (LITHOBID) 600 milliGRAM(s) Oral daily  QUEtiapine 300 milliGRAM(s) Oral two times a day    MEDICATIONS  (PRN):  acetaminophen     Tablet .. 650 milliGRAM(s) Oral every 6 hours PRN Temp greater or equal to 38C (100.4F), Mild Pain (1 - 3)  aluminum hydroxide/magnesium hydroxide/simethicone Suspension 30 milliLiter(s) Oral every 4 hours PRN Dyspepsia  melatonin 3 milliGRAM(s) Oral at bedtime PRN Insomnia  ondansetron Injectable 4 milliGRAM(s) IV Push every 8 hours PRN Nausea and/or Vomiting  sodium chloride 0.65% Nasal 1 Spray(s) Both Nostrils daily PRN Nasal Congestion    Vital Signs Last 24 Hrs  T(F): 98.2 (17 Nov 2024 05:44), Max: 98.6 (16 Nov 2024 19:40)  HR: 73 (17 Nov 2024 05:44) (73 - 121)  BP: 122/79 (17 Nov 2024 05:44) (122/79 - 150/93)  RR: 16 (17 Nov 2024 05:44) (16 - 16)  SpO2: 95% (17 Nov 2024 05:44) (95% - 100%)  I&O's Summary        PHYSICAL EXAM:  GENERAL: NAD  NERVOUS SYSTEM:  CN II - XII intact; Sensation intact; follows commands  CHEST/LUNG: Clear to percussion bilaterally; No rales, rhonchi, wheezing,   HEART: Regular rate and rhythm; No murmurs, rubs, or gallops; No pitting edema  ABDOMEN: Soft, Nontender, Nondistended; Bowel sounds present; No HSM or masses  MUSCULOSKELETAL/EXTREMITIES:  2+ Peripheral Pulses, No clubbing or digital cyanosis;  PSYCH: Appropriate affect, Alert & Oriented x 3    LABS:                                              COVID-19 PCR: NotDetec (10-20-24 @ 15:10)      Care Discussed with Consultants/Other Providers: Yes

## 2024-11-17 NOTE — PROGRESS NOTE ADULT - ASSESSMENT
18yo male with PMH of autism, ADHD, history of episodes of agitation and aggression brought in by EMS from home for agitation.    *Agitation with hx of ADHD, autism  - pt with episodes of agitation, patient denies self-harm or SI, patient has been calm, downgraded to routine observation. No episodes of agitation last 48 hours  - Psychiatry recs appreciated, lithium level 11/14 0.53  - Continue Lithium 600 mg daily, Lithium 300mg qhs, further adjustment dose per psych  - continue Pristiq ER 50 mg daily and Seroquel 300mg BID; Ativan 2mg PO PRN agitation  - arrangements are being made for discharge, not returning to home, requires housing placement   - Appreciate Social Work involvement   - Recreational therapy   - CT head 11/7 negative after an  incident where pt hit his head after intake meeting        *DVT ppx  OOB, ambulation    GOC/Code Status: Full code    Dispo: Accepted to Crisis Housing in [place] [facility]. Intake meeting was 11/7.  Per SW, awaiting bed availability    Case discussed with patient's mother Skye

## 2024-11-18 PROCEDURE — 99232 SBSQ HOSP IP/OBS MODERATE 35: CPT

## 2024-11-18 RX ADMIN — LITHIUM CARBONATE 600 MILLIGRAM(S): 300 CAPSULE ORAL at 11:54

## 2024-11-18 RX ADMIN — LITHIUM CARBONATE 300 MILLIGRAM(S): 300 CAPSULE ORAL at 21:26

## 2024-11-18 RX ADMIN — Medication 300 MILLIGRAM(S): at 05:19

## 2024-11-18 RX ADMIN — DESVENLAFAXINE SUCCINATE 50 MILLIGRAM(S): 50 TABLET, EXTENDED RELEASE ORAL at 11:53

## 2024-11-18 RX ADMIN — Medication 300 MILLIGRAM(S): at 17:25

## 2024-11-18 NOTE — PROGRESS NOTE ADULT - SUBJECTIVE AND OBJECTIVE BOX
CC: Patient is a 19y old  Male who presents with a chief complaint of Placement (17 Nov 2024 12:14)      Interval History:  Patient seen and examined at bedside.  No overnight events  No complaints this morning    ALLERGIES:  No Known Allergies    MEDICATIONS  (STANDING):  desvenlafaxine ER 50 milliGRAM(s) Oral daily  lithium SR (LITHOBID) 300 milliGRAM(s) Oral at bedtime  lithium SR (LITHOBID) 600 milliGRAM(s) Oral daily  QUEtiapine 300 milliGRAM(s) Oral two times a day    MEDICATIONS  (PRN):  acetaminophen     Tablet .. 650 milliGRAM(s) Oral every 6 hours PRN Temp greater or equal to 38C (100.4F), Mild Pain (1 - 3)  aluminum hydroxide/magnesium hydroxide/simethicone Suspension 30 milliLiter(s) Oral every 4 hours PRN Dyspepsia  melatonin 3 milliGRAM(s) Oral at bedtime PRN Insomnia  ondansetron Injectable 4 milliGRAM(s) IV Push every 8 hours PRN Nausea and/or Vomiting  sodium chloride 0.65% Nasal 1 Spray(s) Both Nostrils daily PRN Nasal Congestion    Vital Signs Last 24 Hrs  T(F): 97.9 (18 Nov 2024 12:30), Max: 98.1 (18 Nov 2024 05:13)  HR: 110 (18 Nov 2024 12:30) (110 - 120)  BP: 127/81 (18 Nov 2024 12:30) (127/81 - 135/85)  RR: 17 (18 Nov 2024 12:30) (16 - 19)  SpO2: 96% (18 Nov 2024 12:30) (96% - 97%)  I&O's Summary        PHYSICAL EXAM:  GENERAL: NAD  NERVOUS SYSTEM:  CN II - XII intact; Sensation intact; follows commands  CHEST/LUNG: Clear to percussion bilaterally; No rales, rhonchi, wheezing, or rubs; normal respiratory effort, no intercostal retractions  HEART: Regular rate and rhythm; No murmurs, rubs, or gallops; No pitting edema  ABDOMEN: Soft, Nontender, Nondistended; Bowel sounds present; No HSM or masses  MUSCULOSKELETAL/EXTREMITIES:  2+ Peripheral Pulses, No clubbing or digital cyanosis; FROM of extremeties (pain, crepitation or contracture)  PSYCH: Appropriate affect, Alert & Oriented x 3,  LABS:                                              COVID-19 PCR: NotDetec (10-20-24 @ 15:10)      Care Discussed with Consultants/Other Providers: Yes

## 2024-11-18 NOTE — PROGRESS NOTE ADULT - ASSESSMENT
20yo male with PMH of autism, ADHD, history of episodes of agitation and aggression brought in by EMS from home for agitation.    *Agitation with hx of ADHD, autism  - pt with episodes of agitation, patient denies self-harm or SI, patient has been calm, downgraded to routine observation. No episodes of agitation last 48 hours  - Psychiatry recs appreciated, lithium level 11/14 0.53  - Continue Lithium 600 mg daily, Lithium 300mg qhs, further adjustment dose per psych  - continue Pristiq ER 50 mg daily and Seroquel 300mg BID; Ativan 2mg PO PRN agitation  - arrangements are being made for discharge, not returning to home, requires housing placement   - Appreciate Social Work involvement   - Recreational therapy   - CT head 11/7 negative after an  incident where pt hit his head after intake meeting        *DVT ppx  OOB, ambulation    GOC/Code Status: Full code    Dispo: [facility]. Intake meeting was 11/7.  Plan for another meeting with Crisis Housing today . Per SW, awaiting bed availability    Case discussed with patient's mother Skye

## 2024-11-19 PROCEDURE — 99232 SBSQ HOSP IP/OBS MODERATE 35: CPT

## 2024-11-19 RX ADMIN — LITHIUM CARBONATE 300 MILLIGRAM(S): 300 CAPSULE ORAL at 21:29

## 2024-11-19 RX ADMIN — Medication 300 MILLIGRAM(S): at 17:12

## 2024-11-19 RX ADMIN — Medication 300 MILLIGRAM(S): at 05:46

## 2024-11-19 RX ADMIN — DESVENLAFAXINE SUCCINATE 50 MILLIGRAM(S): 50 TABLET, EXTENDED RELEASE ORAL at 12:58

## 2024-11-19 RX ADMIN — LITHIUM CARBONATE 600 MILLIGRAM(S): 300 CAPSULE ORAL at 12:58

## 2024-11-19 NOTE — PROGRESS NOTE ADULT - SUBJECTIVE AND OBJECTIVE BOX
CC: Patient is a 19y old  Male who presents with a chief complaint of Placement (18 Nov 2024 12:43)      Interval History:  Patient seen and examined at bedside.  No overnight events  No complaints this morning    ALLERGIES:  No Known Allergies    MEDICATIONS  (STANDING):  desvenlafaxine ER 50 milliGRAM(s) Oral daily  lithium SR (LITHOBID) 300 milliGRAM(s) Oral at bedtime  lithium SR (LITHOBID) 600 milliGRAM(s) Oral daily  QUEtiapine 300 milliGRAM(s) Oral two times a day    MEDICATIONS  (PRN):  acetaminophen     Tablet .. 650 milliGRAM(s) Oral every 6 hours PRN Temp greater or equal to 38C (100.4F), Mild Pain (1 - 3)  aluminum hydroxide/magnesium hydroxide/simethicone Suspension 30 milliLiter(s) Oral every 4 hours PRN Dyspepsia  melatonin 3 milliGRAM(s) Oral at bedtime PRN Insomnia  ondansetron Injectable 4 milliGRAM(s) IV Push every 8 hours PRN Nausea and/or Vomiting  sodium chloride 0.65% Nasal 1 Spray(s) Both Nostrils daily PRN Nasal Congestion    Vital Signs Last 24 Hrs  T(F): 97.9 (19 Nov 2024 05:12), Max: 97.9 (18 Nov 2024 12:30)  HR: 134 (19 Nov 2024 05:12) (110 - 151)  BP: 135/85 (19 Nov 2024 05:12) (127/81 - 135/85)  RR: 16 (19 Nov 2024 05:12) (16 - 17)  SpO2: 96% (19 Nov 2024 05:12) (95% - 96%)  I&O's Summary        PHYSICAL EXAM:  GENERAL: NAD  NERVOUS SYSTEM:  CN II - XII intact; Sensation intact; follows commands  CHEST/LUNG: Clear to auscultation bilaterally; No rales, rhonchi, wheezing  HEART: Regular rate and rhythm; No murmurs, rubs, or gallops; No pitting edema  ABDOMEN: Soft, Nontender, Nondistended; Bowel sounds present; No HSM or masses  MUSCULOSKELETAL/EXTREMITIES:  2+ Peripheral Pulses, No clubbing or digital cyanosis;  PSYCH: Appropriate affect, Alert & Oriented x 3, Good Memory; Good insight    LABS:                                              COVID-19 PCR: NotDetec (10-20-24 @ 15:10)

## 2024-11-20 PROCEDURE — 99232 SBSQ HOSP IP/OBS MODERATE 35: CPT

## 2024-11-20 RX ADMIN — LITHIUM CARBONATE 600 MILLIGRAM(S): 300 CAPSULE ORAL at 12:18

## 2024-11-20 RX ADMIN — Medication 300 MILLIGRAM(S): at 17:20

## 2024-11-20 RX ADMIN — Medication 300 MILLIGRAM(S): at 05:52

## 2024-11-20 RX ADMIN — DESVENLAFAXINE SUCCINATE 50 MILLIGRAM(S): 50 TABLET, EXTENDED RELEASE ORAL at 12:17

## 2024-11-20 RX ADMIN — LITHIUM CARBONATE 300 MILLIGRAM(S): 300 CAPSULE ORAL at 21:03

## 2024-11-20 NOTE — PROGRESS NOTE ADULT - ASSESSMENT
20yo male with PMH of autism, ADHD, history of episodes of agitation and aggression brought in by EMS from home for agitation.    *Agitation with hx of ADHD, autism  - pt with episodes of agitation, patient denies self-harm or SI, patient has been calm, downgraded to routine observation. No episodes of agitation last 48 hours  - Psychiatry recs appreciated, lithium level 11/14 0.53  - Continue Lithium 600 mg daily, Lithium 300mg qhs, further adjustment dose per psych  - continue Pristiq ER 50 mg daily and Seroquel 300mg BID; Ativan 2mg PO PRN agitation  - arrangements are being made for discharge, not returning to home, requires housing placement   - Appreciate Social Work involvement   - Recreational therapy   - CT head 11/7 negative after an  incident where pt hit his head after intake meeting        *DVT ppx  OOB, ambulation    GOC/Code Status: Full code    Dispo: Accepted to Crisis Housing in [place] [facility]. Intake meeting was 11/7.  s/p meeting 11/19. Per SW, awaiting bed availability    Case discussed with patient's father

## 2024-11-20 NOTE — BH CONSULTATION LIAISON PROGRESS NOTE - NSBHCONSULTRECOMMENDOTHER_PSY_A_CORE FT
Repeat Lithium level = 0.53 ( low therapeutic but positive clinical effects) will defer increase in dose.   Continue to pursue housing.   Agree with add on of recreation /creative arts therapy, pt in particular likes sports and reads well.  Would set limits in terms of expectation of behavior and if escalating offer prn early.

## 2024-11-20 NOTE — PROGRESS NOTE ADULT - SUBJECTIVE AND OBJECTIVE BOX
CC: Patient is a 19y old  Male who presents with a chief complaint of Placement (19 Nov 2024 12:07)      Interval History:  Patient seen and examined at bedside.  No overnight events  No complaints this morning    ALLERGIES:  No Known Allergies    MEDICATIONS  (STANDING):  desvenlafaxine ER 50 milliGRAM(s) Oral daily  lithium SR (LITHOBID) 300 milliGRAM(s) Oral at bedtime  lithium SR (LITHOBID) 600 milliGRAM(s) Oral daily  QUEtiapine 300 milliGRAM(s) Oral two times a day    MEDICATIONS  (PRN):  acetaminophen     Tablet .. 650 milliGRAM(s) Oral every 6 hours PRN Temp greater or equal to 38C (100.4F), Mild Pain (1 - 3)  aluminum hydroxide/magnesium hydroxide/simethicone Suspension 30 milliLiter(s) Oral every 4 hours PRN Dyspepsia  melatonin 3 milliGRAM(s) Oral at bedtime PRN Insomnia  ondansetron Injectable 4 milliGRAM(s) IV Push every 8 hours PRN Nausea and/or Vomiting  sodium chloride 0.65% Nasal 1 Spray(s) Both Nostrils daily PRN Nasal Congestion    Vital Signs Last 24 Hrs  T(F): 99.9 (20 Nov 2024 12:46), Max: 99.9 (20 Nov 2024 12:46)  HR: 148 (20 Nov 2024 12:46) (132 - 148)  BP: 137/89 (20 Nov 2024 12:46) (137/89 - 146/91)  RR: 17 (20 Nov 2024 12:46) (16 - 17)  SpO2: 96% (20 Nov 2024 12:46) (94% - 96%)  I&O's Summary    19 Nov 2024 07:01  -  20 Nov 2024 07:00  --------------------------------------------------------  IN: 480 mL / OUT: 0 mL / NET: 480 mL    20 Nov 2024 07:01  -  20 Nov 2024 14:57  --------------------------------------------------------  IN: 660 mL / OUT: 0 mL / NET: 660 mL          PHYSICAL EXAM:  GENERAL: NAD  NERVOUS SYSTEM:  CN II - XII intact; Sensation intact; follows commands  CHEST/LUNG: Clear to auscultation bilaterally; No rales, rhonchi, wheezing, or rubs; normal respiratory effort, no intercostal retractions  HEART: Regular rate and rhythm; No murmurs, rubs, or gallops; No pitting edema  ABDOMEN: Soft, Nontender, Nondistended; Bowel sounds present; No HSM or masses  MUSCULOSKELETAL/EXTREMITIES:  2+ Peripheral Pulses, No clubbing or digital cyanosis; FROM of extremeties (pain, crepitation or contracture)  PSYCH: Appropriate affect, Alert & Oriented x 3,LABS:                                                  Care Discussed with Consultants/Other Providers: Yes. Case discussed with [employer] worker and patient's father in details

## 2024-11-21 PROCEDURE — 99232 SBSQ HOSP IP/OBS MODERATE 35: CPT

## 2024-11-21 RX ORDER — DESVENLAFAXINE SUCCINATE 50 MG/1
1 TABLET, EXTENDED RELEASE ORAL
Qty: 30 | Refills: 0
Start: 2024-11-21 | End: 2024-12-20

## 2024-11-21 RX ORDER — LITHIUM CARBONATE 300 MG/1
2 CAPSULE ORAL
Qty: 120 | Refills: 0
Start: 2024-11-21 | End: 2025-01-19

## 2024-11-21 RX ORDER — LORAZEPAM 2 MG/1
2 TABLET ORAL ONCE
Refills: 0 | Status: DISCONTINUED | OUTPATIENT
Start: 2024-11-21 | End: 2024-11-21

## 2024-11-21 RX ADMIN — LITHIUM CARBONATE 300 MILLIGRAM(S): 300 CAPSULE ORAL at 21:24

## 2024-11-21 RX ADMIN — LORAZEPAM 2 MILLIGRAM(S): 2 TABLET ORAL at 01:53

## 2024-11-21 NOTE — PROVIDER CONTACT NOTE (OTHER) - ACTION/TREATMENT ORDERED:
Resident Felton Benedict was notified of this event. Recommended for him to come assess pt at bedside. Resident Felton stated he would come assess shortly. No further actions taken at this time.

## 2024-11-21 NOTE — PROGRESS NOTE ADULT - ASSESSMENT
18yo male with PMH of autism, ADHD, history of episodes of agitation and aggression brought in by EMS from home for agitation.    *Agitation with hx of ADHD, autism  - pt with episodes of agitation, patient denies self-harm or SI, patient has been calm, downgraded to routine observation. No episodes of agitation last 48 hours  - Psychiatry recs appreciated, lithium level 11/14 0.53  - Continue Lithium 600 mg daily, Lithium 300mg qhs, further adjustment dose per psych  - continue Pristiq ER 50 mg daily and Seroquel 300mg BID; Ativan 2mg PO PRN agitation  - arrangements are being made for discharge, not returning to home, requires housing placement   - Appreciate Social Work involvement   - Recreational therapy   - CT head 11/7 negative after an  incident where pt hit his head after intake meeting        *DVT ppx  OOB, ambulation    GOC/Code Status: Full code    Dispo: Accepted to Crisis Housing in [place] [facility]. Intake meeting was 11/7.  s/p meeting 11/19. Per , awaiting bed availability

## 2024-11-21 NOTE — CHART NOTE - NSCHARTNOTEFT_GEN_A_CORE
Called by RN earlier in shift, patient agitated, pacing around in room, out in hallway, was reoriented to bed.     Patient got more agitated throughout night, code grey called on patient, went to room when security arrived. Smashed phone and headphones in room, then lied in bed.     Patient increasingly agitated throughout night, given 2mg Ativan IM.

## 2024-11-21 NOTE — PROVIDER CONTACT NOTE (OTHER) - ACTION/TREATMENT ORDERED:
Resident Felton Benedict was notified of these events. Stated to continue to monitor pt. No further orders/actions were taken at this time.

## 2024-11-21 NOTE — PROGRESS NOTE ADULT - SUBJECTIVE AND OBJECTIVE BOX
PROGRESS NOTE:     Patient is a 19y old  Male who presents with a chief complaint of Placement (20 Nov 2024 14:56)      SUBJECTIVE / OVERNIGHT EVENTS: pt was seen and evaluated todya  he was a code grey overnight  now more calm but repots feeling sleepy    ADDITIONAL REVIEW OF SYSTEMS:    MEDICATIONS  (STANDING):  desvenlafaxine ER 50 milliGRAM(s) Oral daily  lithium SR (LITHOBID) 300 milliGRAM(s) Oral at bedtime  lithium SR (LITHOBID) 600 milliGRAM(s) Oral daily  QUEtiapine 300 milliGRAM(s) Oral two times a day    MEDICATIONS  (PRN):  acetaminophen     Tablet .. 650 milliGRAM(s) Oral every 6 hours PRN Temp greater or equal to 38C (100.4F), Mild Pain (1 - 3)  aluminum hydroxide/magnesium hydroxide/simethicone Suspension 30 milliLiter(s) Oral every 4 hours PRN Dyspepsia  melatonin 3 milliGRAM(s) Oral at bedtime PRN Insomnia  ondansetron Injectable 4 milliGRAM(s) IV Push every 8 hours PRN Nausea and/or Vomiting  sodium chloride 0.65% Nasal 1 Spray(s) Both Nostrils daily PRN Nasal Congestion      CAPILLARY BLOOD GLUCOSE        I&O's Summary    20 Nov 2024 07:01  -  21 Nov 2024 07:00  --------------------------------------------------------  IN: 900 mL / OUT: 0 mL / NET: 900 mL        PHYSICAL EXAM:  Vital Signs Last 24 Hrs  T(C): 36.4 (21 Nov 2024 11:58), Max: 36.7 (20 Nov 2024 19:15)  T(F): 97.5 (21 Nov 2024 11:58), Max: 98.1 (20 Nov 2024 19:15)  HR: 114 (21 Nov 2024 11:58) (114 - 138)  BP: 139/93 (21 Nov 2024 11:58) (114/77 - 139/93)  BP(mean): --  RR: 17 (21 Nov 2024 11:58) (16 - 17)  SpO2: 98% (21 Nov 2024 11:58) (96% - 98%)    Parameters below as of 21 Nov 2024 11:58  Patient On (Oxygen Delivery Method): room air        PHYSICAL EXAM:  GENERAL: NAD  NERVOUS SYSTEM:  CN II - XII intact; Sensation intact; follows commands  CHEST/LUNG: Clear to auscultation bilaterally; No rales, rhonchi, wheezing, or rubs; normal respiratory effort, no intercostal retractions  HEART: Regular rate and rhythm; No murmurs, rubs, or gallops; No pitting edema  ABDOMEN: Soft, Nontender, Nondistended; Bowel sounds present; No HSM or masses  MUSCULOSKELETAL/EXTREMITIES:  2+ Peripheral Pulses, No clubbing or digital cyanosis; FROM of extremeties (pain, crepitation or contracture)  PSYCH: Appropriate affect, Alert & Oriented x 3    LABS:        n/a

## 2024-11-21 NOTE — CHART NOTE - NSCHARTNOTEFT_GEN_A_CORE
NUTRITION FOLLOW UP    SOURCE: Patient [X)   Family [ ]    Medical Record (X)    Diet, Regular (10-21-24 @ 12:13) [Active]    Patient noted with good appetite, consuming >75% of meals per nursing flowsheets. No issues with chewing/swallowing. Food preferences obtained and noted on patients file with nutrition office. Electrolytes stable at this time.     PO INTAKE: %     CURRENT WEIGHT:  205lbs (11/20)     PERTINENT MEDS:   Pertinent Medications: MEDICATIONS  (STANDING):  desvenlafaxine ER 50 milliGRAM(s) Oral daily  lithium SR (LITHOBID) 300 milliGRAM(s) Oral at bedtime  lithium SR (LITHOBID) 600 milliGRAM(s) Oral daily  QUEtiapine 300 milliGRAM(s) Oral two times a day    MEDICATIONS  (PRN):  acetaminophen     Tablet .. 650 milliGRAM(s) Oral every 6 hours PRN Temp greater or equal to 38C (100.4F), Mild Pain (1 - 3)  aluminum hydroxide/magnesium hydroxide/simethicone Suspension 30 milliLiter(s) Oral every 4 hours PRN Dyspepsia  melatonin 3 milliGRAM(s) Oral at bedtime PRN Insomnia  ondansetron Injectable 4 milliGRAM(s) IV Push every 8 hours PRN Nausea and/or Vomiting  sodium chloride 0.65% Nasal 1 Spray(s) Both Nostrils daily PRN Nasal Congestion      PERTINENT LABS:      SKIN:  no pressure ulcers   EDEMA: none  LAST BM: 11/19    ESTIMATED NEEDS:   [X] no change since previous assessment  [ ] recalculated:     PREVIOUS NUTRITION DIAGNOSIS:  N/A    NUTRITION DIAGNOSIS is :  N/A    NEW NUTRITION DIAGNOSIS: N/A    NUTRITION RECOMMENDATIONS:   1. Continue with current nutrition plan of care     MONITORING AND EVALUATION:   1. Tolerance to diet prescription   2. PO intake  3. Weights  4. Labs  5. Follow Up per protocol     RD to remain available   Gisel Rutherford RDN   [phone] or TEAMS

## 2024-11-22 LAB
ALBUMIN SERPL ELPH-MCNC: 4.6 G/DL — SIGNIFICANT CHANGE UP (ref 3.3–5)
ALP SERPL-CCNC: 85 U/L — SIGNIFICANT CHANGE UP (ref 40–120)
ALT FLD-CCNC: 30 U/L — SIGNIFICANT CHANGE UP (ref 10–45)
ANION GAP SERPL CALC-SCNC: 15 MMOL/L — SIGNIFICANT CHANGE UP (ref 5–17)
AST SERPL-CCNC: 30 U/L — SIGNIFICANT CHANGE UP (ref 10–40)
BASOPHILS # BLD AUTO: 0.09 K/UL — SIGNIFICANT CHANGE UP (ref 0–0.2)
BASOPHILS NFR BLD AUTO: 0.8 % — SIGNIFICANT CHANGE UP (ref 0–2)
BILIRUB SERPL-MCNC: 1.6 MG/DL — HIGH (ref 0.2–1.2)
BUN SERPL-MCNC: 12 MG/DL — SIGNIFICANT CHANGE UP (ref 7–23)
CALCIUM SERPL-MCNC: 9.5 MG/DL — SIGNIFICANT CHANGE UP (ref 8.4–10.5)
CHLORIDE SERPL-SCNC: 104 MMOL/L — SIGNIFICANT CHANGE UP (ref 96–108)
CO2 SERPL-SCNC: 24 MMOL/L — SIGNIFICANT CHANGE UP (ref 22–31)
CREAT SERPL-MCNC: 0.96 MG/DL — SIGNIFICANT CHANGE UP (ref 0.5–1.3)
EGFR: 116 ML/MIN/1.73M2 — SIGNIFICANT CHANGE UP
EOSINOPHIL # BLD AUTO: 0.08 K/UL — SIGNIFICANT CHANGE UP (ref 0–0.5)
EOSINOPHIL NFR BLD AUTO: 0.7 % — SIGNIFICANT CHANGE UP (ref 0–6)
GLUCOSE SERPL-MCNC: 106 MG/DL — HIGH (ref 70–99)
HCT VFR BLD CALC: 46.8 % — SIGNIFICANT CHANGE UP (ref 39–50)
HGB BLD-MCNC: 16.5 G/DL — SIGNIFICANT CHANGE UP (ref 13–17)
IMM GRANULOCYTES NFR BLD AUTO: 0.5 % — SIGNIFICANT CHANGE UP (ref 0–0.9)
LITHIUM SERPL-MCNC: 0.39 MMOL/L — LOW (ref 0.6–1.2)
LYMPHOCYTES # BLD AUTO: 1.95 K/UL — SIGNIFICANT CHANGE UP (ref 1–3.3)
LYMPHOCYTES # BLD AUTO: 17.5 % — SIGNIFICANT CHANGE UP (ref 13–44)
MAGNESIUM SERPL-MCNC: 2 MG/DL — SIGNIFICANT CHANGE UP (ref 1.6–2.6)
MCHC RBC-ENTMCNC: 30.9 PG — SIGNIFICANT CHANGE UP (ref 27–34)
MCHC RBC-ENTMCNC: 35.3 G/DL — SIGNIFICANT CHANGE UP (ref 32–36)
MCV RBC AUTO: 87.6 FL — SIGNIFICANT CHANGE UP (ref 80–100)
MONOCYTES # BLD AUTO: 0.88 K/UL — SIGNIFICANT CHANGE UP (ref 0–0.9)
MONOCYTES NFR BLD AUTO: 7.9 % — SIGNIFICANT CHANGE UP (ref 2–14)
NEUTROPHILS # BLD AUTO: 8.06 K/UL — HIGH (ref 1.8–7.4)
NEUTROPHILS NFR BLD AUTO: 72.6 % — SIGNIFICANT CHANGE UP (ref 43–77)
NRBC # BLD: 0 /100 WBCS — SIGNIFICANT CHANGE UP (ref 0–0)
PHOSPHATE SERPL-MCNC: 3.3 MG/DL — SIGNIFICANT CHANGE UP (ref 2.5–4.5)
PLATELET # BLD AUTO: 262 K/UL — SIGNIFICANT CHANGE UP (ref 150–400)
POTASSIUM SERPL-MCNC: 3.6 MMOL/L — SIGNIFICANT CHANGE UP (ref 3.5–5.3)
POTASSIUM SERPL-SCNC: 3.6 MMOL/L — SIGNIFICANT CHANGE UP (ref 3.5–5.3)
PROT SERPL-MCNC: 7.3 G/DL — SIGNIFICANT CHANGE UP (ref 6–8.3)
RBC # BLD: 5.34 M/UL — SIGNIFICANT CHANGE UP (ref 4.2–5.8)
RBC # FLD: 11.8 % — SIGNIFICANT CHANGE UP (ref 10.3–14.5)
SODIUM SERPL-SCNC: 143 MMOL/L — SIGNIFICANT CHANGE UP (ref 135–145)
WBC # BLD: 11.12 K/UL — HIGH (ref 3.8–10.5)
WBC # FLD AUTO: 11.12 K/UL — HIGH (ref 3.8–10.5)

## 2024-11-22 PROCEDURE — 99232 SBSQ HOSP IP/OBS MODERATE 35: CPT

## 2024-11-22 PROCEDURE — 93010 ELECTROCARDIOGRAM REPORT: CPT

## 2024-11-22 RX ORDER — LORAZEPAM 2 MG/1
2 TABLET ORAL ONCE
Refills: 0 | Status: DISCONTINUED | OUTPATIENT
Start: 2024-11-22 | End: 2024-11-22

## 2024-11-22 RX ORDER — OLANZAPINE 20 MG/1
5 TABLET ORAL ONCE
Refills: 0 | Status: COMPLETED | OUTPATIENT
Start: 2024-11-22 | End: 2024-11-22

## 2024-11-22 RX ORDER — CHLORHEXIDINE GLUCONATE 1.2 MG/ML
1 RINSE ORAL
Refills: 0 | Status: DISCONTINUED | OUTPATIENT
Start: 2024-11-22 | End: 2024-11-22

## 2024-11-22 RX ORDER — PANTOPRAZOLE SODIUM 40 MG/1
40 TABLET, DELAYED RELEASE ORAL
Refills: 0 | Status: DISCONTINUED | OUTPATIENT
Start: 2024-11-22 | End: 2024-12-13

## 2024-11-22 RX ADMIN — LITHIUM CARBONATE 600 MILLIGRAM(S): 300 CAPSULE ORAL at 11:58

## 2024-11-22 RX ADMIN — LITHIUM CARBONATE 300 MILLIGRAM(S): 300 CAPSULE ORAL at 21:15

## 2024-11-22 RX ADMIN — OLANZAPINE 5 MILLIGRAM(S): 20 TABLET ORAL at 03:56

## 2024-11-22 RX ADMIN — DESVENLAFAXINE SUCCINATE 50 MILLIGRAM(S): 50 TABLET, EXTENDED RELEASE ORAL at 11:59

## 2024-11-22 RX ADMIN — Medication 300 MILLIGRAM(S): at 18:35

## 2024-11-22 RX ADMIN — LORAZEPAM 2 MILLIGRAM(S): 2 TABLET ORAL at 01:15

## 2024-11-22 RX ADMIN — LORAZEPAM 2 MILLIGRAM(S): 2 TABLET ORAL at 04:30

## 2024-11-22 NOTE — CONSULT NOTE ADULT - ASSESSMENT
20yo male with PMH of autism, ADHD, history of episodes of agitation and aggression brought in by EMS from home for agitation.    # Hyperactive Delirium    - Continue Lithium as per Psych, adjust dose per trough  - Pristiq daily and Seroquel BID  - STAT EKG to assess QTC  - 4 point restraint until pt calms down and is not a threat toward staff  - 1-1 observation  - PRN Ativan IVP and Haldol IVP for aggressive/violent behavior    Dispo: Accepted to Crisis Housing in [facility]. Intake meeting was 11/7.  s/p meeting 11/19. Per SW, awaiting bed availability    Time spent on this patient encounter, which includes documenting this note in the electronic medical record, was 75+ minutes including assessing the presenting problems with associated risks, reviewing the medical record to prepare for the encounter, and meeting face to face with the patient to obtain additional history. I have also performed an appropriate physical exam, made interventions listed and ordered and interpreted appropriate diagnostic studies as documented. To improve communication and patient safety, I have coordinated care with the multidisciplinary team including the bedside nurse, appropriate attending of record and consultants as needed.    Date of entry of this note is equal to the date of services rendered    Plan discussed with Dr Robi REINOSO Attending 20yo male with PMH of autism, ADHD, history of episodes of agitation and aggression brought in by EMS from home for agitation.    # Hyperactive Delirium    - Continue Lithium as per Psych, adjust dose per trough  - Pristiq daily and Seroquel BID  - STAT EKG to assess QTC  - 4 point restraint until pt calms down and is not a threat toward staff, Vitals per policy  - 1-1 observation  - PRN Ativan IVP and Haldol IVP for aggressive/violent behavior    Dispo: Accepted to Crisis Housing in [facility]. Intake meeting was 11/7.  s/p meeting 11/19. Per SW, awaiting bed availability    Time spent on this patient encounter, which includes documenting this note in the electronic medical record, was 75+ minutes including assessing the presenting problems with associated risks, reviewing the medical record to prepare for the encounter, and meeting face to face with the patient to obtain additional history. I have also performed an appropriate physical exam, made interventions listed and ordered and interpreted appropriate diagnostic studies as documented. To improve communication and patient safety, I have coordinated care with the multidisciplinary team including the bedside nurse, appropriate attending of record and consultants as needed.    Date of entry of this note is equal to the date of services rendered    Plan discussed with Dr Robi REINOSO Attending

## 2024-11-22 NOTE — PROGRESS NOTE ADULT - SUBJECTIVE AND OBJECTIVE BOX
Follow-up Critical Care Progress Note  Chief Complaint : Other symptom or sign involving appearance or behavior    No new events overnight. Upgraded to ICU for aggressive behavior requiring restraints and sedation. Alert but drowsy, states feeling better today, stating he doesn't know what happened yesterday but would like to go back to sleep, states he's calm now.    Allergies: No Known Allergies      PAST MEDICAL & SURGICAL HISTORY:  Attention deficit hyperactivity disorder (ADHD), unspecified ADHD type  Autism    No significant past surgical history    Medications:  MEDICATIONS  (STANDING):  chlorhexidine 2% Cloths 1 Application(s) Topical <User Schedule>  desvenlafaxine ER 50 milliGRAM(s) Oral daily  lithium SR (LITHOBID) 300 milliGRAM(s) Oral at bedtime  lithium SR (LITHOBID) 600 milliGRAM(s) Oral daily  pantoprazole    Tablet 40 milliGRAM(s) Oral before breakfast  QUEtiapine 300 milliGRAM(s) Oral two times a day    MEDICATIONS  (PRN):  acetaminophen     Tablet .. 650 milliGRAM(s) Oral every 6 hours PRN Temp greater or equal to 38C (100.4F), Mild Pain (1 - 3)  aluminum hydroxide/magnesium hydroxide/simethicone Suspension 30 milliLiter(s) Oral every 4 hours PRN Dyspepsia  melatonin 3 milliGRAM(s) Oral at bedtime PRN Insomnia  ondansetron Injectable 4 milliGRAM(s) IV Push every 8 hours PRN Nausea and/or Vomiting  sodium chloride 0.65% Nasal 1 Spray(s) Both Nostrils daily PRN Nasal Congestion      Antibiotics History      Heme Medications       GI Medications  pantoprazole    Tablet 40 milliGRAM(s) Oral before breakfast, 11-22-24 @ 07:40, Routine      COVID  10-20-24 @ 15:10  COVID -   [facility]      COVID Biomarkers            Trend Cardiac Enzymes    Trend BNP    Procalcitonin Trend    WBC Trend  11-22-24 @ 06:00   -  11.12[H]    H/H Trend  11-22-24 @ 06:00   -   16.5/ 46.8  11-08-24 @ 08:41   -   16.7/ 47.2    Stool Occult Blood    Platelet Trend  11-22-24 @ 06:00   -  262    Trend Sodium  11-22-24 @ 06:00   -  143    Trend Potassium  11-22-24 @ 06:00   -  3.6    Trend Bun/Cr  11-22-24 @ 06:00  BUN/CR -  12 / 0.96    Lactic Acid Trend    ABG Trend    Trend AST/ALT/ALK Phos/Bili  11-22-24 @ 06:00   30/30/85/1.6[H]  11-08-24 @ 08:41   21/32/96/1.0  10-20-24 @ 15:10   25/25/115/0.8  03-13-24 @ 19:35   23/28/84/0.7      Ammonia Trend      Amylase / Lipase Trend      Albumin Trend  11-22-24 @ 06:00   -   4.6  11-08-24 @ 08:41   -   3.9  10-20-24 @ 15:10   -   4.7  03-13-24 @ 19:35   -   4.5      PTT - PT - INR Trend    Glucose Trend  11-22-24 @ 06:00   -  106[H] -- --        LABS:                        16.5   11.12 )-----------( 262      ( 22 Nov 2024 06:00 )             46.8     11-22    143  |  104  |  12  ----------------------------<  106[H]  3.6   |  24  |  0.96    Ca    9.5      22 Nov 2024 06:00  Phos  3.3     11-22  Mg     2.0     11-22    TPro  7.3  /  Alb  4.6  /  TBili  1.6[H]  /  DBili  x   /  AST  30  /  ALT  30  /  AlkPhos  85  11-22              Urinalysis Basic - ( 22 Nov 2024 06:00 )    Color: x / Appearance: x / SG: x / pH: x  Gluc: 106 mg/dL / Ketone: x  / Bili: x / Urobili: x   Blood: x / Protein: x / Nitrite: x   Leuk Esterase: x / RBC: x / WBC x   Sq Epi: x / Non Sq Epi: x / Bacteria: x              CULTURES: (if applicable)          CAPILLARY BLOOD GLUCOSE          RADIOLOGY  No recent imaging to review at this time       VITALS:  T(C): 36.8 (11-22-24 @ 04:15), Max: 36.8 (11-22-24 @ 04:15)  T(F): 98.2 (11-22-24 @ 04:15), Max: 98.2 (11-22-24 @ 04:15)  HR: 98 (11-22-24 @ 08:15) (88 - 119)  BP: 105/63 (11-22-24 @ 08:00) (99/78 - 139/93)  BP(mean): 76 (11-22-24 @ 08:00) (76 - 113)  ABP: --  ABP(mean): --  RR: 20 (11-22-24 @ 08:15) (9 - 24)  SpO2: 95% (11-22-24 @ 08:15) (94% - 98%)  CVP(mm Hg): --  CVP(cm H2O): --    Ins and Outs               I&O's Detail      Physical Examination:  GENERAL:                Alert, Drowsy, No acute distress.    HEENT:                    Pupils equal, reactive to light.  Symmetric. Moist MM  PULM:                     Bilateral air entry, Clear to auscultation bilaterally, no significant sputum production, No Rales, No Rhonchi, No Wheezing  CVS:                         S1, S2,  No Murmur  ABD:                         Soft, nondistended, nontender, normoactive bowel sounds,   EXT:                         No edema, nontender, No Cyanosis or Clubbing   Vascular:                  Warm Extremities, Normal Capillary refill, Normal Distal Pulses  SKIN:                       Warm and well perfused, no rashes noted.   NEURO:                   Alert, interactive, nonfocal, follows commands  PSYC:                       Calm, + Insight.

## 2024-11-22 NOTE — CHART NOTE - NSCHARTNOTEFT_GEN_A_CORE
Informed by nurse patient become agitated, screaming and trying to break the Halo Camera.  Trying to examined the patient but did no allow to enter to the room. Was demanded to let him alone.  But due to Physiatrist causes patient needs to be on observation to the camera.    Plan:  Ativan 2mg IM Informed by nurse patient become agitated, screaming and trying to break the Hello Camera.  Trying to examined the patient but did no allow to enter to the room. Was demanded to let him alone.  Past events of self harming and concern of suicidal ideation.     Plan:  Ativan 2mg IM  c/w Enhance supervision

## 2024-11-22 NOTE — BH CONSULTATION LIAISON PROGRESS NOTE - NSBHTIMEACTIVITIESPERFORMED_PSY_A_CORE
collaborating with treatment team, parental clinical update, face to face time with the patient, chart review.

## 2024-11-22 NOTE — RAPID RESPONSE TEAM SUMMARY - NSSITUATIONBACKGROUNDRRT_GEN_ALL_CORE
RRT was called for Agitated patient. On arrivals patient noted on 4 points restrains and actively yelling to the staff. Per nurse manager patient was held by 10 people to keep the patient down.  RRT was called for Agitated patient. On arrivals patient noted on 4 points restrains and actively yelling to the staff. Per nurse manager patient was held by 10 people to keep the patient down. Patient remain combative and threatening the staff

## 2024-11-22 NOTE — PROVIDER CONTACT NOTE (OTHER) - ACTION/TREATMENT ORDERED:
Zyprexa IM ordered. Overnight nursing supervisor, two ED nurse, two ICU nurses and three security guards were needed to place pt on leather restraints. Pt was transferred to ICU after RRT was called. Zyprexa IM ordered. Pt still violent. RRT called. nursing supervisor, two ED nurse, two ICU nurses and three security guards were needed to place pt on leather restraints. Pt was transferred to ICU.

## 2024-11-22 NOTE — BH CONSULTATION LIAISON PROGRESS NOTE - NSBHCONSULTPRIMARYDISCUSSYES_PSY_A_CORE FT
as per above, and case discussed with , team may need to revisit application for acute inpatient psychiatric care as behavior would be problematic in the community.   Pt has had reasonable medication adjustments as well as very supportive staff and add on of recreation therapy and pet therapy when available.   Will reassess mental status.

## 2024-11-22 NOTE — PROVIDER CONTACT NOTE (OTHER) - ASSESSMENT
RN attempted to speak to pt and reorient him. Pt was able to be placed back into bed. however, was still refusing the hello -2 camera.
Pt began to hallucinate, stated the wall was talking to him and started to get violent towards staff. Security guards were notified and came to pt's bedside. Attempted to deescalate situation however, pt was uncooperative was began to get increasingly agitated.
when assessed pt had return back to bed by himself. Vital signs as per flow sheet and pt stated he was not in any pain or discomfort.

## 2024-11-22 NOTE — PROGRESS NOTE ADULT - ASSESSMENT
Patient is a 18yo male with PMH of autism, ADHD, history of episodes of agitation and aggression brought in by EMS from home for agitation, awaiting placement [facility].    #Hyperactive Delirium  - Continue Lithium as per Psych, adjust dose per trough  - F/U AM lithium level  - C/w pristiq daily and seroquel BID  - EKG this morning w/ Qtc 432, follow EKGs to monitor  - 4 point restraints discontinued today, patient calm w/o any combative/unsafe behavior   - C/w 1:1 observation for now   - PRN Ativan IVP for aggressive/violent behavior  - Stable for downgrade to medical floors      Discussed with attending, Dr Mathew

## 2024-11-22 NOTE — PROVIDER CONTACT NOTE (OTHER) - RECOMMENDATIONS
Pt was still refusing hello- 2 camera. As per overnight nursing supervisor, pt's room needed to be cleared out in order to maintain pt safety. Security guards were notified and remained on stand by.

## 2024-11-22 NOTE — BH CONSULTATION LIAISON PROGRESS NOTE - NSBHCONSULTRECOMMENDOTHER_PSY_A_CORE FT
Continue Lithium  Consider d/c Pristiq ( unclear what it is adding to his regimen)  Continue Quetiapine.     Pt had been only on Risperdal and Adderall, pt had been on Depakote, as well as Abilify which was the first medication he was on.   Pt had been seen by neurology and work up for seizure negative.   Per mother developmental pediatrician who is last clinician started current regimen, which has been the most effective in stopping agitation which typically starts with breaking property and escalating to physical aggression. Pristiq was started due to depression/anxiety ( per mother pt had a rash on his hands due to anxiety and nervousness). Dr. [facility] [phone].

## 2024-11-22 NOTE — RAPID RESPONSE TEAM SUMMARY - NSOTHERINTERVENTIONSRRT_GEN_ALL_CORE
Transferred to CCU for closely monitor of  Transferred to CCU for closely monitor of Hyperactive Delirium

## 2024-11-22 NOTE — CONSULT NOTE ADULT - SUBJECTIVE AND OBJECTIVE BOX
Patient is a 19y old  Male who presents with a chief complaint of Placement (21 Nov 2024 14:37)      BRIEF HOSPITAL COURSE: 18yo male with PMH of autism, ADHD, history of episodes of agitation and aggression brought in by EMS from home for agitation.    Events last 24 hours: Code [employer] and RRT called for Hyperactive delirium and Aggression trying to bite staff. Pt stating that we are trying to use him as a sex slave. Pt held down by staff and put in 4 point leather restraints. Upon my arrival pt with intermittent aggression yelling at staff.     PAST MEDICAL & SURGICAL HISTORY:  Attention deficit hyperactivity disorder (ADHD), unspecified ADHD type      Autism      Autism      No significant past surgical history        Allergies    No Known Allergies    Intolerances      FAMILY HISTORY:  No pertinent family history in first degree relatives      SOCIAL HISTORY:     Review of Systems:  Pt denies any distress    Medications:        acetaminophen     Tablet .. 650 milliGRAM(s) Oral every 6 hours PRN  desvenlafaxine ER 50 milliGRAM(s) Oral daily  lithium SR (LITHOBID) 300 milliGRAM(s) Oral at bedtime  lithium SR (LITHOBID) 600 milliGRAM(s) Oral daily  LORazepam   Injectable 2 milliGRAM(s) IV Push once  melatonin 3 milliGRAM(s) Oral at bedtime PRN  OLANZapine Injectable 5 milliGRAM(s) IntraMuscular once  ondansetron Injectable 4 milliGRAM(s) IV Push every 8 hours PRN  QUEtiapine 300 milliGRAM(s) Oral two times a day        aluminum hydroxide/magnesium hydroxide/simethicone Suspension 30 milliLiter(s) Oral every 4 hours PRN            chlorhexidine 2% Cloths 1 Application(s) Topical <User Schedule>  sodium chloride 0.65% Nasal 1 Spray(s) Both Nostrils daily PRN            ICU Vital Signs Last 24 Hrs  T(C): 36.8 (22 Nov 2024 04:15), Max: 36.8 (22 Nov 2024 04:15)  T(F): 98.2 (22 Nov 2024 04:15), Max: 98.2 (22 Nov 2024 04:15)  HR: 119 (22 Nov 2024 04:15) (94 - 119)  BP: 126/105 (22 Nov 2024 04:15) (126/105 - 139/93)  BP(mean): 113 (22 Nov 2024 04:15) (113 - 113)  ABP: --  ABP(mean): --  RR: 14 (22 Nov 2024 04:15) (14 - 17)  SpO2: 97% (22 Nov 2024 04:15) (94% - 98%)    O2 Parameters below as of 22 Nov 2024 04:15  Patient On (Oxygen Delivery Method): room air          Vital Signs Last 24 Hrs  T(C): 36.8 (22 Nov 2024 04:15), Max: 36.8 (22 Nov 2024 04:15)  T(F): 98.2 (22 Nov 2024 04:15), Max: 98.2 (22 Nov 2024 04:15)  HR: 119 (22 Nov 2024 04:15) (94 - 119)  BP: 126/105 (22 Nov 2024 04:15) (126/105 - 139/93)  BP(mean): 113 (22 Nov 2024 04:15) (113 - 113)  RR: 14 (22 Nov 2024 04:15) (14 - 17)  SpO2: 97% (22 Nov 2024 04:15) (94% - 98%)    Parameters below as of 22 Nov 2024 04:15  Patient On (Oxygen Delivery Method): room air            I&O's Detail    20 Nov 2024 07:01  -  21 Nov 2024 07:00  --------------------------------------------------------  IN:    Oral Fluid: 900 mL  Total IN: 900 mL    OUT:  Total OUT: 0 mL    Total NET: 900 mL          LABS:                CAPILLARY BLOOD GLUCOSE              CULTURES:      Physical Examination:    General: Well appearing, lying in bed in NAD.    HEENT: Pupils equal, reactive to light. Symmetric. No scleral icterus or injection.    PULM: Clear to auscultation B/L. No wheezes, rales, or rhonchi appreciated. No significant sputum production or increased respiratory effort.    NECK: Supple, no lymphadenopathy, trachea midline.    CVS: Regular rate and rhythm, no murmurs appreciated, +s1/s2.    ABD: Soft, nondistended, nontender, normoactive bowel sounds.    EXT: No edema, nontender.    SKIN: Warm and well perfused, no rashes noted.    NEURO: Alert, Intermittently confused, interactive, nonfocal.    RADIOLOGY: < from: CT Head No Cont (10.20.24 @ 15:35) >    ACC: [id number] EXAM:  CT BRAIN   ORDERED BY: ARMAAN SERRANO     PROCEDURE DATE:  10/20/2024          INTERPRETATION:  CLINICAL INFORMATION: headaches    COMPARISON: Brain MRI May 2, 2020.    Technique:  Multiple axial sections were acquired from the base of the skull to the   vertex without contrast enhancement. Coronal and sagittal reconstructed   images were performed as well.    Findings:  The lateral ventricles have a normal configuration.    There is no evidence of acute hemorrhage, mass or mass-effect in the   posterior fossa or in the supratentorial region.    Evaluation of the osseous structures with the appropriate window appears   unremarkable.    Bilateral maxillary, bilateral sphenoid and right frontal sinus mucosal   thickeningis seen.    Both mastoid and middle regions appear clear.    Impression:  Sinus mucosal thickening otherwise unremarkable noncontrast head CT.      --- End of Report ---            MILLIE THACKER MD; Attending Radiologist  This document has been electronically signed. Oct 20 2024  3:39PM    < end of copied text >     Patient is a 19y old  Male who presents with a chief complaint of Placement (21 Nov 2024 14:37)      BRIEF HOSPITAL COURSE: 20yo male with PMH of autism, ADHD, history of episodes of agitation and aggression brought in by EMS from home for agitation.    Events last 24 hours: Code [place] and RRT called for Hyperactive delirium and Aggression trying to bite staff. Pt stating that we are trying to use him as a sex slave. Pt held down by multiple members of staff and put in 4 point leather restraints.     Upon my arrival, Pt in restraints, aggressive yelling at staff, admit to ICU for further care and closer monitoring, HDS.    PAST MEDICAL & SURGICAL HISTORY:  Attention deficit hyperactivity disorder (ADHD), unspecified ADHD type      Autism      Autism      No significant past surgical history        Allergies    No Known Allergies    Intolerances      FAMILY HISTORY:  No pertinent family history in first degree relatives      SOCIAL HISTORY:     Review of Systems:  Pt denies any distress    Medications:        acetaminophen     Tablet .. 650 milliGRAM(s) Oral every 6 hours PRN  desvenlafaxine ER 50 milliGRAM(s) Oral daily  lithium SR (LITHOBID) 300 milliGRAM(s) Oral at bedtime  lithium SR (LITHOBID) 600 milliGRAM(s) Oral daily  LORazepam   Injectable 2 milliGRAM(s) IV Push once  melatonin 3 milliGRAM(s) Oral at bedtime PRN  OLANZapine Injectable 5 milliGRAM(s) IntraMuscular once  ondansetron Injectable 4 milliGRAM(s) IV Push every 8 hours PRN  QUEtiapine 300 milliGRAM(s) Oral two times a day        aluminum hydroxide/magnesium hydroxide/simethicone Suspension 30 milliLiter(s) Oral every 4 hours PRN            chlorhexidine 2% Cloths 1 Application(s) Topical <User Schedule>  sodium chloride 0.65% Nasal 1 Spray(s) Both Nostrils daily PRN            ICU Vital Signs Last 24 Hrs  T(C): 36.8 (22 Nov 2024 04:15), Max: 36.8 (22 Nov 2024 04:15)  T(F): 98.2 (22 Nov 2024 04:15), Max: 98.2 (22 Nov 2024 04:15)  HR: 119 (22 Nov 2024 04:15) (94 - 119)  BP: 126/105 (22 Nov 2024 04:15) (126/105 - 139/93)  BP(mean): 113 (22 Nov 2024 04:15) (113 - 113)  ABP: --  ABP(mean): --  RR: 14 (22 Nov 2024 04:15) (14 - 17)  SpO2: 97% (22 Nov 2024 04:15) (94% - 98%)    O2 Parameters below as of 22 Nov 2024 04:15  Patient On (Oxygen Delivery Method): room air          Vital Signs Last 24 Hrs  T(C): 36.8 (22 Nov 2024 04:15), Max: 36.8 (22 Nov 2024 04:15)  T(F): 98.2 (22 Nov 2024 04:15), Max: 98.2 (22 Nov 2024 04:15)  HR: 119 (22 Nov 2024 04:15) (94 - 119)  BP: 126/105 (22 Nov 2024 04:15) (126/105 - 139/93)  BP(mean): 113 (22 Nov 2024 04:15) (113 - 113)  RR: 14 (22 Nov 2024 04:15) (14 - 17)  SpO2: 97% (22 Nov 2024 04:15) (94% - 98%)    Parameters below as of 22 Nov 2024 04:15  Patient On (Oxygen Delivery Method): room air            I&O's Detail    20 Nov 2024 07:01  -  21 Nov 2024 07:00  --------------------------------------------------------  IN:    Oral Fluid: 900 mL  Total IN: 900 mL    OUT:  Total OUT: 0 mL    Total NET: 900 mL          LABS:                CAPILLARY BLOOD GLUCOSE              CULTURES:      Physical Examination:    General: Well appearing, lying in bed in NAD.    HEENT: Pupils equal, reactive to light. Symmetric. No scleral icterus or injection.    PULM: Clear to auscultation B/L. No wheezes, rales, or rhonchi appreciated. No significant sputum production or increased respiratory effort.    NECK: Supple, no lymphadenopathy, trachea midline.    CVS: Regular rate and rhythm, no murmurs appreciated, +s1/s2.    ABD: Soft, nondistended, nontender, normoactive bowel sounds.    EXT: No edema, nontender.    SKIN: Warm and well perfused, no rashes noted.    NEURO: Alert, Intermittently confused, interactive, nonfocal.    RADIOLOGY: < from: CT Head No Cont (10.20.24 @ 15:35) >    ACC: [id number] EXAM:  CT BRAIN   ORDERED BY: ARMAAN SERRANO     PROCEDURE DATE:  10/20/2024          INTERPRETATION:  CLINICAL INFORMATION: headaches    COMPARISON: Brain MRI May 2, 2020.    Technique:  Multiple axial sections were acquired from the base of the skull to the   vertex without contrast enhancement. Coronal and sagittal reconstructed   images were performed as well.    Findings:  The lateral ventricles have a normal configuration.    There is no evidence of acute hemorrhage, mass or mass-effect in the   posterior fossa or in the supratentorial region.    Evaluation of the osseous structures with the appropriate window appears   unremarkable.    Bilateral maxillary, bilateral sphenoid and right frontal sinus mucosal   thickeningis seen.    Both mastoid and middle regions appear clear.    Impression:  Sinus mucosal thickening otherwise unremarkable noncontrast head CT.      --- End of Report ---            MILLIE THACKER MD; Attending Radiologist  This document has been electronically signed. Oct 20 2024  3:39PM    < end of copied text >     Patient is a 19y old  Male who presents with a chief complaint of Placement (21 Nov 2024 14:37)      BRIEF HOSPITAL COURSE: 18yo male with PMH of autism, ADHD, history of episodes of agitation and aggression brought in by EMS from home for agitation.    Events last 24 hours: Code [employer] and RRT called for Hyperactive delirium and Aggression trying to bite staff. Pt stating that we are trying to use him as a sex slave. Pt held down by multiple members of staff and put in 4 point leather restraints.     Upon my arrival, Pt in restraints, aggressive yelling at staff, admit to ICU for further care and closer monitoring, HDS.    PAST MEDICAL & SURGICAL HISTORY:  Attention deficit hyperactivity disorder (ADHD), unspecified ADHD type      Autism      Autism      No significant past surgical history        Allergies    No Known Allergies    Intolerances      FAMILY HISTORY:  No pertinent family history in first degree relatives      SOCIAL HISTORY:     Review of Systems:  Pt denies any distress    Medications:        acetaminophen     Tablet .. 650 milliGRAM(s) Oral every 6 hours PRN  desvenlafaxine ER 50 milliGRAM(s) Oral daily  lithium SR (LITHOBID) 300 milliGRAM(s) Oral at bedtime  lithium SR (LITHOBID) 600 milliGRAM(s) Oral daily  LORazepam   Injectable 2 milliGRAM(s) IV Push once  melatonin 3 milliGRAM(s) Oral at bedtime PRN  OLANZapine Injectable 5 milliGRAM(s) IntraMuscular once  ondansetron Injectable 4 milliGRAM(s) IV Push every 8 hours PRN  QUEtiapine 300 milliGRAM(s) Oral two times a day        aluminum hydroxide/magnesium hydroxide/simethicone Suspension 30 milliLiter(s) Oral every 4 hours PRN            chlorhexidine 2% Cloths 1 Application(s) Topical <User Schedule>  sodium chloride 0.65% Nasal 1 Spray(s) Both Nostrils daily PRN            ICU Vital Signs Last 24 Hrs  T(C): 36.8 (22 Nov 2024 04:15), Max: 36.8 (22 Nov 2024 04:15)  T(F): 98.2 (22 Nov 2024 04:15), Max: 98.2 (22 Nov 2024 04:15)  HR: 119 (22 Nov 2024 04:15) (94 - 119)  BP: 126/105 (22 Nov 2024 04:15) (126/105 - 139/93)  BP(mean): 113 (22 Nov 2024 04:15) (113 - 113)  ABP: --  ABP(mean): --  RR: 14 (22 Nov 2024 04:15) (14 - 17)  SpO2: 97% (22 Nov 2024 04:15) (94% - 98%)    O2 Parameters below as of 22 Nov 2024 04:15  Patient On (Oxygen Delivery Method): room air          Vital Signs Last 24 Hrs  T(C): 36.8 (22 Nov 2024 04:15), Max: 36.8 (22 Nov 2024 04:15)  T(F): 98.2 (22 Nov 2024 04:15), Max: 98.2 (22 Nov 2024 04:15)  HR: 119 (22 Nov 2024 04:15) (94 - 119)  BP: 126/105 (22 Nov 2024 04:15) (126/105 - 139/93)  BP(mean): 113 (22 Nov 2024 04:15) (113 - 113)  RR: 14 (22 Nov 2024 04:15) (14 - 17)  SpO2: 97% (22 Nov 2024 04:15) (94% - 98%)    Parameters below as of 22 Nov 2024 04:15  Patient On (Oxygen Delivery Method): room air            I&O's Detail    20 Nov 2024 07:01  -  21 Nov 2024 07:00  --------------------------------------------------------  IN:    Oral Fluid: 900 mL  Total IN: 900 mL    OUT:  Total OUT: 0 mL    Total NET: 900 mL          LABS:                CAPILLARY BLOOD GLUCOSE              CULTURES:      Physical Examination:    General: Intermittent agitiation    HEENT: Pupils equal, reactive to light. Symmetric. No scleral icterus or injection.    PULM: Clear to auscultation B/L. No wheezes, rales, or rhonchi appreciated. No significant sputum production or increased respiratory effort.    NECK: Supple, no lymphadenopathy, trachea midline.    CVS: Regular rate and rhythm, no murmurs appreciated, +s1/s2.    ABD: Soft, nondistended, nontender, normoactive bowel sounds.    EXT: No edema, nontender.    SKIN: Warm and well perfused, no rashes noted.    NEURO: Alert, Intermittently confused, interactive, nonfocal.    RADIOLOGY: < from: CT Head No Cont (10.20.24 @ 15:35) >    ACC: [id number] EXAM:  CT BRAIN   ORDERED BY: ARMAAN SERRANO     PROCEDURE DATE:  10/20/2024          INTERPRETATION:  CLINICAL INFORMATION: headaches    COMPARISON: Brain MRI May 2, 2020.    Technique:  Multiple axial sections were acquired from the base of the skull to the   vertex without contrast enhancement. Coronal and sagittal reconstructed   images were performed as well.    Findings:  The lateral ventricles have a normal configuration.    There is no evidence of acute hemorrhage, mass or mass-effect in the   posterior fossa or in the supratentorial region.    Evaluation of the osseous structures with the appropriate window appears   unremarkable.    Bilateral maxillary, bilateral sphenoid and right frontal sinus mucosal   thickeningis seen.    Both mastoid and middle regions appear clear.    Impression:  Sinus mucosal thickening otherwise unremarkable noncontrast head CT.      --- End of Report ---            MILLIE THACKER MD; Attending Radiologist  This document has been electronically signed. Oct 20 2024  3:39PM    < end of copied text >

## 2024-11-23 LAB
ALBUMIN SERPL ELPH-MCNC: 4.1 G/DL — SIGNIFICANT CHANGE UP (ref 3.3–5)
ALP SERPL-CCNC: 79 U/L — SIGNIFICANT CHANGE UP (ref 40–120)
ALT FLD-CCNC: 33 U/L — SIGNIFICANT CHANGE UP (ref 10–45)
ANION GAP SERPL CALC-SCNC: 10 MMOL/L — SIGNIFICANT CHANGE UP (ref 5–17)
AST SERPL-CCNC: 29 U/L — SIGNIFICANT CHANGE UP (ref 10–40)
BILIRUB SERPL-MCNC: 1.5 MG/DL — HIGH (ref 0.2–1.2)
BUN SERPL-MCNC: 13 MG/DL — SIGNIFICANT CHANGE UP (ref 7–23)
CALCIUM SERPL-MCNC: 9.3 MG/DL — SIGNIFICANT CHANGE UP (ref 8.4–10.5)
CHLORIDE SERPL-SCNC: 106 MMOL/L — SIGNIFICANT CHANGE UP (ref 96–108)
CO2 SERPL-SCNC: 28 MMOL/L — SIGNIFICANT CHANGE UP (ref 22–31)
CREAT SERPL-MCNC: 1 MG/DL — SIGNIFICANT CHANGE UP (ref 0.5–1.3)
EGFR: 110 ML/MIN/1.73M2 — SIGNIFICANT CHANGE UP
GLUCOSE SERPL-MCNC: 90 MG/DL — SIGNIFICANT CHANGE UP (ref 70–99)
HCT VFR BLD CALC: 47.7 % — SIGNIFICANT CHANGE UP (ref 39–50)
HGB BLD-MCNC: 16.7 G/DL — SIGNIFICANT CHANGE UP (ref 13–17)
LITHIUM SERPL-MCNC: 0.71 MMOL/L — SIGNIFICANT CHANGE UP (ref 0.6–1.2)
MAGNESIUM SERPL-MCNC: 2.1 MG/DL — SIGNIFICANT CHANGE UP (ref 1.6–2.6)
MCHC RBC-ENTMCNC: 31.2 PG — SIGNIFICANT CHANGE UP (ref 27–34)
MCHC RBC-ENTMCNC: 35 G/DL — SIGNIFICANT CHANGE UP (ref 32–36)
MCV RBC AUTO: 89 FL — SIGNIFICANT CHANGE UP (ref 80–100)
NRBC # BLD: 0 /100 WBCS — SIGNIFICANT CHANGE UP (ref 0–0)
PHOSPHATE SERPL-MCNC: 4 MG/DL — SIGNIFICANT CHANGE UP (ref 2.5–4.5)
PLATELET # BLD AUTO: 221 K/UL — SIGNIFICANT CHANGE UP (ref 150–400)
POTASSIUM SERPL-MCNC: 3.7 MMOL/L — SIGNIFICANT CHANGE UP (ref 3.5–5.3)
POTASSIUM SERPL-SCNC: 3.7 MMOL/L — SIGNIFICANT CHANGE UP (ref 3.5–5.3)
PROT SERPL-MCNC: 7.1 G/DL — SIGNIFICANT CHANGE UP (ref 6–8.3)
RBC # BLD: 5.36 M/UL — SIGNIFICANT CHANGE UP (ref 4.2–5.8)
RBC # FLD: 12 % — SIGNIFICANT CHANGE UP (ref 10.3–14.5)
SODIUM SERPL-SCNC: 144 MMOL/L — SIGNIFICANT CHANGE UP (ref 135–145)
WBC # BLD: 7.29 K/UL — SIGNIFICANT CHANGE UP (ref 3.8–10.5)
WBC # FLD AUTO: 7.29 K/UL — SIGNIFICANT CHANGE UP (ref 3.8–10.5)

## 2024-11-23 PROCEDURE — 99233 SBSQ HOSP IP/OBS HIGH 50: CPT

## 2024-11-23 PROCEDURE — 99232 SBSQ HOSP IP/OBS MODERATE 35: CPT

## 2024-11-23 RX ADMIN — DESVENLAFAXINE SUCCINATE 50 MILLIGRAM(S): 50 TABLET, EXTENDED RELEASE ORAL at 12:20

## 2024-11-23 RX ADMIN — PANTOPRAZOLE SODIUM 40 MILLIGRAM(S): 40 TABLET, DELAYED RELEASE ORAL at 05:21

## 2024-11-23 RX ADMIN — LITHIUM CARBONATE 300 MILLIGRAM(S): 300 CAPSULE ORAL at 21:10

## 2024-11-23 RX ADMIN — Medication 300 MILLIGRAM(S): at 05:21

## 2024-11-23 RX ADMIN — Medication 300 MILLIGRAM(S): at 17:45

## 2024-11-23 RX ADMIN — LITHIUM CARBONATE 600 MILLIGRAM(S): 300 CAPSULE ORAL at 12:21

## 2024-11-23 NOTE — BH CONSULTATION LIAISON PROGRESS NOTE - NSBHCHARTREVIEWINVESTIGATE_PSY_A_CORE FT
< from: 12 Lead ECG (11.22.24 @ 04:50) >    Ventricular Rate 102 BPM    Atrial Rate 102 BPM    P-R Interval 124 ms    QRS Duration 90 ms    Q-T Interval 332 ms    QTC Calculation(Bazett) 432 ms    P Axis 63 degrees    R Axis 67 degrees    T Axis 57 degrees    Diagnosis Line Sinus tachycardia  Otherwise normal ECG  When compared with ECG of 20-OCT-2024 14:56,  No significant change was found  Confirmed by SULLY HELLER ([id number]) on 11/22/2024 12:47:22 PM    < end of copied text >    
< from: CT Head No Cont (11.07.24 @ 19:12) >    ACC: [id number] EXAM:  CT BRAIN   ORDERED BY:  AGATHA DIOP     PROCEDURE DATE:  11/07/2024          INTERPRETATION:  CLINICAL INDICATION:  patient hit head with metal pipe,   r/o hemorrhage    TECHNIQUE: Noncontrast CT examination of the head was performed.  Coronal and sagittal reformats were also obtained.    COMPARISON: 10/20/2024    FINDINGS:  INTRA-AXIAL: No intracranial mass effect, acute hemorrhage, midline shift   or acute transcortical infarct is seen.  EXTRA-AXIAL: No extra-axialfluid collection is present.  VENTRICLES AND SULCI: Parenchymal volume is commensurate with patient   age. No hydrocephalus.  VISUALIZED SINUSES: Moderate mucosal thickening.  VISUALIZED MASTOIDS: Clear.  CALVARIUM: Normal.    IMPRESSION:    No evidence of acute intracranial hemorrhage, midline shift or CT   evidence of acute territorial infarct.    If the patient's symptoms persist, consider short interval follow-up head   CT or brain MRI if there are no MRI contraindications.    --- End of Report ---            MICHAEL AHMADI MD; Attending Radiologist  This document has been electronically signed. Nov 7 2024  7:25PM    < end of copied text >    
< from: 12 Lead ECG (11.22.24 @ 04:50) >    Ventricular Rate 102 BPM    Atrial Rate 102 BPM    P-R Interval 124 ms    QRS Duration 90 ms    Q-T Interval 332 ms    QTC Calculation(Bazett) 432 ms    P Axis 63 degrees    R Axis 67 degrees    T Axis 57 degrees    Diagnosis Line Sinus tachycardia  Otherwise normal ECG  When compared with ECG of 20-OCT-2024 14:56,  No significant change was found  Confirmed by SULLY HELLER (1926) on 11/22/2024 12:47:22 PM    < end of copied text >    
< from: CT Head No Cont (10.20.24 @ 15:35) >    ACC: [id number] EXAM:  CT BRAIN   ORDERED BY: ARMAAN SERRANO     PROCEDURE DATE:  10/20/2024          INTERPRETATION:  CLINICAL INFORMATION: headaches    COMPARISON: Brain MRI May 2, 2020.    Technique:  Multiple axial sections were acquired from the base of the skull to the   vertex without contrast enhancement. Coronal and sagittal reconstructed   images were performed as well.    Findings:  The lateral ventricles have a normal configuration.    There is no evidence of acute hemorrhage, mass or mass-effect in the   posterior fossa or in the supratentorial region.    Evaluation of the osseous structures with the appropriate window appears   unremarkable.    Bilateral maxillary, bilateral sphenoid and right frontal sinus mucosal   thickeningis seen.    Both mastoid and middle regions appear clear.    Impression:  Sinus mucosal thickening otherwise unremarkable noncontrast head CT.      --- End of Report ---            MILLIE THACKER MD; Attending Radiologist  This document has been electronically signed. Oct 20 2024  3:39PM    < end of copied text >    < from: 12 Lead ECG (10.20.24 @ 14:56) >    Ventricular Rate 120 BPM    Atrial Rate 120 BPM    P-R Interval 130 ms    QRS Duration 88 ms    Q-T Interval 278 ms    QTC Calculation(Bazett) 392 ms    P Axis 51 degrees    R Axis 56 degrees    T Axis 48 degrees    Diagnosis Line Sinus tachycardia  Otherwise normal ECG  No previous ECGs available  Confirmed by TOMÁS STOREY, ANSHUL CLEMENTE ([id number]) on 10/21/2024 8:33:58 AM    < end of copied text >

## 2024-11-23 NOTE — BH CONSULTATION LIAISON PROGRESS NOTE - NSBHCONSULTPRIMARYDISCUSSYES_PSY_A_CORE FT
as per above.   discussed potential for acute inpatient care-particularly if acceptance of diagnosis and his ability to cope continues to precipitate episodes of agitation /combativeness, as he may not be able to be placed should these behaviors persist due to dangerousness to self/others.   Pt has a disconnect between perceived abilities, actual abilities, and expectations.

## 2024-11-23 NOTE — PROGRESS NOTE ADULT - SUBJECTIVE AND OBJECTIVE BOX
PROGRESS NOTE:     Patient is a 19y old  Male who presents with a chief complaint of Placement (22 Nov 2024 09:26)      SUBJECTIVE / OVERNIGHT EVENTS: pt was seen adn evaluated today  he was downgraded to medical floor from ICU after having an aggression episode  anil is following and patient most likely patietn will need inpatient admission     ADDITIONAL REVIEW OF SYSTEMS:    MEDICATIONS  (STANDING):  desvenlafaxine ER 50 milliGRAM(s) Oral daily  lithium SR (LITHOBID) 300 milliGRAM(s) Oral at bedtime  lithium SR (LITHOBID) 600 milliGRAM(s) Oral daily  pantoprazole    Tablet 40 milliGRAM(s) Oral before breakfast  QUEtiapine 300 milliGRAM(s) Oral two times a day    MEDICATIONS  (PRN):  acetaminophen     Tablet .. 650 milliGRAM(s) Oral every 6 hours PRN Temp greater or equal to 38C (100.4F), Mild Pain (1 - 3)  aluminum hydroxide/magnesium hydroxide/simethicone Suspension 30 milliLiter(s) Oral every 4 hours PRN Dyspepsia  melatonin 3 milliGRAM(s) Oral at bedtime PRN Insomnia  ondansetron Injectable 4 milliGRAM(s) IV Push every 8 hours PRN Nausea and/or Vomiting  sodium chloride 0.65% Nasal 1 Spray(s) Both Nostrils daily PRN Nasal Congestion      CAPILLARY BLOOD GLUCOSE        I&O's Summary      PHYSICAL EXAM:  Vital Signs Last 24 Hrs  T(C): 36.4 (23 Nov 2024 05:00), Max: 36.7 (22 Nov 2024 21:17)  T(F): 97.6 (23 Nov 2024 05:00), Max: 98.1 (22 Nov 2024 21:17)  HR: 97 (23 Nov 2024 05:00) (86 - 129)  BP: 143/86 (23 Nov 2024 05:00) (117/74 - 143/86)  BP(mean): 97 (22 Nov 2024 15:00) (92 - 98)  RR: 17 (23 Nov 2024 05:00) (15 - 24)  SpO2: 96% (23 Nov 2024 05:00) (92% - 96%)    Parameters below as of 23 Nov 2024 05:00  Patient On (Oxygen Delivery Method): room air        CONSTITUTIONAL: NAD, well-developed  RESPIRATORY: Normal respiratory effort; lungs are clear to auscultation bilaterally  CARDIOVASCULAR: Regular rate and rhythm, normal S1 and S2, no murmur/rub/gallop; No lower extremity edema; Peripheral pulses are 2+ bilaterally  ABDOMEN: Nontender to palpation, normoactive bowel sounds, no rebound/guarding; No hepatosplenomegaly  MUSCLOSKELETAL: no clubbing or cyanosis of digits; no joint swelling or tenderness to palpation  PSYCH awake and alert     LABS:                        16.7   7.29  )-----------( 221      ( 23 Nov 2024 07:41 )             47.7     11-23    144  |  106  |  13  ----------------------------<  90  3.7   |  28  |  1.00    Ca    9.3      23 Nov 2024 07:41  Phos  4.0     11-23  Mg     2.1     11-23    TPro  7.1  /  Alb  4.1  /  TBili  1.5[H]  /  DBili  x   /  AST  29  /  ALT  33  /  AlkPhos  79  11-23          Urinalysis Basic - ( 23 Nov 2024 07:41 )    Color: x / Appearance: x / SG: x / pH: x  Gluc: 90 mg/dL / Ketone: x  / Bili: x / Urobili: x   Blood: x / Protein: x / Nitrite: x   Leuk Esterase: x / RBC: x / WBC x   Sq Epi: x / Non Sq Epi: x / Bacteria: x          RADIOLOGY & ADDITIONAL TESTS:  Results Reviewed:   Imaging Personally Reviewed:  Electrocardiogram Personally Reviewed:    COORDINATION OF CARE:  Care Discussed with Consultants/Other Providers [Y/N]:  Prior or Outpatient Records Reviewed [Y/N]:

## 2024-11-23 NOTE — BH CONSULTATION LIAISON PROGRESS NOTE - NSBHCONSULTRECOMMENDOTHER_PSY_A_CORE FT
Discussed with Dr. Kasper recommendations regarding medication:  Initial is to increase Quetiapine ( per MD pt with his profile may require suprathreshold doses) Will increase bedtime dose Quetiapine to 400 mg. Continue am dose 300 mg.   Can consider adding Propranolol ( of note pt is tachycardic). please get orthostatic BP.   Pristiq was added for anxiety and mental inflexibility, mild depression. Continue 50 mg.   Pt without history of tika/psychosis, but does have history of small trigger with disproportionate response- agitation/aggression.   Lithium therapeutic at 0.71 - doubt delirium as he has no other clinical signs of toxicity. Continue 900 mg total dose

## 2024-11-23 NOTE — PROGRESS NOTE ADULT - ASSESSMENT
Patient is a 18yo male with PMH of autism, ADHD, history of episodes of agitation and aggression brought in by EMS from home for agitation, [facility].    #Hyperactive Delirium  - Continue Lithium as per Psych, adjust dose per trough  - F/U AM lithium level  - C/w pristiq daily and seroquel BID  - EKG  Qtc 432, follow EKGs to monitor  - 4 point restraints discontinued patient calm w/o any combative/unsafe behavior   - C/w 1:1 observation for now   - PRN Ativan IVP for aggressive/violent behavior

## 2024-11-23 NOTE — BH CONSULTATION LIAISON PROGRESS NOTE - NSBHTIMEACTIVITIESPERFORMED_PSY_A_CORE
face to face time with patient, case discussion with his outpatient provider, chart review/lab interpretation, medication management, behavioral strategies with nursing staff.

## 2024-11-23 NOTE — BH CONSULTATION LIAISON PROGRESS NOTE - NSBHASSESSMENTFT_PSY_ALL_CORE
19M domiciled with mother and father, has PPHx of autism, ADHD, no past psychiatric hospitalizations, no past SAs, no past NSSIB; + hx of physical aggression (punching, kicking) toward parents when he is frustrated, police called and patient brought to [place] ED.   Pt seen by C/L service and pt has had minor adjustment of Lithium to therapeutic blood level. Pt with 2 -3 episodes of significant agitation and aggression.   Pt placed on 1:1 for his unpredictability/poor impulse control and frustration tolerance.

## 2024-11-23 NOTE — CHART NOTE - NSCHARTNOTEFT_GEN_A_CORE
Notified by RN Haydee that patient is requesting "stress medicine". Patient seen and examined at bedside. States that he feels fine and is a little tired.   Vitals: , Temp 97.6F, /85, O2 sat 96%.  Patient is otherwise asymptomatic.   EKG history with sinus tachycardia.  Ordered AM thyroid function tests.  Requested repeat vitals.  Will continue to monitor for now.

## 2024-11-24 LAB — TSH SERPL-MCNC: 1.98 UIU/ML — SIGNIFICANT CHANGE UP (ref 0.36–3.74)

## 2024-11-24 PROCEDURE — 99232 SBSQ HOSP IP/OBS MODERATE 35: CPT

## 2024-11-24 RX ADMIN — LITHIUM CARBONATE 600 MILLIGRAM(S): 300 CAPSULE ORAL at 13:55

## 2024-11-24 RX ADMIN — DESVENLAFAXINE SUCCINATE 50 MILLIGRAM(S): 50 TABLET, EXTENDED RELEASE ORAL at 13:55

## 2024-11-24 RX ADMIN — PANTOPRAZOLE SODIUM 40 MILLIGRAM(S): 40 TABLET, DELAYED RELEASE ORAL at 05:15

## 2024-11-24 RX ADMIN — LITHIUM CARBONATE 300 MILLIGRAM(S): 300 CAPSULE ORAL at 21:03

## 2024-11-24 RX ADMIN — Medication 300 MILLIGRAM(S): at 05:15

## 2024-11-24 RX ADMIN — Medication 300 MILLIGRAM(S): at 18:12

## 2024-11-24 NOTE — PROGRESS NOTE ADULT - ASSESSMENT
18yo male with PMH of autism, ADHD, history of episodes of agitation and aggression brought in by EMS from home for agitation.    *Agitation with hx of ADHD, autism  - pt with episodes of agitation, patient denies self-harm or SI, patient has been calm, downgraded to routine observation. No episodes of agitation last 48 hours  - Psychiatry recs appreciated, continue 1:1   - Continue Lithium 600 mg daily, Lithium 300mg qhs, further adjustment dose per psych  - continue Pristiq ER 50 mg daily , QUEtiapine 300 milliGRAM(s) Oral two times a day  - arrangements are being made for discharge, not returning to home, requires housing placement vs [facility] admission  - Appreciate Social Work involvement      20yo male with PMH of autism, ADHD, history of episodes of agitation and aggression brought in by EMS from home for agitation.    *Agitation with hx of ADHD, autism  - pt with episodes of agitation, patient denies self-harm or SI, patient has been calm, downgraded to routine observation. No episodes of agitation last 48 hours  - Psychiatry recs appreciated, continue 1:1   - Continue Lithium 600 mg daily, Lithium 300mg qhs, further adjustment dose per psych  - continue Pristiq ER 50 mg daily , QUEtiapine 300 milliGRAM(s) Oral two times a day  - arrangements are being made for discharge, not returning to home, requires housing placement vs [facility] admission  - Appreciate Social Work involvement       Patient parent called and updated regarding the patient condition

## 2024-11-24 NOTE — PROGRESS NOTE ADULT - SUBJECTIVE AND OBJECTIVE BOX
PROGRESS NOTE:     Patient is a 19y old  Male who presents with a chief complaint of Placement (23 Nov 2024 12:28)      SUBJECTIVE / OVERNIGHT EVENTS: pt was seen and evaluated today  he still remains 1:1 stable  no code greys overnight  appears comfortable- sharlenesch following considering inpatient admission     ADDITIONAL REVIEW OF SYSTEMS: as per HPI    MEDICATIONS  (STANDING):  desvenlafaxine ER 50 milliGRAM(s) Oral daily  lithium SR (LITHOBID) 300 milliGRAM(s) Oral at bedtime  lithium SR (LITHOBID) 600 milliGRAM(s) Oral daily  pantoprazole    Tablet 40 milliGRAM(s) Oral before breakfast  QUEtiapine 300 milliGRAM(s) Oral two times a day    MEDICATIONS  (PRN):  acetaminophen     Tablet .. 650 milliGRAM(s) Oral every 6 hours PRN Temp greater or equal to 38C (100.4F), Mild Pain (1 - 3)  aluminum hydroxide/magnesium hydroxide/simethicone Suspension 30 milliLiter(s) Oral every 4 hours PRN Dyspepsia  melatonin 3 milliGRAM(s) Oral at bedtime PRN Insomnia  ondansetron Injectable 4 milliGRAM(s) IV Push every 8 hours PRN Nausea and/or Vomiting  sodium chloride 0.65% Nasal 1 Spray(s) Both Nostrils daily PRN Nasal Congestion      CAPILLARY BLOOD GLUCOSE        I&O's Summary      PHYSICAL EXAM:  Vital Signs Last 24 Hrs  T(C): 36.4 (24 Nov 2024 05:00), Max: 36.6 (23 Nov 2024 13:57)  T(F): 97.5 (24 Nov 2024 05:00), Max: 97.9 (23 Nov 2024 13:57)  HR: 108 (24 Nov 2024 05:00) (108 - 144)  BP: 130/88 (24 Nov 2024 05:00) (128/87 - 136/94)  BP(mean): --  RR: 16 (24 Nov 2024 05:00) (16 - 20)  SpO2: 100% (24 Nov 2024 05:00) (94% - 100%)    Parameters below as of 24 Nov 2024 05:00  Patient On (Oxygen Delivery Method): room air        CONSTITUTIONAL: NAD, well-developed  RESPIRATORY: Normal respiratory effort; lungs are clear to auscultation bilaterally  CARDIOVASCULAR: Regular rate and rhythm, normal S1 and S2, no murmur/rub/gallop; No lower extremity edema; Peripheral pulses are 2+ bilaterally  ABDOMEN: Nontender to palpation, normoactive bowel sounds, no rebound/guarding; No hepatosplenomegaly  MUSCLOSKELETAL: no clubbing or cyanosis of digits; no joint swelling or tenderness to palpation  PSYCH: A+O to person, place,    LABS:                        16.7   7.29  )-----------( 221      ( 23 Nov 2024 07:41 )             47.7     11-23    144  |  106  |  13  ----------------------------<  90  3.7   |  28  |  1.00    Ca    9.3      23 Nov 2024 07:41  Phos  4.0     11-23  Mg     2.1     11-23    TPro  7.1  /  Alb  4.1  /  TBili  1.5[H]  /  DBili  x   /  AST  29  /  ALT  33  /  AlkPhos  79  11-23          Urinalysis Basic - ( 23 Nov 2024 07:41 )    Color: x / Appearance: x / SG: x / pH: x  Gluc: 90 mg/dL / Ketone: x  / Bili: x / Urobili: x   Blood: x / Protein: x / Nitrite: x   Leuk Esterase: x / RBC: x / WBC x   Sq Epi: x / Non Sq Epi: x / Bacteria: x          RADIOLOGY & ADDITIONAL TESTS:  Results Reviewed:   Imaging Personally Reviewed:  Electrocardiogram Personally Reviewed:    COORDINATION OF CARE:  Care Discussed with Consultants/Other Providers [Y/N]:  Prior or Outpatient Records Reviewed [Y/N]:

## 2024-11-25 PROCEDURE — 99233 SBSQ HOSP IP/OBS HIGH 50: CPT

## 2024-11-25 RX ADMIN — LITHIUM CARBONATE 300 MILLIGRAM(S): 300 CAPSULE ORAL at 21:18

## 2024-11-25 RX ADMIN — DESVENLAFAXINE SUCCINATE 50 MILLIGRAM(S): 50 TABLET, EXTENDED RELEASE ORAL at 11:42

## 2024-11-25 RX ADMIN — LITHIUM CARBONATE 600 MILLIGRAM(S): 300 CAPSULE ORAL at 11:41

## 2024-11-25 RX ADMIN — Medication 300 MILLIGRAM(S): at 17:59

## 2024-11-25 RX ADMIN — Medication 300 MILLIGRAM(S): at 05:27

## 2024-11-25 RX ADMIN — PANTOPRAZOLE SODIUM 40 MILLIGRAM(S): 40 TABLET, DELAYED RELEASE ORAL at 05:27

## 2024-11-25 NOTE — PROGRESS NOTE ADULT - ASSESSMENT
20yo male with PMH of autism, ADHD, history of episodes of agitation and aggression brought in by EMS from home for agitation.    *Agitation with hx of ADHD, autism  - pt with episodes of agitation, patient denies self-harm or SI, patient has been calm, downgraded to routine observation. No episodes of agitation last 48 hours  - Psychiatry recs appreciated, continue 1:1   - Continue Lithium 600 mg daily, Lithium 300mg qhs, further adjustment dose per psych  - continue Pristiq ER 50 mg daily , QUEtiapine 300 milliGRAM(s) Oral two times a day  - arrangements are being made for discharge, not returning to home, requires housing placement vs [facility] admission  - Appreciate Social Work involvement       Patient parent called and updated regarding the patient condition 11/25

## 2024-11-25 NOTE — PROGRESS NOTE ADULT - SUBJECTIVE AND OBJECTIVE BOX
PROGRESS NOTE:     Patient is a 19y old  Male who presents with a chief complaint of Placement (24 Nov 2024 09:30)      SUBJECTIVE / OVERNIGHT EVENTS: pt seen and evaluated   no overnight events  still on 1;1   pending safe dispo    ADDITIONAL REVIEW OF SYSTEMS:    MEDICATIONS  (STANDING):  desvenlafaxine ER 50 milliGRAM(s) Oral daily  lithium SR (LITHOBID) 600 milliGRAM(s) Oral daily  lithium SR (LITHOBID) 300 milliGRAM(s) Oral at bedtime  pantoprazole    Tablet 40 milliGRAM(s) Oral before breakfast  QUEtiapine 300 milliGRAM(s) Oral two times a day    MEDICATIONS  (PRN):  acetaminophen     Tablet .. 650 milliGRAM(s) Oral every 6 hours PRN Temp greater or equal to 38C (100.4F), Mild Pain (1 - 3)  aluminum hydroxide/magnesium hydroxide/simethicone Suspension 30 milliLiter(s) Oral every 4 hours PRN Dyspepsia  melatonin 3 milliGRAM(s) Oral at bedtime PRN Insomnia  ondansetron Injectable 4 milliGRAM(s) IV Push every 8 hours PRN Nausea and/or Vomiting  sodium chloride 0.65% Nasal 1 Spray(s) Both Nostrils daily PRN Nasal Congestion      CAPILLARY BLOOD GLUCOSE        I&O's Summary      PHYSICAL EXAM:  Vital Signs Last 24 Hrs  T(C): 36.6 (25 Nov 2024 05:28), Max: 36.7 (24 Nov 2024 19:00)  T(F): 97.9 (25 Nov 2024 05:28), Max: 98.1 (24 Nov 2024 19:00)  HR: 102 (25 Nov 2024 05:28) (102 - 123)  BP: 111/74 (25 Nov 2024 05:28) (111/74 - 145/87)  BP(mean): --  RR: 17 (25 Nov 2024 05:28) (17 - 17)  SpO2: 97% (25 Nov 2024 05:28) (96% - 97%)    Parameters below as of 25 Nov 2024 05:28  Patient On (Oxygen Delivery Method): room air        CONSTITUTIONAL: NAD, well-developed  RESPIRATORY: Normal respiratory effort; lungs are clear to auscultation bilaterally  CARDIOVASCULAR: Regular rate and rhythm, normal S1 and S2, no murmur/rub/gallop; No lower extremity edema; Peripheral pulses are 2+ bilaterally  ABDOMEN: Nontender to palpation, normoactive bowel sounds, no rebound/guarding; No hepatosplenomegaly  MUSCLOSKELETAL: no clubbing or cyanosis of digits; no joint swelling or tenderness to palpation  PSYCH: A+O to person, place  LABS:  n/a                    RADIOLOGY & ADDITIONAL TESTS:  Results Reviewed:   Imaging Personally Reviewed:  Electrocardiogram Personally Reviewed:    COORDINATION OF CARE:  Care Discussed with Consultants/Other Providers [Y/N]:  Prior or Outpatient Records Reviewed [Y/N]:

## 2024-11-26 PROCEDURE — 99232 SBSQ HOSP IP/OBS MODERATE 35: CPT

## 2024-11-26 RX ORDER — SODIUM CHLORIDE 0.65 %
1 AEROSOL, SPRAY (ML) NASAL DAILY
Refills: 0 | Status: DISCONTINUED | OUTPATIENT
Start: 2024-11-26 | End: 2024-12-13

## 2024-11-26 RX ORDER — LITHIUM CARBONATE 300 MG/1
600 CAPSULE ORAL DAILY
Refills: 0 | Status: DISCONTINUED | OUTPATIENT
Start: 2024-11-26 | End: 2024-12-10

## 2024-11-26 RX ORDER — ONDANSETRON HYDROCHLORIDE 4 MG/1
4 TABLET, FILM COATED ORAL EVERY 8 HOURS
Refills: 0 | Status: DISCONTINUED | OUTPATIENT
Start: 2024-11-26 | End: 2024-12-13

## 2024-11-26 RX ORDER — TUBERCULIN,PURIF.PROT.DERIV. 250/0.1ML
5 VIAL (ML) INTRADERMAL ONCE
Refills: 0 | Status: COMPLETED | OUTPATIENT
Start: 2024-11-26 | End: 2024-11-26

## 2024-11-26 RX ORDER — ACETAMINOPHEN, DIPHENHYDRAMINE HCL, PHENYLEPHRINE HCL 325; 25; 5 MG/1; MG/1; MG/1
3 TABLET ORAL AT BEDTIME
Refills: 0 | Status: DISCONTINUED | OUTPATIENT
Start: 2024-11-26 | End: 2024-12-13

## 2024-11-26 RX ORDER — ACETAMINOPHEN 500MG 500 MG/1
650 TABLET, COATED ORAL EVERY 6 HOURS
Refills: 0 | Status: DISCONTINUED | OUTPATIENT
Start: 2024-11-26 | End: 2024-12-13

## 2024-11-26 RX ORDER — MAGNESIUM, ALUMINUM HYDROXIDE 200-225/5
30 SUSPENSION, ORAL (FINAL DOSE FORM) ORAL EVERY 4 HOURS
Refills: 0 | Status: DISCONTINUED | OUTPATIENT
Start: 2024-11-26 | End: 2024-12-13

## 2024-11-26 RX ADMIN — Medication 400 MILLIGRAM(S): at 21:02

## 2024-11-26 RX ADMIN — LITHIUM CARBONATE 300 MILLIGRAM(S): 300 CAPSULE ORAL at 21:02

## 2024-11-26 RX ADMIN — LITHIUM CARBONATE 600 MILLIGRAM(S): 300 CAPSULE ORAL at 11:33

## 2024-11-26 RX ADMIN — PANTOPRAZOLE SODIUM 40 MILLIGRAM(S): 40 TABLET, DELAYED RELEASE ORAL at 06:14

## 2024-11-26 RX ADMIN — Medication 300 MILLIGRAM(S): at 06:14

## 2024-11-26 RX ADMIN — DESVENLAFAXINE SUCCINATE 50 MILLIGRAM(S): 50 TABLET, EXTENDED RELEASE ORAL at 11:33

## 2024-11-26 NOTE — PROGRESS NOTE ADULT - SUBJECTIVE AND OBJECTIVE BOX
PROGRESS NOTE:     Patient is a 19y old  Male who presents with a chief complaint of Placement (25 Nov 2024 11:55)      SUBJECTIVE / OVERNIGHT EVENTS: pt was seen and evaluated today   no complains offered     ADDITIONAL REVIEW OF SYSTEMS: as per HPI     MEDICATIONS  (STANDING):  desvenlafaxine ER 50 milliGRAM(s) Oral daily  lithium SR (LITHOBID) 300 milliGRAM(s) Oral at bedtime  lithium SR (LITHOBID) 600 milliGRAM(s) Oral daily  pantoprazole    Tablet 40 milliGRAM(s) Oral before breakfast  QUEtiapine 300 milliGRAM(s) Oral two times a day    MEDICATIONS  (PRN):  acetaminophen     Tablet .. 650 milliGRAM(s) Oral every 6 hours PRN Temp greater or equal to 38C (100.4F), Mild Pain (1 - 3)  aluminum hydroxide/magnesium hydroxide/simethicone Suspension 30 milliLiter(s) Oral every 4 hours PRN Dyspepsia  melatonin 3 milliGRAM(s) Oral at bedtime PRN Insomnia  ondansetron Injectable 4 milliGRAM(s) IV Push every 8 hours PRN Nausea and/or Vomiting  sodium chloride 0.65% Nasal 1 Spray(s) Both Nostrils daily PRN Nasal Congestion      CAPILLARY BLOOD GLUCOSE        I&O's Summary      PHYSICAL EXAM:  Vital Signs Last 24 Hrs  T(C): 37.1 (26 Nov 2024 05:00), Max: 37.1 (26 Nov 2024 05:00)  T(F): 98.8 (26 Nov 2024 05:00), Max: 98.8 (26 Nov 2024 05:00)  HR: 101 (26 Nov 2024 05:00) (101 - 101)  BP: 137/89 (26 Nov 2024 05:00) (137/89 - 137/89)  BP(mean): --  RR: 17 (26 Nov 2024 05:00) (17 - 17)  SpO2: 94% (26 Nov 2024 05:00) (94% - 94%)    Parameters below as of 26 Nov 2024 05:00  Patient On (Oxygen Delivery Method): room air          CONSTITUTIONAL: NAD, well-developed  RESPIRATORY: Normal respiratory effort; lungs are clear to auscultation bilaterally  CARDIOVASCULAR: Regular rate and rhythm, normal S1 and S2, no murmur/rub/gallop; No lower extremity edema; Peripheral pulses are 2+ bilaterally  ABDOMEN: Nontender to palpation, normoactive bowel sounds, no rebound/guarding; No hepatosplenomegaly  MUSCLOSKELETAL: no clubbing or cyanosis of digits; no joint swelling or tenderness to palpation  PSYCH: A+O to person, place    LABS:  n.a          case discussed during IDR and with consultants

## 2024-11-26 NOTE — PROGRESS NOTE ADULT - ASSESSMENT
18yo male with PMH of autism, ADHD, history of episodes of agitation and aggression brought in by EMS from home for agitation.    *Agitation with hx of ADHD, autism  - pt with episodes of agitation, patient denies self-harm or SI, patient has been calm, downgraded to routine observation. No episodes of agitation last 48 hours  - Psychiatry recs appreciated, continue 1:1   - Continue Lithium 600 mg daily, Lithium 300mg qhs, further adjustment dose per psych  - continue Pristiq ER 50 mg daily , QUEtiapine 300 milliGRAM(s) Oral two times a day  - arrangements are being made for discharge, not returning to home, requires housing placement vs [facility] admission  - Appreciate Social Work involvement       Patient parent called and updated regarding the patient condition 11/24

## 2024-11-27 PROCEDURE — 99233 SBSQ HOSP IP/OBS HIGH 50: CPT

## 2024-11-27 RX ADMIN — PANTOPRAZOLE SODIUM 40 MILLIGRAM(S): 40 TABLET, DELAYED RELEASE ORAL at 06:05

## 2024-11-27 RX ADMIN — Medication 300 MILLIGRAM(S): at 09:49

## 2024-11-27 RX ADMIN — Medication 400 MILLIGRAM(S): at 21:13

## 2024-11-27 RX ADMIN — LITHIUM CARBONATE 300 MILLIGRAM(S): 300 CAPSULE ORAL at 21:14

## 2024-11-27 RX ADMIN — LITHIUM CARBONATE 600 MILLIGRAM(S): 300 CAPSULE ORAL at 13:14

## 2024-11-27 NOTE — CHART NOTE - NSCHARTNOTEFT_GEN_A_CORE
NUTRITION Follow Up Note    SOURCE: Patient [X]  Medical Record [X]  Nursing Staff [X]  Family Member []    DIET: Diet, Regular (10-21-24 @ 12:13) [Active]    Patient noted with good appetite, consuming >75% of meals per nursing flowsheets. No issues with chewing/swallowing. Food preferences obtained and noted on patients file with nutrition office.     EDEMA: no edema noted    LAST BM: 2024    SKIN: no pressure injuries noted    WEIGHT TRENDS:  Weight in k.6 (2024 04:15)      PERTINENT MEDICATIONS: MEDICATIONS  (STANDING):  lithium SR (LITHOBID) 300 milliGRAM(s) Oral at bedtime  lithium SR (LITHOBID) 600 milliGRAM(s) Oral daily  pantoprazole    Tablet 40 milliGRAM(s) Oral before breakfast  PPD  5 Tuberculin Unit(s) Injectable 5 Unit(s) IntraDermal once  QUEtiapine 300 milliGRAM(s) Oral <User Schedule>  QUEtiapine 400 milliGRAM(s) Oral <User Schedule>    MEDICATIONS  (PRN):  acetaminophen     Tablet .. 650 milliGRAM(s) Oral every 6 hours PRN Temp greater or equal to 38C (100.4F), Mild Pain (1 - 3)  aluminum hydroxide/magnesium hydroxide/simethicone Suspension 30 milliLiter(s) Oral every 4 hours PRN Dyspepsia  melatonin 3 milliGRAM(s) Oral at bedtime PRN Insomnia  ondansetron Injectable 4 milliGRAM(s) IV Push every 8 hours PRN Nausea and/or Vomiting  sodium chloride 0.65% Nasal 1 Spray(s) Both Nostrils daily PRN Nasal Congestion      PERTINENT LABS:    Phos 4.0 mg/dL  Alb 4.1 g/dL        ESTIMATED NEEDS:   [X] No Change Since Previous Assessment    PREVIOUS NUTRITION DIAGNOSIS:  No active nutrition Dx at this present time    NUTRITION DIAGNOSIS IS [X] Ongoing     NEW NUTRITION DIAGNOSIS: [X] Not Applicable    INTERVENTIONS:   1. Recommend continue current nutrition plan of care as tolerated     MONITORING & EVALUATION:   1. Weights   2. PO intakes   3. Skin integrity   4. Tolerance to diet prescription   5. Labs & POCT  6. Follow up (per protocol)    Registered Dietitian/Nutritionist Remains Available.  Fermin Mensah RD, CDN    Contact: [phone] or via [place] TEAMS

## 2024-11-27 NOTE — PROGRESS NOTE ADULT - SUBJECTIVE AND OBJECTIVE BOX
PROGRESS NOTE:     Patient is a 19y old  Male who presents with a chief complaint of Placement (26 Nov 2024 08:41)      SUBJECTIVE / OVERNIGHT EVENTS: pt was seen and evaluated today  agreeble to shower  no chest pain or sob    ADDITIONAL REVIEW OF SYSTEMS: as per HPI    MEDICATIONS  (STANDING):  lithium SR (LITHOBID) 300 milliGRAM(s) Oral at bedtime  lithium SR (LITHOBID) 600 milliGRAM(s) Oral daily  pantoprazole    Tablet 40 milliGRAM(s) Oral before breakfast  PPD  5 Tuberculin Unit(s) Injectable 5 Unit(s) IntraDermal once  QUEtiapine 300 milliGRAM(s) Oral <User Schedule>  QUEtiapine 400 milliGRAM(s) Oral <User Schedule>    MEDICATIONS  (PRN):  acetaminophen     Tablet .. 650 milliGRAM(s) Oral every 6 hours PRN Temp greater or equal to 38C (100.4F), Mild Pain (1 - 3)  aluminum hydroxide/magnesium hydroxide/simethicone Suspension 30 milliLiter(s) Oral every 4 hours PRN Dyspepsia  melatonin 3 milliGRAM(s) Oral at bedtime PRN Insomnia  ondansetron Injectable 4 milliGRAM(s) IV Push every 8 hours PRN Nausea and/or Vomiting  sodium chloride 0.65% Nasal 1 Spray(s) Both Nostrils daily PRN Nasal Congestion      CAPILLARY BLOOD GLUCOSE        I&O's Summary    26 Nov 2024 07:01  -  27 Nov 2024 07:00  --------------------------------------------------------  IN: 0 mL / OUT: 1 mL / NET: -1 mL        PHYSICAL EXAM:  Vital Signs Last 24 Hrs  T(C): 36.4 (27 Nov 2024 04:39), Max: 36.4 (27 Nov 2024 04:39)  T(F): 97.5 (27 Nov 2024 04:39), Max: 97.5 (27 Nov 2024 04:39)  HR: 104 (27 Nov 2024 04:39) (104 - 104)  BP: 116/83 (27 Nov 2024 04:39) (116/83 - 116/83)  BP(mean): --  RR: 17 (27 Nov 2024 04:39) (17 - 17)  SpO2: 98% (27 Nov 2024 04:39) (98% - 98%)        CONSTITUTIONAL: NAD, well-developed  RESPIRATORY: Normal respiratory effort; lungs are clear to auscultation bilaterally  CARDIOVASCULAR: Regular rate and rhythm, normal S1 and S2, no murmur/rub/gallop; No lower extremity edema; Peripheral pulses are 2+ bilaterally  ABDOMEN: Nontender to palpation, normoactive bowel sounds, no rebound/guarding; No hepatosplenomegaly  MUSCLOSKELETAL: no clubbing or cyanosis of digits; no joint swelling or tenderness to palpation  PSYCH: A+O to person, place, and time; affect appropriate    LABS:      n/a                 RADIOLOGY & ADDITIONAL TESTS:  Results Reviewed:   Imaging Personally Reviewed:  Electrocardiogram Personally Reviewed:    COORDINATION OF CARE:  Care Discussed with Consultants/Other Providers [Y/N]:  Prior or Outpatient Records Reviewed [Y/N]:

## 2024-11-27 NOTE — PROGRESS NOTE ADULT - ASSESSMENT
18yo male with PMH of autism, ADHD, history of episodes of agitation and aggression brought in by EMS from home for agitation.    *Agitation with hx of ADHD, autism  - pt with episodes of agitation, patient denies self-harm or SI, patient has been calm, downgraded to routine observation. No episodes of agitation last 48 hours  - Psychiatry recs appreciated, continue 1:1   - Continue Lithium 600 mg daily, Lithium 300mg qhs, further adjustment dose per psych  - continue Pristiq ER 50 mg daily , QUEtiapine 300 milliGRAM(s) Oral two times a day  - arrangements are being made for discharge, not returning to home, requires housing placement vs [facility] admission  - Appreciate Social Work involvement       Patient parent called and updated regarding the patient condition 11/24

## 2024-11-28 PROCEDURE — 99232 SBSQ HOSP IP/OBS MODERATE 35: CPT

## 2024-11-28 RX ADMIN — Medication 400 MILLIGRAM(S): at 21:46

## 2024-11-28 RX ADMIN — LITHIUM CARBONATE 600 MILLIGRAM(S): 300 CAPSULE ORAL at 12:26

## 2024-11-28 RX ADMIN — LITHIUM CARBONATE 300 MILLIGRAM(S): 300 CAPSULE ORAL at 21:45

## 2024-11-28 RX ADMIN — PANTOPRAZOLE SODIUM 40 MILLIGRAM(S): 40 TABLET, DELAYED RELEASE ORAL at 06:11

## 2024-11-28 RX ADMIN — Medication 300 MILLIGRAM(S): at 08:07

## 2024-11-28 NOTE — PROGRESS NOTE ADULT - SUBJECTIVE AND OBJECTIVE BOX
Interval History  Patient seen and examined at bedside. No acute events noted.  Patient reports feeling well, pleasant/calm, denies any acute complaints.     ALLERGIES:  No Known Allergies    MEDICATIONS  (STANDING):  lithium SR (LITHOBID) 600 milliGRAM(s) Oral daily  lithium SR (LITHOBID) 300 milliGRAM(s) Oral at bedtime  pantoprazole    Tablet 40 milliGRAM(s) Oral before breakfast  PPD  5 Tuberculin Unit(s) Injectable 5 Unit(s) IntraDermal once  QUEtiapine 300 milliGRAM(s) Oral <User Schedule>  QUEtiapine 400 milliGRAM(s) Oral <User Schedule>    MEDICATIONS  (PRN):  acetaminophen     Tablet .. 650 milliGRAM(s) Oral every 6 hours PRN Temp greater or equal to 38C (100.4F), Mild Pain (1 - 3)  aluminum hydroxide/magnesium hydroxide/simethicone Suspension 30 milliLiter(s) Oral every 4 hours PRN Dyspepsia  melatonin 3 milliGRAM(s) Oral at bedtime PRN Insomnia  ondansetron Injectable 4 milliGRAM(s) IV Push every 8 hours PRN Nausea and/or Vomiting  sodium chloride 0.65% Nasal 1 Spray(s) Both Nostrils daily PRN Nasal Congestion    Vital Signs Last 24 Hrs  T(F): 98.3 (28 Nov 2024 12:29), Max: 98.3 (28 Nov 2024 05:40)  HR: 113 (28 Nov 2024 12:29) (98 - 113)  BP: 131/82 (28 Nov 2024 12:29) (131/82 - 149/96)  RR: 17 (28 Nov 2024 12:29) (17 - 18)  SpO2: 96% (28 Nov 2024 12:29) (96% - 96%)  I&O's Summary    28 Nov 2024 07:01  -  28 Nov 2024 17:44  --------------------------------------------------------  IN: 860 mL / OUT: 0 mL / NET: 860 mL    PHYSICAL EXAM:  GENERAL: NAD  HEENT: NCAT  CHEST/LUNG: Clear to percussion bilaterally; No rales, rhonchi, wheezing  HEART: Regular rate and rhythm; No murmurs  ABDOMEN: Soft, Nontender, Nondistended; Bowel sounds present  MUSCULOSKELETAL/EXTREMITIES:  2+ Peripheral Pulses, No LE edema  PSYCH: Appropriate affect  NEURO: Alert & Oriented    I personally reviewed the below data/images/labs:    LABS:     Consultant(s) Notes Reviewed:   Care Discused with Consultants/Other Providers:  Imaging Personally Reviewed:

## 2024-11-28 NOTE — PROGRESS NOTE ADULT - ASSESSMENT
19 year old male with PMH of autism, ADHD, history of episodes of agitation and aggression brought in by EMS from home for agitation.    *Agitation with hx of ADHD, autism  - pt with episodes of agitation, patient denies self-harm or SI, patient has been calm, downgraded to routine observation. No episodes of agitation. Off of 1:1  - Continue Lithium  - Continue seroquel  - Arrangements are being made for discharge, not returning to home, requires housing placement vs psych admission  - Psych following, Needs further psych safety assessment prior to discharge  - Appreciate Social Work involvement     *DVT PPx  -He has been ambulating    Dispo  -Pending placement

## 2024-11-29 PROCEDURE — 99232 SBSQ HOSP IP/OBS MODERATE 35: CPT

## 2024-11-29 RX ORDER — LORAZEPAM 2 MG/1
1 TABLET ORAL EVERY 6 HOURS
Refills: 0 | Status: DISCONTINUED | OUTPATIENT
Start: 2024-11-29 | End: 2024-12-03

## 2024-11-29 RX ORDER — ALPRAZOLAM 0.5 MG
0.5 TABLET ORAL EVERY 6 HOURS
Refills: 0 | Status: DISCONTINUED | OUTPATIENT
Start: 2024-11-29 | End: 2024-12-04

## 2024-11-29 RX ADMIN — LITHIUM CARBONATE 300 MILLIGRAM(S): 300 CAPSULE ORAL at 22:13

## 2024-11-29 RX ADMIN — Medication 0.5 MILLIGRAM(S): at 22:12

## 2024-11-29 RX ADMIN — Medication 0.5 MILLIGRAM(S): at 16:07

## 2024-11-29 RX ADMIN — Medication 400 MILLIGRAM(S): at 22:12

## 2024-11-29 RX ADMIN — Medication 300 MILLIGRAM(S): at 08:31

## 2024-11-29 RX ADMIN — PANTOPRAZOLE SODIUM 40 MILLIGRAM(S): 40 TABLET, DELAYED RELEASE ORAL at 05:12

## 2024-11-29 RX ADMIN — LITHIUM CARBONATE 600 MILLIGRAM(S): 300 CAPSULE ORAL at 12:05

## 2024-11-29 NOTE — PROGRESS NOTE ADULT - ASSESSMENT
19 year old male with PMH of autism, ADHD, history of episodes of agitation and aggression brought in by EMS from home for agitation.    *Agitation with hx of ADHD, autism  - pt with episodes of agitation, patient denies self-harm or SI, patient has been calm, downgraded to routine observation. No episodes of agitation. Off of 1:1  - Continue Lithium  - Continue seroquel  - Arrangements are being made for discharge, not returning to home, requires housing placement vs psych admission  - Psych following, Needs further psych safety assessment prior to discharge  - Appreciate Social Work involvement     *DVT PPx  -He has been ambulating    Dispo  -Pending placement  -QuantiFeron gold pending    19 year old male with PMH of autism, ADHD, history of episodes of agitation and aggression brought in by EMS from home for agitation.    *Agitation with hx of ADHD, autism  - pt with episodes of agitation, patient denies self-harm or SI, patient has been calm, downgraded to routine observation. No episodes of agitation. Off of 1:1  - Continue Lithium  - Continue Seroquel  - Added Xanax 0.5mg Q6H PRN anxiety/restlessness  - Added Ativan 1mg IM PRN for threatening behavior   - Arrangements are being made for discharge, not returning to home, requires housing placement vs psych admission  - Psych following, Needs further psych safety assessment prior to discharge  - Appreciate Social Work involvement     *DVT PPx  -He has been ambulating    Dispo  -Pending placement  -QuantiFeron gold pending     Updated patient's mother over the phone 11/29

## 2024-11-29 NOTE — BH CONSULTATION LIAISON PROGRESS NOTE - NSBHCONSULTRECOMMENDOTHER_PSY_A_CORE FT
Pristiq discontinued, monitor heart rate, may need to reinstate 25 mg if patient remains anxious.  Writer had discussed potential use of Propranolol if pt remains with elevated heart rate- may be useful for intermittent explosive disorder.   Agree with rehab/creative arts/pet therapy.   Awaiting response regarding escalation to service line.   If stay still expected to be lengthy would advise petitioning [facility] hab staff.

## 2024-11-29 NOTE — PROGRESS NOTE ADULT - SUBJECTIVE AND OBJECTIVE BOX
Interval History  Patient seen and examined at bedside. No acute events noted.  Patient denies any acute complaints.     ALLERGIES:  No Known Allergies    MEDICATIONS  (STANDING):  lithium SR (LITHOBID) 300 milliGRAM(s) Oral at bedtime  lithium SR (LITHOBID) 600 milliGRAM(s) Oral daily  pantoprazole    Tablet 40 milliGRAM(s) Oral before breakfast  PPD  5 Tuberculin Unit(s) Injectable 5 Unit(s) IntraDermal once  QUEtiapine 300 milliGRAM(s) Oral <User Schedule>  QUEtiapine 400 milliGRAM(s) Oral <User Schedule>    MEDICATIONS  (PRN):  acetaminophen     Tablet .. 650 milliGRAM(s) Oral every 6 hours PRN Temp greater or equal to 38C (100.4F), Mild Pain (1 - 3)  aluminum hydroxide/magnesium hydroxide/simethicone Suspension 30 milliLiter(s) Oral every 4 hours PRN Dyspepsia  melatonin 3 milliGRAM(s) Oral at bedtime PRN Insomnia  ondansetron Injectable 4 milliGRAM(s) IV Push every 8 hours PRN Nausea and/or Vomiting  sodium chloride 0.65% Nasal 1 Spray(s) Both Nostrils daily PRN Nasal Congestion    Vital Signs Last 24 Hrs  T(F): 97.5 (29 Nov 2024 05:00), Max: 98 (28 Nov 2024 19:18)  HR: 107 (29 Nov 2024 05:00) (107 - 122)  BP: 119/78 (29 Nov 2024 05:00) (119/78 - 143/87)  RR: 18 (29 Nov 2024 05:00) (18 - 19)  SpO2: 96% (29 Nov 2024 05:00) (96% - 96%)  I&O's Summary    28 Nov 2024 07:01  -  29 Nov 2024 07:00  --------------------------------------------------------  IN: 860 mL / OUT: 0 mL / NET: 860 mL    PHYSICAL EXAM:  GENERAL: NAD  HEENT: NCAT  CHEST/LUNG: Clear to percussion bilaterally; No rales, rhonchi, wheezing  HEART: Regular rate and rhythm; No murmurs  ABDOMEN: Soft, Nontender, Nondistended; Bowel sounds present  MUSCULOSKELETAL/EXTREMITIES:  2+ Peripheral Pulses, No LE edema  PSYCH: Appropriate affect  NEURO: Alert & Oriented    I personally reviewed the below data/images/labs:    LABS:      Consultant(s) Notes Reviewed:   Care Discused with Consultants/Other Providers:  Imaging Personally Reviewed:

## 2024-11-30 PROCEDURE — 99232 SBSQ HOSP IP/OBS MODERATE 35: CPT

## 2024-11-30 RX ADMIN — Medication 400 MILLIGRAM(S): at 19:32

## 2024-11-30 RX ADMIN — LITHIUM CARBONATE 300 MILLIGRAM(S): 300 CAPSULE ORAL at 21:00

## 2024-11-30 RX ADMIN — Medication 0.5 MILLIGRAM(S): at 13:10

## 2024-11-30 RX ADMIN — LITHIUM CARBONATE 600 MILLIGRAM(S): 300 CAPSULE ORAL at 13:10

## 2024-11-30 RX ADMIN — Medication 300 MILLIGRAM(S): at 08:19

## 2024-11-30 RX ADMIN — Medication 0.5 MILLIGRAM(S): at 20:03

## 2024-11-30 RX ADMIN — Medication 0.5 MILLIGRAM(S): at 05:54

## 2024-11-30 RX ADMIN — PANTOPRAZOLE SODIUM 40 MILLIGRAM(S): 40 TABLET, DELAYED RELEASE ORAL at 05:55

## 2024-11-30 NOTE — BH CONSULTATION LIAISON PROGRESS NOTE - NSBHCONSULTRECOMMENDOTHER_PSY_A_CORE FT
Continue Lithium  Continue Quetiapine.     Pt had been only on Risperdal and Adderall, pt had been on Depakote, as well as Abilify which was the first medication he was on.   Pt had been seen by neurology and work up for seizure negative.   Per mother developmental pediatrician who is last clinician started current regimen, which has been the most effective in stopping agitation which typically starts with breaking property and escalating to physical aggression. Pristiq was started due to depression/anxiety ( per mother pt had a rash on his hands due to anxiety and nervousness). Dr. [facility] [phone].

## 2024-11-30 NOTE — PROGRESS NOTE ADULT - SUBJECTIVE AND OBJECTIVE BOX
Interval History  Patient seen and examined at bedside. No acute events noted.  Patient denies any acute complaints, calm/cooperative.     ALLERGIES:  No Known Allergies    MEDICATIONS  (STANDING):  lithium SR (LITHOBID) 300 milliGRAM(s) Oral at bedtime  lithium SR (LITHOBID) 600 milliGRAM(s) Oral daily  pantoprazole    Tablet 40 milliGRAM(s) Oral before breakfast  PPD  5 Tuberculin Unit(s) Injectable 5 Unit(s) IntraDermal once  QUEtiapine 300 milliGRAM(s) Oral <User Schedule>  QUEtiapine 400 milliGRAM(s) Oral <User Schedule>    MEDICATIONS  (PRN):  acetaminophen     Tablet .. 650 milliGRAM(s) Oral every 6 hours PRN Temp greater or equal to 38C (100.4F), Mild Pain (1 - 3)  ALPRAZolam 0.5 milliGRAM(s) Oral every 6 hours PRN anxiety/restlessness.  aluminum hydroxide/magnesium hydroxide/simethicone Suspension 30 milliLiter(s) Oral every 4 hours PRN Dyspepsia  LORazepam   Injectable 1 milliGRAM(s) IntraMuscular every 6 hours PRN Threatening behavior  melatonin 3 milliGRAM(s) Oral at bedtime PRN Insomnia  ondansetron Injectable 4 milliGRAM(s) IV Push every 8 hours PRN Nausea and/or Vomiting  sodium chloride 0.65% Nasal 1 Spray(s) Both Nostrils daily PRN Nasal Congestion    Vital Signs Last 24 Hrs  T(F): 97.3 (30 Nov 2024 13:05), Max: 97.4 (30 Nov 2024 05:30)  HR: 100 (30 Nov 2024 13:05) (91 - 134)  BP: 126/87 (30 Nov 2024 13:05) (105/69 - 151/94)  RR: 16 (30 Nov 2024 13:05) (16 - 18)  SpO2: 98% (30 Nov 2024 13:05) (97% - 98%)  I&O's Summary    29 Nov 2024 07:01  -  30 Nov 2024 07:00  --------------------------------------------------------  IN: 720 mL / OUT: 0 mL / NET: 720 mL    PHYSICAL EXAM:  GENERAL: NAD  HEENT: NCAT  CHEST/LUNG: Clear to percussion bilaterally; No rales, rhonchi, wheezing  HEART: Regular rate and rhythm; No murmurs  ABDOMEN: Soft, Nontender, Nondistended; Bowel sounds present  MUSCULOSKELETAL/EXTREMITIES:  2+ Peripheral Pulses, No LE edema  PSYCH: Appropriate affect  NEURO: Alert & Oriented    I personally reviewed the below data/images/labs:    LABS:    Consultant(s) Notes Reviewed:   Care Discused with Consultants/Other Providers:  Imaging Personally Reviewed:

## 2024-11-30 NOTE — PROGRESS NOTE ADULT - ASSESSMENT
19 year old male with PMH of autism, ADHD, history of episodes of agitation and aggression brought in by EMS from home for agitation.    *Agitation with hx of ADHD, autism  - pt with episodes of agitation, patient denies self-harm or SI, patient has been calm, downgraded to routine observation. No episodes of agitation. Off of 1:1  - Continue Lithium  - Continue Seroquel  - Continue Xanax 0.5mg Q6H PRN anxiety/restlessness  - Continue Ativan 1mg IM PRN for threatening behavior   - Arrangements are being made for discharge, not returning to home, requires housing placement vs psych admission  - Psych following  - Appreciate Social Work involvement     *DVT PPx  -He has been ambulating    Dispo  -Pending placement  -QuantiFeron gold pending     Updated patient's mother over the phone 11/29

## 2024-11-30 NOTE — BH CONSULTATION LIAISON PROGRESS NOTE - NSBHCONSULTPRIMARYDISCUSSYES_PSY_A_CORE FT
Pt has had reasonable medication adjustments as well as very supportive staff and add on of recreation therapy and pet therapy when available.   Will reassess mental status.

## 2024-12-01 PROCEDURE — 99232 SBSQ HOSP IP/OBS MODERATE 35: CPT

## 2024-12-01 RX ADMIN — PANTOPRAZOLE SODIUM 40 MILLIGRAM(S): 40 TABLET, DELAYED RELEASE ORAL at 05:11

## 2024-12-01 RX ADMIN — LITHIUM CARBONATE 300 MILLIGRAM(S): 300 CAPSULE ORAL at 21:09

## 2024-12-01 RX ADMIN — Medication 0.5 MILLIGRAM(S): at 08:20

## 2024-12-01 RX ADMIN — Medication 300 MILLIGRAM(S): at 07:46

## 2024-12-01 RX ADMIN — LITHIUM CARBONATE 600 MILLIGRAM(S): 300 CAPSULE ORAL at 11:03

## 2024-12-01 RX ADMIN — Medication 400 MILLIGRAM(S): at 19:11

## 2024-12-01 NOTE — PROGRESS NOTE ADULT - SUBJECTIVE AND OBJECTIVE BOX
Interval History  Patient seen and examined at bedside. No acute events noted.  Patient denies any acute complaints.     ALLERGIES:  No Known Allergies    MEDICATIONS  (STANDING):  lithium SR (LITHOBID) 300 milliGRAM(s) Oral at bedtime  lithium SR (LITHOBID) 600 milliGRAM(s) Oral daily  pantoprazole    Tablet 40 milliGRAM(s) Oral before breakfast  QUEtiapine 300 milliGRAM(s) Oral <User Schedule>  QUEtiapine 400 milliGRAM(s) Oral <User Schedule>    MEDICATIONS  (PRN):  acetaminophen     Tablet .. 650 milliGRAM(s) Oral every 6 hours PRN Temp greater or equal to 38C (100.4F), Mild Pain (1 - 3)  ALPRAZolam 0.5 milliGRAM(s) Oral every 6 hours PRN anxiety/restlessness.  aluminum hydroxide/magnesium hydroxide/simethicone Suspension 30 milliLiter(s) Oral every 4 hours PRN Dyspepsia  LORazepam   Injectable 1 milliGRAM(s) IntraMuscular every 6 hours PRN Threatening behavior  melatonin 3 milliGRAM(s) Oral at bedtime PRN Insomnia  ondansetron Injectable 4 milliGRAM(s) IV Push every 8 hours PRN Nausea and/or Vomiting  sodium chloride 0.65% Nasal 1 Spray(s) Both Nostrils daily PRN Nasal Congestion    Vital Signs Last 24 Hrs  T(F): 97.5 (01 Dec 2024 13:13), Max: 97.8 (30 Nov 2024 19:08)  HR: 125 (01 Dec 2024 13:13) (98 - 125)  BP: 133/80 (01 Dec 2024 13:13) (130/78 - 133/80)  RR: 16 (01 Dec 2024 13:13) (16 - 16)  SpO2: 96% (01 Dec 2024 13:13) (96% - 97%)  I&O's Summary    30 Nov 2024 07:01  -  01 Dec 2024 07:00  --------------------------------------------------------  IN: 720 mL / OUT: 0 mL / NET: 720 mL    PHYSICAL EXAM:  GENERAL: NAD  HEENT: NCAT  CHEST/LUNG: Clear to percussion bilaterally; No rales, rhonchi, wheezing  HEART: Regular rate and rhythm; No murmurs  ABDOMEN: Soft, Nontender, Nondistended; Bowel sounds present  MUSCULOSKELETAL/EXTREMITIES:  2+ Peripheral Pulses, No LE edema  PSYCH: Appropriate affect  NEURO: Alert & Oriented    I personally reviewed the below data/images/labs:    LABS:      Consultant(s) Notes Reviewed:   Care Discused with Consultants/Other Providers:  Imaging Personally Reviewed:

## 2024-12-02 LAB
GAMMA INTERFERON BACKGROUND BLD IA-ACNC: 0.04 IU/ML — SIGNIFICANT CHANGE UP
M TB IFN-G BLD-IMP: NEGATIVE — SIGNIFICANT CHANGE UP
M TB IFN-G CD4+ BCKGRND COR BLD-ACNC: 0.03 IU/ML — SIGNIFICANT CHANGE UP
M TB IFN-G CD4+CD8+ BCKGRND COR BLD-ACNC: 0 IU/ML — SIGNIFICANT CHANGE UP
QUANT TB PLUS MITOGEN MINUS NIL: >10 IU/ML — SIGNIFICANT CHANGE UP

## 2024-12-02 PROCEDURE — 99232 SBSQ HOSP IP/OBS MODERATE 35: CPT

## 2024-12-02 RX ADMIN — Medication 300 MILLIGRAM(S): at 08:31

## 2024-12-02 RX ADMIN — LITHIUM CARBONATE 600 MILLIGRAM(S): 300 CAPSULE ORAL at 12:43

## 2024-12-02 RX ADMIN — LITHIUM CARBONATE 300 MILLIGRAM(S): 300 CAPSULE ORAL at 21:20

## 2024-12-02 RX ADMIN — Medication 0.5 MILLIGRAM(S): at 12:43

## 2024-12-02 RX ADMIN — PANTOPRAZOLE SODIUM 40 MILLIGRAM(S): 40 TABLET, DELAYED RELEASE ORAL at 05:04

## 2024-12-02 RX ADMIN — Medication 400 MILLIGRAM(S): at 21:21

## 2024-12-02 RX ADMIN — Medication 0.5 MILLIGRAM(S): at 21:21

## 2024-12-02 NOTE — PROGRESS NOTE ADULT - SUBJECTIVE AND OBJECTIVE BOX
Interval History  Patient seen and examined at bedside. No acute events noted.  Patient denies any acute complaints. Feels well.    ALLERGIES:  No Known Allergies    MEDICATIONS  (STANDING):  lithium SR (LITHOBID) 300 milliGRAM(s) Oral at bedtime  lithium SR (LITHOBID) 600 milliGRAM(s) Oral daily  pantoprazole    Tablet 40 milliGRAM(s) Oral before breakfast  QUEtiapine 300 milliGRAM(s) Oral <User Schedule>  QUEtiapine 400 milliGRAM(s) Oral <User Schedule>    MEDICATIONS  (PRN):  acetaminophen     Tablet .. 650 milliGRAM(s) Oral every 6 hours PRN Temp greater or equal to 38C (100.4F), Mild Pain (1 - 3)  ALPRAZolam 0.5 milliGRAM(s) Oral every 6 hours PRN anxiety/restlessness.  aluminum hydroxide/magnesium hydroxide/simethicone Suspension 30 milliLiter(s) Oral every 4 hours PRN Dyspepsia  LORazepam   Injectable 1 milliGRAM(s) IntraMuscular every 6 hours PRN Threatening behavior  melatonin 3 milliGRAM(s) Oral at bedtime PRN Insomnia  ondansetron Injectable 4 milliGRAM(s) IV Push every 8 hours PRN Nausea and/or Vomiting  sodium chloride 0.65% Nasal 1 Spray(s) Both Nostrils daily PRN Nasal Congestion    Vital Signs Last 24 Hrs  T(F): 98.5 (02 Dec 2024 12:18), Max: 98.5 (02 Dec 2024 12:18)  HR: 142 (02 Dec 2024 12:18) (94 - 142)  BP: 123/77 (02 Dec 2024 12:18) (121/76 - 133/79)  RR: 17 (02 Dec 2024 12:18) (16 - 17)  SpO2: 96% (02 Dec 2024 12:18) (96% - 98%)  I&O's Summary    01 Dec 2024 07:01  -  02 Dec 2024 07:00  --------------------------------------------------------  IN: 720 mL / OUT: 0 mL / NET: 720 mL    02 Dec 2024 07:01  -  02 Dec 2024 15:26  --------------------------------------------------------  IN: 820 mL / OUT: 0 mL / NET: 820 mL    PHYSICAL EXAM:  GENERAL: NAD  HEENT: NCAT  CHEST/LUNG: Clear to percussion bilaterally; No rales, rhonchi, wheezing  HEART: Regular rate and rhythm; No murmurs  ABDOMEN: Soft, Nontender, Nondistended; Bowel sounds present  MUSCULOSKELETAL/EXTREMITIES:  2+ Peripheral Pulses, No LE edema  PSYCH: Appropriate affect  NEURO: Alert & Oriented    I personally reviewed the below data/images/labs:    LABS:    Consultant(s) Notes Reviewed:   Care Discused with Consultants/Other Providers:  Imaging Personally Reviewed:

## 2024-12-03 PROCEDURE — 99232 SBSQ HOSP IP/OBS MODERATE 35: CPT

## 2024-12-03 RX ADMIN — PANTOPRAZOLE SODIUM 40 MILLIGRAM(S): 40 TABLET, DELAYED RELEASE ORAL at 05:09

## 2024-12-03 RX ADMIN — Medication 0.5 MILLIGRAM(S): at 05:10

## 2024-12-03 RX ADMIN — Medication 400 MILLIGRAM(S): at 21:23

## 2024-12-03 RX ADMIN — Medication 0.5 MILLIGRAM(S): at 21:23

## 2024-12-03 RX ADMIN — LITHIUM CARBONATE 300 MILLIGRAM(S): 300 CAPSULE ORAL at 21:23

## 2024-12-03 RX ADMIN — LORAZEPAM 1 MILLIGRAM(S): 2 TABLET ORAL at 10:29

## 2024-12-03 RX ADMIN — LITHIUM CARBONATE 600 MILLIGRAM(S): 300 CAPSULE ORAL at 11:43

## 2024-12-03 RX ADMIN — Medication 300 MILLIGRAM(S): at 08:30

## 2024-12-03 NOTE — BH CONSULTATION LIAISON PROGRESS NOTE - NSBHATTESTAPPBILLTIME_PSY_A_CORE
I attest my time as VASU is greater than 50% of the total combined time spent on qualifying patient care activities. I have reviewed and verified the documentation.

## 2024-12-03 NOTE — BH CONSULTATION LIAISON PROGRESS NOTE - ADDITIONAL PSYCHIATRIC MEDICATIONS
Current medication regimen:  Lithium 600mg daily  Lithium 300mg qhs  Seroquel 300mg @ 0800  Seroquel 400mg qhs

## 2024-12-03 NOTE — PROGRESS NOTE ADULT - ASSESSMENT
19 year old male with PMH of autism, ADHD, history of episodes of agitation and aggression brought in by EMS from home for agitation.    *Agitation/Agression with hx of ADHD, autism  - Continue 1:1   - Continue Lithium  - Continue Seroquel  - Continue Xanax 0.5mg Q6H PRN anxiety/restlessness  - Continue Ativan 1mg IM PRN for threatening behavior   - Patient with ongoing behavioral disturbances with agitation, aggressiveness and combativeness, he is a danger to himself and others  - Psych following, recommended inpatient psychiatric care, 2PC done   - Appreciate Social Work involvement     *DVT PPx  -He has been ambulating    Dispo  -Pending placement  -QuantiFeron gold negative 11/29
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
## 2024-12-03 NOTE — BH CONSULTATION LIAISON PROGRESS NOTE - NSBHCONSULTRECOMMENDOTHER_PSY_A_CORE FT
- Continue with Lithium 600mg daily; 300mg qhs.  - Continue with Seroquel 300mg daily; 400mg qhs.  - Patient with ongoing behavioral disturbances characterized by agitation, aggressiveness, and combativeness - which have persisted despite medication adjustments. Given the frequency and severity of these episodes throughout his hospitalization, recommend inpatient psychiatric care.
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
## 2024-12-04 PROCEDURE — 99232 SBSQ HOSP IP/OBS MODERATE 35: CPT

## 2024-12-04 RX ORDER — CLONAZEPAM 0.12 MG/1
0.5 TABLET, ORALLY DISINTEGRATING ORAL
Refills: 0 | Status: DISCONTINUED | OUTPATIENT
Start: 2024-12-04 | End: 2024-12-07

## 2024-12-04 RX ORDER — LITHIUM CARBONATE 300 MG/1
2 CAPSULE ORAL
Qty: 60 | Refills: 0
Start: 2024-12-04 | End: 2025-01-02

## 2024-12-04 RX ORDER — CLONAZEPAM 0.12 MG/1
1 TABLET, ORALLY DISINTEGRATING ORAL
Qty: 30 | Refills: 0
Start: 2024-12-04 | End: 2025-01-02

## 2024-12-04 RX ORDER — LITHIUM CARBONATE 300 MG/1
1 CAPSULE ORAL
Qty: 30 | Refills: 0
Start: 2024-12-04 | End: 2025-01-02

## 2024-12-04 RX ORDER — ALPRAZOLAM 0.5 MG
1 TABLET ORAL
Qty: 90 | Refills: 0
Start: 2024-12-04 | End: 2025-01-02

## 2024-12-04 RX ORDER — PANTOPRAZOLE SODIUM 40 MG/1
1 TABLET, DELAYED RELEASE ORAL
Qty: 30 | Refills: 0
Start: 2024-12-04 | End: 2025-01-02

## 2024-12-04 RX ORDER — ALPRAZOLAM 0.5 MG
0.5 TABLET ORAL EVERY 8 HOURS
Refills: 0 | Status: DISCONTINUED | OUTPATIENT
Start: 2024-12-04 | End: 2024-12-11

## 2024-12-04 RX ADMIN — Medication 300 MILLIGRAM(S): at 08:00

## 2024-12-04 RX ADMIN — PANTOPRAZOLE SODIUM 40 MILLIGRAM(S): 40 TABLET, DELAYED RELEASE ORAL at 06:19

## 2024-12-04 RX ADMIN — Medication 0.5 MILLIGRAM(S): at 06:20

## 2024-12-04 RX ADMIN — Medication 0.5 MILLIGRAM(S): at 12:16

## 2024-12-04 RX ADMIN — LITHIUM CARBONATE 300 MILLIGRAM(S): 300 CAPSULE ORAL at 21:10

## 2024-12-04 RX ADMIN — LITHIUM CARBONATE 600 MILLIGRAM(S): 300 CAPSULE ORAL at 12:15

## 2024-12-04 RX ADMIN — Medication 400 MILLIGRAM(S): at 21:09

## 2024-12-04 RX ADMIN — Medication 0.5 MILLIGRAM(S): at 21:08

## 2024-12-04 NOTE — BH CONSULTATION LIAISON PROGRESS NOTE - NSBHCONSULTRECOMMENDOTHER_PSY_A_CORE FT
- Continue with Lithium 600mg daily; 300mg qhs.  - Continue with Seroquel 300mg daily; 400mg qhs.  - Discussed case at length with  and psychiatric attending. Updated medication list requested by [facility] was sent over by social work and patient could have placement with crisis housing before the end of the week (could not provide actual date). [facility] reports that they can accommodate patient's behavioral concerns.
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
## 2024-12-04 NOTE — PROGRESS NOTE ADULT - ASSESSMENT
19 year old male with PMH of autism, ADHD, history of episodes of agitation and aggression brought in by EMS from home for agitation.    *Agitation/Agression with hx of ADHD, autism  - Continue 1:1   - Continue Lithium  - Continue Seroquel  - Added Klonopin 0.5mg at 6PM   - Continue Xanax 0.5mg but change to Q8H PRN anxiety/restlessness  - Continue Ativan 1mg IM PRN for threatening behavior   - Psych following, pending acceptance to [facility]/crisis housing  - Appreciate Social Work involvement     *DVT PPx  -He has been ambulating    Dispo  -Pending placement  -QuantiFeron gold negative 11/29    Patient's mother was updated 12/4

## 2024-12-04 NOTE — PROGRESS NOTE ADULT - SUBJECTIVE AND OBJECTIVE BOX
Interval History  Patient seen and examined at bedside. No acute events noted  Patient is calm this morning, asking when he can leave the hospital.  Remains on 1:1    ALLERGIES:  No Known Allergies    MEDICATIONS  (STANDING):  clonazePAM  Tablet 0.5 milliGRAM(s) Oral <User Schedule>  lithium SR (LITHOBID) 300 milliGRAM(s) Oral at bedtime  lithium SR (LITHOBID) 600 milliGRAM(s) Oral daily  pantoprazole    Tablet 40 milliGRAM(s) Oral before breakfast  QUEtiapine 300 milliGRAM(s) Oral <User Schedule>  QUEtiapine 400 milliGRAM(s) Oral <User Schedule>    MEDICATIONS  (PRN):  acetaminophen     Tablet .. 650 milliGRAM(s) Oral every 6 hours PRN Temp greater or equal to 38C (100.4F), Mild Pain (1 - 3)  ALPRAZolam 0.5 milliGRAM(s) Oral every 8 hours PRN anxiety/restlessness.  aluminum hydroxide/magnesium hydroxide/simethicone Suspension 30 milliLiter(s) Oral every 4 hours PRN Dyspepsia  LORazepam   Injectable 1 milliGRAM(s) IntraMuscular every 6 hours PRN Threatening behavior  melatonin 3 milliGRAM(s) Oral at bedtime PRN Insomnia  ondansetron Injectable 4 milliGRAM(s) IV Push every 8 hours PRN Nausea and/or Vomiting  sodium chloride 0.65% Nasal 1 Spray(s) Both Nostrils daily PRN Nasal Congestion    Vital Signs Last 24 Hrs  T(F): 98.1 (04 Dec 2024 06:22), Max: 98.1 (04 Dec 2024 06:22)  HR: 97 (04 Dec 2024 06:22) (97 - 108)  BP: 115/77 (04 Dec 2024 06:22) (115/77 - 129/77)  RR: 16 (04 Dec 2024 06:22) (16 - 18)  SpO2: 94% (04 Dec 2024 06:22) (94% - 98%)  I&O's Summary    PHYSICAL EXAM:  GENERAL: NAD  HEENT: NCAT  CHEST/LUNG: Clear to percussion bilaterally; No rales, rhonchi, wheezing  HEART: Regular rate and rhythm; No murmurs  ABDOMEN: Soft, Nontender, Nondistended; Bowel sounds present  MUSCULOSKELETAL/EXTREMITIES:  2+ Peripheral Pulses, No LE edema  PSYCH: Appropriate affect  NEURO: Alert & Oriented    I personally reviewed the below data/images/labs:    LABS:    Consultant(s) Notes Reviewed:   Care Discused with Consultants/Other Providers:  Imaging Personally Reviewed:

## 2024-12-04 NOTE — CHART NOTE - NSCHARTNOTEFT_GEN_A_CORE
NUTRITION Follow Up Note    SOURCE: Patient [X]  Medical Record [X]  Nursing Staff [X]  Family Member []    DIET: Diet, Regular (10-21-24 @ 12:13) [Active]    Patient with failry good appetite, consuming % of meals per nursing flowsheets. Denies any chewing/swallowing difficulties. Food preferences obtained and noted on patients file with nutrition office.     EDEMA: no edema noted    LAST BM: 03-Dec-2024    SKIN: no pressure injuries noted    WEIGHT TRENDS: 93 kG (10/20/24)      PERTINENT MEDICATIONS: MEDICATIONS  (STANDING):  lithium SR (LITHOBID) 300 milliGRAM(s) Oral at bedtime  lithium SR (LITHOBID) 600 milliGRAM(s) Oral daily  pantoprazole    Tablet 40 milliGRAM(s) Oral before breakfast  QUEtiapine 300 milliGRAM(s) Oral <User Schedule>  QUEtiapine 400 milliGRAM(s) Oral <User Schedule>    MEDICATIONS  (PRN):  acetaminophen     Tablet .. 650 milliGRAM(s) Oral every 6 hours PRN Temp greater or equal to 38C (100.4F), Mild Pain (1 - 3)  ALPRAZolam 0.5 milliGRAM(s) Oral every 6 hours PRN anxiety/restlessness.  aluminum hydroxide/magnesium hydroxide/simethicone Suspension 30 milliLiter(s) Oral every 4 hours PRN Dyspepsia  LORazepam   Injectable 1 milliGRAM(s) IntraMuscular every 6 hours PRN Threatening behavior  melatonin 3 milliGRAM(s) Oral at bedtime PRN Insomnia  ondansetron Injectable 4 milliGRAM(s) IV Push every 8 hours PRN Nausea and/or Vomiting  sodium chloride 0.65% Nasal 1 Spray(s) Both Nostrils daily PRN Nasal Congestion      PERTINENT LABS:          ESTIMATED NEEDS:   [X] No Change Since Previous Assessment    PREVIOUS NUTRITION DIAGNOSIS:  No active nutrition Dx at this present time    NUTRITION DIAGNOSIS IS [X] Ongoing     NEW NUTRITION DIAGNOSIS: [X] Not Applicable    INTERVENTIONS:   1. Recommend continue current nutrition plan of care as tolerated     MONITORING & EVALUATION:   1. Weights   2. PO intakes   3. Skin integrity   4. Tolerance to diet prescription   5. Labs & POCT  6. Follow up (per protocol)    Registered Dietitian/Nutritionist Remains Available.  Fermin Mensah RD, CDN    Contact: [phone] or via [facility] TEAMS

## 2024-12-05 PROCEDURE — 99232 SBSQ HOSP IP/OBS MODERATE 35: CPT

## 2024-12-05 RX ORDER — LORAZEPAM 2 MG/1
1 TABLET ORAL ONCE
Refills: 0 | Status: DISCONTINUED | OUTPATIENT
Start: 2024-12-05 | End: 2024-12-05

## 2024-12-05 RX ORDER — ALPRAZOLAM 0.5 MG
0.5 TABLET ORAL ONCE
Refills: 0 | Status: DISCONTINUED | OUTPATIENT
Start: 2024-12-05 | End: 2024-12-05

## 2024-12-05 RX ADMIN — CLONAZEPAM 0.5 MILLIGRAM(S): 0.12 TABLET, ORALLY DISINTEGRATING ORAL at 18:00

## 2024-12-05 RX ADMIN — PANTOPRAZOLE SODIUM 40 MILLIGRAM(S): 40 TABLET, DELAYED RELEASE ORAL at 06:38

## 2024-12-05 RX ADMIN — Medication 400 MILLIGRAM(S): at 20:08

## 2024-12-05 RX ADMIN — Medication 0.5 MILLIGRAM(S): at 15:40

## 2024-12-05 RX ADMIN — Medication 0.5 MILLIGRAM(S): at 21:46

## 2024-12-05 RX ADMIN — Medication 0.5 MILLIGRAM(S): at 06:38

## 2024-12-05 RX ADMIN — Medication 300 MILLIGRAM(S): at 08:18

## 2024-12-05 RX ADMIN — ACETAMINOPHEN, DIPHENHYDRAMINE HCL, PHENYLEPHRINE HCL 3 MILLIGRAM(S): 325; 25; 5 TABLET ORAL at 21:46

## 2024-12-05 RX ADMIN — LITHIUM CARBONATE 300 MILLIGRAM(S): 300 CAPSULE ORAL at 21:46

## 2024-12-05 RX ADMIN — LITHIUM CARBONATE 600 MILLIGRAM(S): 300 CAPSULE ORAL at 12:44

## 2024-12-05 NOTE — PROGRESS NOTE ADULT - SUBJECTIVE AND OBJECTIVE BOX
Patient is a 19y old  Male who presents with a chief complaint of Placement (04 Dec 2024 17:43)      Patient seen and examined at bedside. calm, no acute medical complaints. remains on 1:1    ALLERGIES:  No Known Allergies    MEDICATIONS  (STANDING):  clonazePAM  Tablet 0.5 milliGRAM(s) Oral <User Schedule>  lithium SR (LITHOBID) 300 milliGRAM(s) Oral at bedtime  lithium SR (LITHOBID) 600 milliGRAM(s) Oral daily  pantoprazole    Tablet 40 milliGRAM(s) Oral before breakfast  QUEtiapine 300 milliGRAM(s) Oral <User Schedule>  QUEtiapine 400 milliGRAM(s) Oral <User Schedule>    MEDICATIONS  (PRN):  acetaminophen     Tablet .. 650 milliGRAM(s) Oral every 6 hours PRN Temp greater or equal to 38C (100.4F), Mild Pain (1 - 3)  ALPRAZolam 0.5 milliGRAM(s) Oral every 8 hours PRN anxiety/restlessness.  aluminum hydroxide/magnesium hydroxide/simethicone Suspension 30 milliLiter(s) Oral every 4 hours PRN Dyspepsia  LORazepam   Injectable 1 milliGRAM(s) IntraMuscular every 6 hours PRN Threatening behavior  melatonin 3 milliGRAM(s) Oral at bedtime PRN Insomnia  ondansetron Injectable 4 milliGRAM(s) IV Push every 8 hours PRN Nausea and/or Vomiting  sodium chloride 0.65% Nasal 1 Spray(s) Both Nostrils daily PRN Nasal Congestion    Vital Signs Last 24 Hrs  T(F): 98 (05 Dec 2024 06:41), Max: 98 (05 Dec 2024 06:41)  HR: 85 (05 Dec 2024 06:41) (85 - 114)  BP: 115/75 (05 Dec 2024 06:41) (115/75 - 143/86)  RR: 16 (05 Dec 2024 06:41) (16 - 17)  SpO2: 95% (05 Dec 2024 06:41) (95% - 98%)  I&O's Summary      PHYSICAL EXAM:  General: NAD  ENT: MMM, no scleral icterus  Neck: Supple, No JVD  Lungs: Clear to auscultation bilaterally, no wheezes, rales, rhonchi  Cardio: RRR, S1/S2  Abdomen: Soft, Nontender, Nondistended; Bowel sounds present  Extremities: No calf tenderness, No pitting edema    LABS:                                                  RADIOLOGY & ADDITIONAL TESTS:    Care Discussed with Consultants/Other Providers: yes, CM, SW

## 2024-12-05 NOTE — CHART NOTE - NSCHARTNOTEFT_GEN_A_CORE
Notified by RN that "patient is starting to pace and complain about his stay here. He is starting to get anxious." Patient seen and examined at bedside, lying in bed. States that he feels tired and would like to sleep. Per 1:1 PCA, he has been pacing the room and appearing anxious and just got into bed.   At this time, he does not appear agitated.  Will order PO Xanax 0.5mg x1 per psychiatry recommendations and continue to monitor.

## 2024-12-05 NOTE — PROGRESS NOTE ADULT - ASSESSMENT
20 y/o M with PMH of autism, ADHD, history of episodes of agitation and aggression brought in by EMS from home for agitation.    Agitation/Agression with hx of ADHD, autism  -Continue 1:1   -Continue Lithium  -Continue Seroquel  -Continue Klonopin 0.5mg at 6PM   -Continue Xanax 0.5mg Q8H PRN anxiety/restlessness  -Continue Ativan 1mg IM PRN for threatening behavior   -Psych following, pending acceptance to [facility]/crisis housing  -Appreciate Social Work involvement     DVT PPx  -He has been ambulating    Dispo - Pending placement  -QuantiFeron gold negative 11/29

## 2024-12-06 PROCEDURE — 99231 SBSQ HOSP IP/OBS SF/LOW 25: CPT

## 2024-12-06 PROCEDURE — 99232 SBSQ HOSP IP/OBS MODERATE 35: CPT

## 2024-12-06 RX ORDER — LORAZEPAM 2 MG/1
2 TABLET ORAL ONCE
Refills: 0 | Status: DISCONTINUED | OUTPATIENT
Start: 2024-12-06 | End: 2024-12-06

## 2024-12-06 RX ADMIN — Medication 300 MILLIGRAM(S): at 10:50

## 2024-12-06 RX ADMIN — LITHIUM CARBONATE 300 MILLIGRAM(S): 300 CAPSULE ORAL at 21:10

## 2024-12-06 RX ADMIN — Medication 0.5 MILLIGRAM(S): at 21:09

## 2024-12-06 RX ADMIN — Medication 400 MILLIGRAM(S): at 19:29

## 2024-12-06 RX ADMIN — LORAZEPAM 2 MILLIGRAM(S): 2 TABLET ORAL at 13:25

## 2024-12-06 RX ADMIN — Medication 0.5 MILLIGRAM(S): at 10:50

## 2024-12-06 RX ADMIN — LITHIUM CARBONATE 600 MILLIGRAM(S): 300 CAPSULE ORAL at 13:24

## 2024-12-06 RX ADMIN — PANTOPRAZOLE SODIUM 40 MILLIGRAM(S): 40 TABLET, DELAYED RELEASE ORAL at 10:50

## 2024-12-06 RX ADMIN — ACETAMINOPHEN, DIPHENHYDRAMINE HCL, PHENYLEPHRINE HCL 3 MILLIGRAM(S): 325; 25; 5 TABLET ORAL at 21:10

## 2024-12-06 RX ADMIN — CLONAZEPAM 0.5 MILLIGRAM(S): 0.12 TABLET, ORALLY DISINTEGRATING ORAL at 18:27

## 2024-12-06 NOTE — PROVIDER CONTACT NOTE (OTHER) - NAME OF MD/NP/PA/DO NOTIFIED:
Resident Felton Benedict
Resident Felton Benedict
Resident Jet Dennis Karen
Dr Rush
Resident Jet Dennis Karen

## 2024-12-06 NOTE — BH CONSULTATION LIAISON PROGRESS NOTE - NSBHASSESSMENTFT_PSY_ALL_CORE
19M domiciled with mother and father, has PPHx of autism, ADHD, no past psychiatric hospitalizations, no past SAs, no past NSSIB; + hx of physical aggression (punching, kicking) toward parents when he is frustrated, police called and patient brought to [facility] ED.     Patient followed by C-L service while pending placement with [facility]; with adjustments to medications including Seroquel and Lithium. Klonopin added to regimen as well as Alprazolam prn he  continues to have behavioral outbursts due to poor frustration tolerance and impulsivity.

## 2024-12-06 NOTE — PROGRESS NOTE ADULT - SUBJECTIVE AND OBJECTIVE BOX
Patient is a 19y old  Male who presents with a chief complaint of Placement (05 Dec 2024 11:03)      Patient seen and examined at bedside. calm. no acute medical complaints.     ALLERGIES:  No Known Allergies    MEDICATIONS  (STANDING):  clonazePAM  Tablet 0.5 milliGRAM(s) Oral <User Schedule>  lithium SR (LITHOBID) 300 milliGRAM(s) Oral at bedtime  lithium SR (LITHOBID) 600 milliGRAM(s) Oral daily  pantoprazole    Tablet 40 milliGRAM(s) Oral before breakfast  QUEtiapine 300 milliGRAM(s) Oral <User Schedule>  QUEtiapine 400 milliGRAM(s) Oral <User Schedule>    MEDICATIONS  (PRN):  acetaminophen     Tablet .. 650 milliGRAM(s) Oral every 6 hours PRN Temp greater or equal to 38C (100.4F), Mild Pain (1 - 3)  ALPRAZolam 0.5 milliGRAM(s) Oral every 8 hours PRN anxiety/restlessness.  aluminum hydroxide/magnesium hydroxide/simethicone Suspension 30 milliLiter(s) Oral every 4 hours PRN Dyspepsia  LORazepam   Injectable 1 milliGRAM(s) IntraMuscular every 6 hours PRN Threatening behavior  melatonin 3 milliGRAM(s) Oral at bedtime PRN Insomnia  ondansetron Injectable 4 milliGRAM(s) IV Push every 8 hours PRN Nausea and/or Vomiting  sodium chloride 0.65% Nasal 1 Spray(s) Both Nostrils daily PRN Nasal Congestion    Vital Signs Last 24 Hrs  T(F): 97.8 (05 Dec 2024 19:35), Max: 97.8 (05 Dec 2024 19:35)  HR: 97 (05 Dec 2024 19:35) (97 - 97)  BP: 128/81 (05 Dec 2024 19:35) (128/81 - 128/81)  RR: 17 (05 Dec 2024 19:35) (17 - 17)  SpO2: 96% (05 Dec 2024 19:35) (96% - 96%)  I&O's Summary    PHYSICAL EXAM:  General: NAD  ENT: MMM, no scleral icterus  Neck: Supple, No JVD  Lungs: Clear to auscultation bilaterally, no wheezes, rales, rhonchi  Cardio: RRR, S1/S2  Abdomen: Soft, Nontender, Nondistended; Bowel sounds present  Extremities: No calf tenderness, No pitting edema      LABS:                                                  RADIOLOGY & ADDITIONAL TESTS:    Care Discussed with Consultants/Other Providers:

## 2024-12-06 NOTE — PROVIDER CONTACT NOTE (OTHER) - BACKGROUND
Pt admitted for aggression and agitation. Pt is on constant Observation due to risk of elopement, self harm and harming others.

## 2024-12-06 NOTE — PROVIDER CONTACT NOTE (OTHER) - SITUATION
Pt getting restless and anxious. Pt pacing in room. Pt stated: " I know yo are lying to me to keep me in this room.".
Colton Pace 20 YO M admitted on 10/21 for agitation with hx of ADHD and autism
Pt Colton Pace 18 YO M admitted on 10/21 for agitation with HX od ADHD and autism
Pt Colton Pace admitted on 10/21 for agitation with HX of ADHD and autism
Pt Colton Mullins admitted on 10/21 for agitation with HX od ADHD and autism

## 2024-12-06 NOTE — PROGRESS NOTE ADULT - ASSESSMENT
18 y/o M with PMH of autism, ADHD, history of episodes of agitation and aggression brought in by EMS from home for agitation.    Agitation/Agression with hx of ADHD, autism  -Continue 1:1   -Continue Lithium  -Continue Seroquel  -Continue Klonopin 0.5mg at 6PM   -Continue Xanax 0.5mg Q8H PRN anxiety/restlessness  -Continue Ativan 1mg IM PRN for threatening behavior   -Psych following, pending acceptance to [facility]/crisis housing  -Appreciate Social Work involvement   -AM labs    DVT PPx  -He has been ambulating    Dispo - Pending placement  -QuantiFeron gold negative 11/29

## 2024-12-06 NOTE — PROVIDER CONTACT NOTE (OTHER) - REASON
[employer] employee called and notified that Pt Colton Pace got out of bed and laid on the floor willingly with his headphones on and phone at hand
Pt Colton Pace became more agitated and began to yell inappropriately at staff members
Pt Colton Pace started to become agitated began to scream and attempted to break the [employer] camera.
Pt began to hallucinate stating the wall was talking to him and started to get violent towards staff members
Pt getting restless and anxious. Pt pacing in room. Pt stated: " I know yo are lying to me to keep me in this room.".

## 2024-12-07 LAB
ANION GAP SERPL CALC-SCNC: 5 MMOL/L — SIGNIFICANT CHANGE UP (ref 5–17)
BUN SERPL-MCNC: 9 MG/DL — SIGNIFICANT CHANGE UP (ref 7–23)
CALCIUM SERPL-MCNC: 9.1 MG/DL — SIGNIFICANT CHANGE UP (ref 8.4–10.5)
CHLORIDE SERPL-SCNC: 108 MMOL/L — SIGNIFICANT CHANGE UP (ref 96–108)
CO2 SERPL-SCNC: 29 MMOL/L — SIGNIFICANT CHANGE UP (ref 22–31)
CREAT SERPL-MCNC: 0.91 MG/DL — SIGNIFICANT CHANGE UP (ref 0.5–1.3)
EGFR: 124 ML/MIN/1.73M2 — SIGNIFICANT CHANGE UP
GLUCOSE SERPL-MCNC: 95 MG/DL — SIGNIFICANT CHANGE UP (ref 70–99)
HCT VFR BLD CALC: 47 % — SIGNIFICANT CHANGE UP (ref 39–50)
HGB BLD-MCNC: 16.3 G/DL — SIGNIFICANT CHANGE UP (ref 13–17)
MCHC RBC-ENTMCNC: 31 PG — SIGNIFICANT CHANGE UP (ref 27–34)
MCHC RBC-ENTMCNC: 34.7 G/DL — SIGNIFICANT CHANGE UP (ref 32–36)
MCV RBC AUTO: 89.4 FL — SIGNIFICANT CHANGE UP (ref 80–100)
NRBC # BLD: 0 /100 WBCS — SIGNIFICANT CHANGE UP (ref 0–0)
PLATELET # BLD AUTO: 212 K/UL — SIGNIFICANT CHANGE UP (ref 150–400)
POTASSIUM SERPL-MCNC: 4.2 MMOL/L — SIGNIFICANT CHANGE UP (ref 3.5–5.3)
POTASSIUM SERPL-SCNC: 4.2 MMOL/L — SIGNIFICANT CHANGE UP (ref 3.5–5.3)
RBC # BLD: 5.26 M/UL — SIGNIFICANT CHANGE UP (ref 4.2–5.8)
RBC # FLD: 11.3 % — SIGNIFICANT CHANGE UP (ref 10.3–14.5)
SODIUM SERPL-SCNC: 142 MMOL/L — SIGNIFICANT CHANGE UP (ref 135–145)
WBC # BLD: 8.68 K/UL — SIGNIFICANT CHANGE UP (ref 3.8–10.5)
WBC # FLD AUTO: 8.68 K/UL — SIGNIFICANT CHANGE UP (ref 3.8–10.5)

## 2024-12-07 PROCEDURE — 99232 SBSQ HOSP IP/OBS MODERATE 35: CPT

## 2024-12-07 PROCEDURE — 99231 SBSQ HOSP IP/OBS SF/LOW 25: CPT

## 2024-12-07 RX ORDER — HYDROXYZINE HYDROCHLORIDE 25 MG/1
25 TABLET, FILM COATED ORAL EVERY 6 HOURS
Refills: 0 | Status: DISCONTINUED | OUTPATIENT
Start: 2024-12-07 | End: 2024-12-13

## 2024-12-07 RX ADMIN — Medication 300 MILLIGRAM(S): at 08:31

## 2024-12-07 RX ADMIN — PANTOPRAZOLE SODIUM 40 MILLIGRAM(S): 40 TABLET, DELAYED RELEASE ORAL at 06:30

## 2024-12-07 RX ADMIN — LITHIUM CARBONATE 600 MILLIGRAM(S): 300 CAPSULE ORAL at 12:33

## 2024-12-07 RX ADMIN — LITHIUM CARBONATE 300 MILLIGRAM(S): 300 CAPSULE ORAL at 21:36

## 2024-12-07 RX ADMIN — Medication 400 MILLIGRAM(S): at 19:33

## 2024-12-07 RX ADMIN — ACETAMINOPHEN, DIPHENHYDRAMINE HCL, PHENYLEPHRINE HCL 3 MILLIGRAM(S): 325; 25; 5 TABLET ORAL at 21:36

## 2024-12-07 RX ADMIN — HYDROXYZINE HYDROCHLORIDE 25 MILLIGRAM(S): 25 TABLET, FILM COATED ORAL at 21:36

## 2024-12-07 NOTE — BH CONSULTATION LIAISON PROGRESS NOTE - NSBHCONSULTRECOMMENDOTHER_PSY_A_CORE FT
- Continue with Lithium 600mg daily; 300mg qhs.  - Continue with Seroquel 300mg daily; 400mg qhs.  - 2 PC  done  for inpatient psychiatric admission.

## 2024-12-07 NOTE — PROGRESS NOTE ADULT - SUBJECTIVE AND OBJECTIVE BOX
Patient is a 19y old  Male who presents with a chief complaint of Placement (06 Dec 2024 09:30)      Patient seen and examined at bedside, stable, NAD. denies acute medical complaints. remains calm.     ALLERGIES:  No Known Allergies    MEDICATIONS  (STANDING):  clonazePAM  Tablet 0.5 milliGRAM(s) Oral <User Schedule>  lithium SR (LITHOBID) 300 milliGRAM(s) Oral at bedtime  lithium SR (LITHOBID) 600 milliGRAM(s) Oral daily  pantoprazole    Tablet 40 milliGRAM(s) Oral before breakfast  QUEtiapine 300 milliGRAM(s) Oral <User Schedule>  QUEtiapine 400 milliGRAM(s) Oral <User Schedule>    MEDICATIONS  (PRN):  acetaminophen     Tablet .. 650 milliGRAM(s) Oral every 6 hours PRN Temp greater or equal to 38C (100.4F), Mild Pain (1 - 3)  ALPRAZolam 0.5 milliGRAM(s) Oral every 8 hours PRN anxiety/restlessness.  aluminum hydroxide/magnesium hydroxide/simethicone Suspension 30 milliLiter(s) Oral every 4 hours PRN Dyspepsia  melatonin 3 milliGRAM(s) Oral at bedtime PRN Insomnia  ondansetron Injectable 4 milliGRAM(s) IV Push every 8 hours PRN Nausea and/or Vomiting  sodium chloride 0.65% Nasal 1 Spray(s) Both Nostrils daily PRN Nasal Congestion    Vital Signs Last 24 Hrs  T(F): 97.5 (06 Dec 2024 18:54), Max: 98.4 (06 Dec 2024 13:50)  HR: 107 (06 Dec 2024 18:54) (107 - 111)  BP: 138/81 (06 Dec 2024 18:54) (133/80 - 138/81)  RR: 16 (06 Dec 2024 18:54) (16 - 18)  SpO2: 97% (06 Dec 2024 18:54) (96% - 97%)  I&O's Summary    06 Dec 2024 07:01  -  07 Dec 2024 07:00  --------------------------------------------------------  IN: 240 mL / OUT: 0 mL / NET: 240 mL        PHYSICAL EXAM:  General: NAD  ENT: MMM, no scleral icterus  Neck: Supple, No JVD  Lungs: Clear to auscultation bilaterally, no wheezes, rales, rhonchi  Cardio: RRR, S1/S2  Abdomen: Soft, Nontender, Nondistended; Bowel sounds present  Extremities: No calf tenderness, No pitting edema    LABS:                        16.3   8.68  )-----------( 212      ( 07 Dec 2024 07:02 )             47.0       12-07    142  |  108  |  9   ----------------------------<  95  4.2   |  29  |  0.91    Ca    9.1      07 Dec 2024 07:02                                    Urinalysis Basic - ( 07 Dec 2024 07:02 )    Color: x / Appearance: x / SG: x / pH: x  Gluc: 95 mg/dL / Ketone: x  / Bili: x / Urobili: x   Blood: x / Protein: x / Nitrite: x   Leuk Esterase: x / RBC: x / WBC x   Sq Epi: x / Non Sq Epi: x / Bacteria: x            RADIOLOGY & ADDITIONAL TESTS:    Care Discussed with Consultants/Other Providers:

## 2024-12-07 NOTE — PROGRESS NOTE ADULT - ASSESSMENT
18 y/o M with PMH of autism, ADHD, history of episodes of agitation and aggression brought in by EMS from home for agitation.    Agitation/Agression with hx of ADHD, autism  -Continue 1:1 for AWOL risk  -Continue Lithium 600mg daily, 300mg qhs  -Continue Seroquel 300,g daily, 400mg qhs  -Continue Klonopin 0.5mg at 6PM   -Continue Xanax 0.5mg Q8H PRN anxiety/restlessness  -Continue Ativan 1mg IM/IVP q8h prn agitation  -May use haldol 5mg IM q8h prn severe agitation    -Psych following, pending acceptance to [facility]/crisis housing. suggested use of vistaril (hydroxyzine 25mg q6h, prn anxiety as an alternative to benzos for for sleep).   -Repeat lithium level 12/9  -Appreciate Social Work involvement   -Labs reviewed    DVT PPx  -Patient is ambulatory    Dispo - Pending placement  -QuantiFeron gold negative 11/29 12/7 - Father updated at bedside. All questions/concerns addressed.     20 y/o M with PMH of autism, ADHD, history of episodes of agitation and aggression brought in by EMS from home for agitation.    Agitation/Agression with hx of ADHD, autism  -Continue 1:1 for AWOL risk  -Continue Lithium 600mg daily, 300mg qhs  -Continue Seroquel 300,g daily, 400mg qhs  -Continue Xanax 0.5mg Q8H PRN anxiety/restlessness  -Continue Ativan 1mg IM/IVP q8h prn agitation  -May use haldol 5mg IM q8h prn severe agitation    -Psych following, pending acceptance to [facility]/crisis housing. suggested use of vistaril (hydroxyzine 25mg q6h, prn anxiety as an alternative to benzos for for sleep)  -Klonopin 0.5mg at 6PM dc'd, hydroxyzine 25mg q6h prn started 12/7  -Repeat lithium level 12/9  -Appreciate Social Work involvement   -Labs reviewed    DVT PPx  -Patient is ambulatory    Dispo - Pending placement  -QuantiFeron gold negative 11/29 12/7 - Father updated at bedside. All questions/concerns addressed

## 2024-12-07 NOTE — BH CONSULTATION LIAISON PROGRESS NOTE - NSBHASSESSMENTFT_PSY_ALL_CORE
19M domiciled with mother and father, has PPHx of autism, ADHD, no past psychiatric hospitalizations, no past SAs, no past NSSIB; + hx of physical aggression (punching, kicking) toward parents when he is frustrated, police called and patient brought to [facility] ED.     Patient followed by C-L service while pending placement with [facility]; with adjustments to medications including Seroquel and Lithium. Klonopin added to regimen as well as Alprazolam prn he  continues to have behavioral outbursts due to poor frustration tolerance and impulsivity.

## 2024-12-08 PROCEDURE — 99232 SBSQ HOSP IP/OBS MODERATE 35: CPT

## 2024-12-08 RX ADMIN — PANTOPRAZOLE SODIUM 40 MILLIGRAM(S): 40 TABLET, DELAYED RELEASE ORAL at 06:34

## 2024-12-08 RX ADMIN — LITHIUM CARBONATE 600 MILLIGRAM(S): 300 CAPSULE ORAL at 12:11

## 2024-12-08 RX ADMIN — LITHIUM CARBONATE 300 MILLIGRAM(S): 300 CAPSULE ORAL at 20:46

## 2024-12-08 RX ADMIN — HYDROXYZINE HYDROCHLORIDE 25 MILLIGRAM(S): 25 TABLET, FILM COATED ORAL at 21:58

## 2024-12-08 RX ADMIN — Medication 0.5 MILLIGRAM(S): at 21:57

## 2024-12-08 RX ADMIN — Medication 400 MILLIGRAM(S): at 20:45

## 2024-12-08 RX ADMIN — Medication 300 MILLIGRAM(S): at 08:46

## 2024-12-08 RX ADMIN — ACETAMINOPHEN, DIPHENHYDRAMINE HCL, PHENYLEPHRINE HCL 3 MILLIGRAM(S): 325; 25; 5 TABLET ORAL at 21:57

## 2024-12-08 NOTE — PROGRESS NOTE ADULT - SUBJECTIVE AND OBJECTIVE BOX
Patient is a 19y old  Male who presents with a chief complaint of Placement (07 Dec 2024 09:55)      Patient seen and examined at bedside. no acute events overnight. denies headache, fever, chills, cp, sob, n/v, abd pain.     ALLERGIES:  No Known Allergies    MEDICATIONS  (STANDING):  lithium SR (LITHOBID) 300 milliGRAM(s) Oral at bedtime  lithium SR (LITHOBID) 600 milliGRAM(s) Oral daily  pantoprazole    Tablet 40 milliGRAM(s) Oral before breakfast  QUEtiapine 300 milliGRAM(s) Oral <User Schedule>  QUEtiapine 400 milliGRAM(s) Oral <User Schedule>    MEDICATIONS  (PRN):  acetaminophen     Tablet .. 650 milliGRAM(s) Oral every 6 hours PRN Temp greater or equal to 38C (100.4F), Mild Pain (1 - 3)  ALPRAZolam 0.5 milliGRAM(s) Oral every 8 hours PRN anxiety/restlessness.  aluminum hydroxide/magnesium hydroxide/simethicone Suspension 30 milliLiter(s) Oral every 4 hours PRN Dyspepsia  hydrOXYzine hydrochloride 25 milliGRAM(s) Oral every 6 hours PRN Restlessness/Sleep  melatonin 3 milliGRAM(s) Oral at bedtime PRN Insomnia  ondansetron Injectable 4 milliGRAM(s) IV Push every 8 hours PRN Nausea and/or Vomiting  sodium chloride 0.65% Nasal 1 Spray(s) Both Nostrils daily PRN Nasal Congestion    Vital Signs Last 24 Hrs  T(F): 97.9 (08 Dec 2024 05:00), Max: 98.8 (07 Dec 2024 12:41)  HR: 95 (08 Dec 2024 05:00) (95 - 137)  BP: 114/71 (08 Dec 2024 05:00) (114/71 - 141/83)  RR: 18 (08 Dec 2024 05:00) (18 - 18)  SpO2: 95% (08 Dec 2024 05:00) (95% - 95%)  I&O's Summary      PHYSICAL EXAM:  General: NAD  ENT: MMM, no scleral icterus  Neck: Supple, No JVD  Lungs: Clear to auscultation bilaterally, no wheezes, rales, rhonchi  Cardio: RRR, S1/S2  Abdomen: Soft, Nontender, Nondistended; Bowel sounds present  Extremities: No calf tenderness, No pitting edema    LABS:                        16.3   8.68  )-----------( 212      ( 07 Dec 2024 07:02 )             47.0       12-07    142  |  108  |  9   ----------------------------<  95  4.2   |  29  |  0.91    Ca    9.1      07 Dec 2024 07:02                                    Urinalysis Basic - ( 07 Dec 2024 07:02 )    Color: x / Appearance: x / SG: x / pH: x  Gluc: 95 mg/dL / Ketone: x  / Bili: x / Urobili: x   Blood: x / Protein: x / Nitrite: x   Leuk Esterase: x / RBC: x / WBC x   Sq Epi: x / Non Sq Epi: x / Bacteria: x            RADIOLOGY & ADDITIONAL TESTS:    Care Discussed with Consultants/Other Providers:

## 2024-12-08 NOTE — PROGRESS NOTE ADULT - ASSESSMENT
20 y/o M with PMH of autism, ADHD, history of episodes of agitation and aggression brought in by EMS from home for agitation.    Agitation/Agression with hx of ADHD, autism  -Continue 1:1 for AWOL risk  -Continue Lithium 600mg daily, 300mg qhs  -Continue Seroquel 300,g daily, 400mg qhs  -Continue Xanax 0.5mg Q8H PRN anxiety/restlessness  -Continue Ativan 1mg IM/IVP q8h prn agitation  -May use haldol 5mg IM q8h prn severe agitation    -Psych following, pending acceptance to [facility]/crisis housing. suggested use of vistaril (hydroxyzine 25mg q6h, prn anxiety as an alternative to benzos for for sleep)  -Klonopin 0.5mg at 6PM dc'd, hydroxyzine 25mg q6h prn started 12/7  -Repeat lithium level 12/9  -Appreciate Social Work involvement   -Labs reviewed    DVT PPx  -Patient is ambulatory    Dispo - Pending placement  -QuantiFeron gold negative 11/29 12/7 - Father updated at bedside. All questions/concerns addressed

## 2024-12-09 LAB — LITHIUM SERPL-MCNC: 0.52 MMOL/L — LOW (ref 0.6–1.2)

## 2024-12-09 PROCEDURE — 99232 SBSQ HOSP IP/OBS MODERATE 35: CPT

## 2024-12-09 RX ADMIN — PANTOPRAZOLE SODIUM 40 MILLIGRAM(S): 40 TABLET, DELAYED RELEASE ORAL at 06:46

## 2024-12-09 RX ADMIN — Medication 400 MILLIGRAM(S): at 20:34

## 2024-12-09 RX ADMIN — LITHIUM CARBONATE 300 MILLIGRAM(S): 300 CAPSULE ORAL at 21:36

## 2024-12-09 RX ADMIN — LITHIUM CARBONATE 600 MILLIGRAM(S): 300 CAPSULE ORAL at 12:05

## 2024-12-09 RX ADMIN — Medication 300 MILLIGRAM(S): at 08:45

## 2024-12-09 NOTE — PROGRESS NOTE ADULT - SUBJECTIVE AND OBJECTIVE BOX
Patient is a 19y old  Male who presents with a chief complaint of Placement (09 Dec 2024 07:54)      Patient seen and examined at bedside. no acute complaints or overnight events.     ALLERGIES:  No Known Allergies    MEDICATIONS  (STANDING):  lithium SR (LITHOBID) 300 milliGRAM(s) Oral at bedtime  lithium SR (LITHOBID) 600 milliGRAM(s) Oral daily  pantoprazole    Tablet 40 milliGRAM(s) Oral before breakfast  QUEtiapine 300 milliGRAM(s) Oral <User Schedule>  QUEtiapine 400 milliGRAM(s) Oral <User Schedule>    MEDICATIONS  (PRN):  acetaminophen     Tablet .. 650 milliGRAM(s) Oral every 6 hours PRN Temp greater or equal to 38C (100.4F), Mild Pain (1 - 3)  ALPRAZolam 0.5 milliGRAM(s) Oral every 8 hours PRN anxiety/restlessness.  aluminum hydroxide/magnesium hydroxide/simethicone Suspension 30 milliLiter(s) Oral every 4 hours PRN Dyspepsia  hydrOXYzine hydrochloride 25 milliGRAM(s) Oral every 6 hours PRN Restlessness/Sleep  melatonin 3 milliGRAM(s) Oral at bedtime PRN Insomnia  ondansetron Injectable 4 milliGRAM(s) IV Push every 8 hours PRN Nausea and/or Vomiting  sodium chloride 0.65% Nasal 1 Spray(s) Both Nostrils daily PRN Nasal Congestion    Vital Signs Last 24 Hrs  T(F): 97.8 (09 Dec 2024 06:20), Max: 98 (08 Dec 2024 12:00)  HR: 96 (09 Dec 2024 06:20) (96 - 122)  BP: 115/79 (09 Dec 2024 06:20) (115/79 - 133/89)  RR: 16 (09 Dec 2024 06:20) (16 - 20)  SpO2: 96% (09 Dec 2024 06:20) (95% - 96%)  I&O's Summary    PHYSICAL EXAM:  General: NAD  ENT: MMM, no scleral icterus  Neck: Supple, No JVD  Lungs: Clear to auscultation bilaterally, no wheezes, rales, rhonchi  Cardio: RRR, S1/S2  Abdomen: Soft, Nontender, Nondistended; Bowel sounds present  Extremities: No calf tenderness, No pitting edema      LABS:                        16.3   8.68  )-----------( 212      ( 07 Dec 2024 07:02 )             47.0       12-07    142  |  108  |  9   ----------------------------<  95  4.2   |  29  |  0.91    Ca    9.1      07 Dec 2024 07:02                                    Urinalysis Basic - ( 07 Dec 2024 07:02 )    Color: x / Appearance: x / SG: x / pH: x  Gluc: 95 mg/dL / Ketone: x  / Bili: x / Urobili: x   Blood: x / Protein: x / Nitrite: x   Leuk Esterase: x / RBC: x / WBC x   Sq Epi: x / Non Sq Epi: x / Bacteria: x            RADIOLOGY & ADDITIONAL TESTS:    Care Discussed with Consultants/Other Providers:

## 2024-12-09 NOTE — PROGRESS NOTE ADULT - ASSESSMENT
18 y/o M with PMH of autism, ADHD, history of episodes of agitation and aggression brought in by EMS from home for agitation.    Agitation/Agression with hx of ADHD, autism  -Continue 1:1 for AWOL risk  -Continue Lithium 600mg daily, 300mg qhs  -Continue Seroquel 300,g daily, 400mg qhs  -Continue Xanax 0.5mg Q8H PRN anxiety/restlessness  -Continue Ativan 1mg IM/IVP q8h prn agitation  -May use haldol 5mg IM q8h prn severe agitation    -Psych following, pending acceptance to [facility]/crisis housing. suggested use of vistaril (hydroxyzine 25mg q6h, prn anxiety as an alternative to benzos for for sleep)  -Klonopin 0.5mg at 6PM dc'd, hydroxyzine 25mg q6h prn started 12/7  -Repeat lithium level 12/9 pending  -Appreciate Social Work involvement   -Labs reviewed    DVT PPx  -Patient is ambulatory    Dispo - Pending placement  -QuantiFeron gold negative 11/29 12/7 - Father updated at bedside. All questions/concerns addressed   18 y/o M with PMH of autism, ADHD, history of episodes of agitation and aggression brought in by EMS from home for agitation.    Agitation/Agression with hx of ADHD, autism  -Continue 1:1 for AWOL risk  -Continue Lithium 600mg daily, 300mg qhs  -Continue Seroquel 300,g daily, 400mg qhs  -Continue Xanax 0.5mg Q8H PRN anxiety/restlessness  -Continue Ativan 1mg IM/IVP q8h prn agitation  -May use haldol 5mg IM q8h prn severe agitation    -Psych following, pending acceptance to [facility]/crisis housing. suggested use of vistaril (hydroxyzine 25mg q6h, prn anxiety as an alternative to benzos for for sleep)  -Klonopin 0.5mg at 6PM dc'd, hydroxyzine 25mg q6h prn started 12/7  -Repeat lithium level 12/9 in process  -Appreciate Social Work involvement   -Labs reviewed    DVT PPx  -Patient is ambulatory    Dispo - Pending placement  -QuantiFeron gold negative 11/29 12/7 - Father updated at bedside. All questions/concerns addressed   18 y/o M with PMH of autism, ADHD, history of episodes of agitation and aggression brought in by EMS from home for agitation.    Agitation/Agression with hx of ADHD, autism  -Continue 1:1 for AWOL risk  -Continue Lithium 600mg daily, 300mg qhs  -Continue Seroquel 300,g daily, 400mg qhs  -Continue Xanax 0.5mg Q8H PRN anxiety/restlessness  -Continue Ativan 1mg IM/IVP q8h prn agitation  -May use haldol 5mg IM q8h prn severe agitation    -Psych following, pending acceptance to [facility]/crisis housing. suggested use of vistaril (hydroxyzine 25mg q6h, prn anxiety as an alternative to benzos for for sleep)  -Klonopin 0.5mg at 6PM dc'd, hydroxyzine 25mg q6h prn started 12/7  -Repeat lithium level 12/9 in process  -Appreciate Social Work involvement     DVT PPx  -Patient is ambulatory    Dispo - Pending placement - anticipate f/u meeting 12/10 to secure placement  -QuantiFeron gold negative 11/29 12/7 - Father updated at bedside. All questions/concerns addressed

## 2024-12-10 VITALS
HEART RATE: 95 BPM | SYSTOLIC BLOOD PRESSURE: 136 MMHG | RESPIRATION RATE: 16 BRPM | OXYGEN SATURATION: 97 % | DIASTOLIC BLOOD PRESSURE: 93 MMHG

## 2024-12-10 PROCEDURE — 99233 SBSQ HOSP IP/OBS HIGH 50: CPT

## 2024-12-10 PROCEDURE — 99232 SBSQ HOSP IP/OBS MODERATE 35: CPT

## 2024-12-10 RX ORDER — LITHIUM CARBONATE 300 MG/1
300 CAPSULE ORAL
Refills: 0 | Status: DISCONTINUED | OUTPATIENT
Start: 2024-12-10 | End: 2024-12-13

## 2024-12-10 RX ADMIN — Medication 0.5 MILLIGRAM(S): at 19:38

## 2024-12-10 RX ADMIN — Medication 0.5 MILLIGRAM(S): at 11:09

## 2024-12-10 RX ADMIN — HYDROXYZINE HYDROCHLORIDE 25 MILLIGRAM(S): 25 TABLET, FILM COATED ORAL at 14:19

## 2024-12-10 RX ADMIN — Medication 300 MILLIGRAM(S): at 08:59

## 2024-12-10 RX ADMIN — LITHIUM CARBONATE 600 MILLIGRAM(S): 300 CAPSULE ORAL at 11:09

## 2024-12-10 RX ADMIN — LITHIUM CARBONATE 300 MILLIGRAM(S): 300 CAPSULE ORAL at 19:49

## 2024-12-10 RX ADMIN — PANTOPRAZOLE SODIUM 40 MILLIGRAM(S): 40 TABLET, DELAYED RELEASE ORAL at 06:40

## 2024-12-10 RX ADMIN — Medication 400 MILLIGRAM(S): at 19:38

## 2024-12-10 NOTE — BH CONSULTATION LIAISON PROGRESS NOTE - NSBHCONSULTMEDAGITATION_PSY_A_CORE FT
Ativan 1 mg IM/IVP q 8 h prn
Ativan 2 mg po 
Ativan 1 mg IM/IVP q 8 h prn
Ativan 2 mg IVP or IM q 8 h prn
Ativan 2 mg po 
Ativan 1 mg IM/IVP q 8 h prn
Ativan 1 mg IM/IVP q 8 h prn
ativan 2 mg
Ativan 2 mg IM/IVP q 8 h prn
Ativan 1 mg IM/IVP q 8 h prn
Ativan 2 mg po 
ativan 2 mg
Ativan 1 mg IM/IVP q 8 h prn
ativan 1 mg IVP or IM

## 2024-12-10 NOTE — BH CONSULTATION LIAISON PROGRESS NOTE - NSBHCONSULTRECOMMENDOTHER_PSY_A_CORE FT
- Continue with Lithium SR 600mg daily; 300mg qhs:   FOR OUTPATIENT PHARMACY, ORDER WILL NEED TO BE WRITTEN AS LITHIUM SR 300MG TWO PILLS IN THE AFTERNOON AND 300MG BEFORE BEDTIME. DO NOT SPLIT MEDICATION INTO SEPARATE ORDERS OR IT WILL CONTINUED TO BE REJECTED BY HIS OUTPATIENT PHARMACY.  - Continue with Seroquel 300mg daily; 400mg qhs.  - Per psychiatric attending, patient will require Xanax 0.5mg PRN moving forward. Forms will be completed and send over to [facility] pending psychiatric attending signature.

## 2024-12-10 NOTE — PROGRESS NOTE ADULT - ASSESSMENT
18 y/o M with PMH of autism, ADHD, history of episodes of agitation and aggression brought in by EMS from home for agitation.    Agitation/Agression with hx of ADHD, autism  -Continue 1:1 for AWOL risk  -Continue Lithium 600mg daily, 300mg qhs  -Continue Seroquel 300,g daily, 400mg qhs  -Continue Xanax 0.5mg Q8H PRN anxiety/restlessness  -Continue Ativan 1mg IM/IVP q8h prn agitation  -May use haldol 5mg IM q8h prn severe agitation    -Psych following, pending acceptance to [facility]/crisis housing. suggested use of vistaril (hydroxyzine 25mg q6h, prn anxiety as an alternative to benzos for for sleep)  -Klonopin 0.5mg at 6PM dc'd, hydroxyzine 25mg q6h prn started 12/7  -Repeat lithium level 12/9 reviewed  -Appreciate Social Work involvement     DVT PPx  -Patient is ambulatory    Dispo - Pending placement - discussed in team meeting 12/10. Appreciate psych clarification with pharmacy for final med recc for dispo planning. no medical contraindications to discharge.    -QuantiFeron gold negative 11/29 12/7 - Father updated at bedside. All questions/concerns addressed

## 2024-12-10 NOTE — PROGRESS NOTE ADULT - SUBJECTIVE AND OBJECTIVE BOX
Patient is a 19y old  Male who presents with a chief complaint of Placement (09 Dec 2024 07:54)      Patient seen and examined at bedside.  no acute events or medical complaints.     ALLERGIES:  No Known Allergies    MEDICATIONS  (STANDING):  lithium SR (LITHOBID) 300 milliGRAM(s) Oral at bedtime  lithium SR (LITHOBID) 600 milliGRAM(s) Oral daily  pantoprazole    Tablet 40 milliGRAM(s) Oral before breakfast  QUEtiapine 300 milliGRAM(s) Oral <User Schedule>  QUEtiapine 400 milliGRAM(s) Oral <User Schedule>    MEDICATIONS  (PRN):  acetaminophen     Tablet .. 650 milliGRAM(s) Oral every 6 hours PRN Temp greater or equal to 38C (100.4F), Mild Pain (1 - 3)  ALPRAZolam 0.5 milliGRAM(s) Oral every 8 hours PRN anxiety/restlessness.  aluminum hydroxide/magnesium hydroxide/simethicone Suspension 30 milliLiter(s) Oral every 4 hours PRN Dyspepsia  hydrOXYzine hydrochloride 25 milliGRAM(s) Oral every 6 hours PRN Restlessness/Sleep  melatonin 3 milliGRAM(s) Oral at bedtime PRN Insomnia  ondansetron Injectable 4 milliGRAM(s) IV Push every 8 hours PRN Nausea and/or Vomiting  sodium chloride 0.65% Nasal 1 Spray(s) Both Nostrils daily PRN Nasal Congestion    Vital Signs Last 24 Hrs  T(F): 97.9 (10 Dec 2024 05:00), Max: 98.7 (09 Dec 2024 20:00)  HR: 113 (10 Dec 2024 05:00) (113 - 117)  BP: 123/76 (10 Dec 2024 05:00) (123/76 - 138/89)  RR: 17 (10 Dec 2024 05:00) (17 - 18)  SpO2: 95% (10 Dec 2024 05:00) (95% - 97%)  I&O's Summary    PHYSICAL EXAM:  General: NAD  ENT: MMM, no scleral icterus  Neck: Supple, No JVD  Lungs: Clear to auscultation bilaterally, no wheezes, rales, rhonchi  Cardio: RRR, S1/S2  Abdomen: Soft, Nontender, Nondistended; Bowel sounds present  Extremities: No calf tenderness, No pitting edema    LABS:                                                  RADIOLOGY & ADDITIONAL TESTS:    Care Discussed with Consultants/Other Providers: yes, psych

## 2024-12-10 NOTE — BH CONSULTATION LIAISON PROGRESS NOTE - NSBHTIMEACTIVITIESPERFORMED_PSY_A_CORE
Case discussed with multiple members of the interdisciplinary team. Discussed case with [facility] and [facility]. Case discussed with patient's outpatient pharmacy in [facility]. Patient evaluated and discussed case at length with him.

## 2024-12-10 NOTE — BH CONSULTATION LIAISON PROGRESS NOTE - NSBHCHARTREVIEWLAB_PSY_A_CORE FT
Lithium level done less than 0.2    10-22    143  |  105  |  11  ----------------------------<  138[H]  3.6   |  27  |  1.00    Ca    9.4      22 Oct 2024 07:24  Mg     2.2     10-22    TPro  7.4  /  Alb  4.7  /  TBili  0.8  /  DBili  x   /  AST  25  /  ALT  25  /  AlkPhos  115  10-20                          16.8   9.62  )-----------( 216      ( 20 Oct 2024 15:10 )             47.8     
11-23    144  |  106  |  13  ----------------------------<  90  3.7   |  28  |  1.00    Ca    9.3      23 Nov 2024 07:41  Phos  4.0     11-23  Mg     2.1     11-23    TPro  7.1  /  Alb  4.1  /  TBili  1.5[H]  /  DBili  x   /  AST  29  /  ALT  33  /  AlkPhos  79  11-23                          16.7   7.29  )-----------( 221      ( 23 Nov 2024 07:41 )             47.7     Lithium level therapeutic at 0.71  
11-22    143  |  104  |  12  ----------------------------<  106[H]  3.6   |  24  |  0.96    Ca    9.5      22 Nov 2024 06:00  Phos  3.3     11-22  Mg     2.0     11-22    TPro  7.3  /  Alb  4.6  /  TBili  1.6[H]  /  DBili  x   /  AST  30  /  ALT  30  /  AlkPhos  85  11-22                          16.5   11.12 )-----------( 262      ( 22 Nov 2024 06:00 )             46.8     
Lithium level= 0.32  11-08    140  |  107  |  12  ----------------------------<  97  4.2   |  25  |  1.09    Ca    8.9      08 Nov 2024 08:41    TPro  6.5  /  Alb  3.9  /  TBili  1.0  /  DBili  x   /  AST  21  /  ALT  32  /  AlkPhos  96  11-08                          16.7   7.19  )-----------( 217      ( 08 Nov 2024 08:41 )             47.2     
Lithium Level, Serum: 0.52 mmoL/L (12.09.24 @ 07:35)
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
## 2024-12-11 PROCEDURE — 99232 SBSQ HOSP IP/OBS MODERATE 35: CPT

## 2024-12-11 RX ORDER — LORAZEPAM 2 MG/1
4 TABLET ORAL ONCE
Refills: 0 | Status: DISCONTINUED | OUTPATIENT
Start: 2024-12-11 | End: 2024-12-11

## 2024-12-11 RX ORDER — HALOPERIDOL 2 MG
5 TABLET ORAL ONCE
Refills: 0 | Status: COMPLETED | OUTPATIENT
Start: 2024-12-11 | End: 2024-12-11

## 2024-12-11 RX ORDER — LITHIUM CARBONATE 300 MG/1
3 CAPSULE ORAL
Qty: 30 | Refills: 0
Start: 2024-12-11 | End: 2025-01-09

## 2024-12-11 RX ORDER — LITHIUM CARBONATE 300 MG/1
3 CAPSULE ORAL
Qty: 90 | Refills: 0
Start: 2024-12-11 | End: 2025-01-09

## 2024-12-11 RX ORDER — DIPHENHYDRAMINE HCL 25 MG
50 CAPSULE ORAL ONCE
Refills: 0 | Status: COMPLETED | OUTPATIENT
Start: 2024-12-11 | End: 2024-12-11

## 2024-12-11 RX ADMIN — Medication 50 MILLIGRAM(S): at 20:02

## 2024-12-11 RX ADMIN — Medication 300 MILLIGRAM(S): at 08:59

## 2024-12-11 RX ADMIN — Medication 0.5 MILLIGRAM(S): at 06:33

## 2024-12-11 RX ADMIN — Medication 5 MILLIGRAM(S): at 20:02

## 2024-12-11 RX ADMIN — LORAZEPAM 4 MILLIGRAM(S): 2 TABLET ORAL at 20:03

## 2024-12-11 RX ADMIN — LITHIUM CARBONATE 300 MILLIGRAM(S): 300 CAPSULE ORAL at 12:21

## 2024-12-11 RX ADMIN — PANTOPRAZOLE SODIUM 40 MILLIGRAM(S): 40 TABLET, DELAYED RELEASE ORAL at 06:33

## 2024-12-11 RX ADMIN — LITHIUM CARBONATE 300 MILLIGRAM(S): 300 CAPSULE ORAL at 12:20

## 2024-12-11 RX ADMIN — Medication 0.5 MILLIGRAM(S): at 17:12

## 2024-12-11 NOTE — PROGRESS NOTE ADULT - SUBJECTIVE AND OBJECTIVE BOX
Patient is a 19y old  Male who presents with a chief complaint of Placement (10 Dec 2024 10:50)      Patient seen and examined at bedside. calm, no acute events. continues to ask about dispo plan, emotional support provided.    ALLERGIES:  No Known Allergies    MEDICATIONS  (STANDING):  lithium SR (LITHOBID) 300 milliGRAM(s) Oral <User Schedule>  lithium SR (LITHOBID) 300 milliGRAM(s) Oral <User Schedule>  pantoprazole    Tablet 40 milliGRAM(s) Oral before breakfast  QUEtiapine 300 milliGRAM(s) Oral <User Schedule>  QUEtiapine 400 milliGRAM(s) Oral <User Schedule>    MEDICATIONS  (PRN):  acetaminophen     Tablet .. 650 milliGRAM(s) Oral every 6 hours PRN Temp greater or equal to 38C (100.4F), Mild Pain (1 - 3)  ALPRAZolam 0.5 milliGRAM(s) Oral every 8 hours PRN anxiety/restlessness.  aluminum hydroxide/magnesium hydroxide/simethicone Suspension 30 milliLiter(s) Oral every 4 hours PRN Dyspepsia  hydrOXYzine hydrochloride 25 milliGRAM(s) Oral every 6 hours PRN Restlessness/Sleep  melatonin 3 milliGRAM(s) Oral at bedtime PRN Insomnia  ondansetron Injectable 4 milliGRAM(s) IV Push every 8 hours PRN Nausea and/or Vomiting  sodium chloride 0.65% Nasal 1 Spray(s) Both Nostrils daily PRN Nasal Congestion    Vital Signs Last 24 Hrs  T(F): --  HR: 95 (10 Dec 2024 18:58) (95 - 95)  BP: 136/93 (10 Dec 2024 18:58) (136/93 - 136/93)  RR: 16 (10 Dec 2024 18:58) (16 - 16)  SpO2: 97% (10 Dec 2024 18:58) (97% - 97%)  I&O's Summary    PHYSICAL EXAM:  General: NAD  ENT: MMM, no scleral icterus  Neck: Supple, No JVD  Lungs: Clear to auscultation bilaterally, no wheezes, rales, rhonchi  Cardio: RRR, S1/S2  Abdomen: Soft, Nontender, Nondistended; Bowel sounds present  Extremities: No calf tenderness, No pitting edema      LABS:                                                  RADIOLOGY & ADDITIONAL TESTS:    Care Discussed with Consultants/Other Providers: yes, psych
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
## 2024-12-11 NOTE — CHART NOTE - NSCHARTNOTEFT_GEN_A_CORE
NUTRITION Follow Up Note    SOURCE: Patient [X]  Medical Record [X]  Nursing Staff [X]  Family Member []    DIET: Diet, Regular (10-21-24 @ 12:13) [Active]    Patient noted with fair-good appetite, consuming % of meals per nursing flowsheets. No issues with chewing/swallowing. Food preferences obtained and noted on patients file with nutrition office.     EDEMA: no edema noted    LAST BM: 12/10/24    SKIN: no pressure injuries noted    WEIGHT TRENDS: 93 kG (10/20/24), 99.6 kG (11/22/24)      PERTINENT MEDICATIONS: MEDICATIONS  (STANDING):  lithium SR (LITHOBID) 300 milliGRAM(s) Oral <User Schedule>  lithium SR (LITHOBID) 300 milliGRAM(s) Oral <User Schedule>  pantoprazole    Tablet 40 milliGRAM(s) Oral before breakfast  QUEtiapine 300 milliGRAM(s) Oral <User Schedule>  QUEtiapine 400 milliGRAM(s) Oral <User Schedule>    MEDICATIONS  (PRN):  acetaminophen     Tablet .. 650 milliGRAM(s) Oral every 6 hours PRN Temp greater or equal to 38C (100.4F), Mild Pain (1 - 3)  ALPRAZolam 0.5 milliGRAM(s) Oral every 8 hours PRN anxiety/restlessness.  aluminum hydroxide/magnesium hydroxide/simethicone Suspension 30 milliLiter(s) Oral every 4 hours PRN Dyspepsia  hydrOXYzine hydrochloride 25 milliGRAM(s) Oral every 6 hours PRN Restlessness/Sleep  melatonin 3 milliGRAM(s) Oral at bedtime PRN Insomnia  ondansetron Injectable 4 milliGRAM(s) IV Push every 8 hours PRN Nausea and/or Vomiting  sodium chloride 0.65% Nasal 1 Spray(s) Both Nostrils daily PRN Nasal Congestion      PERTINENT LABS:          ESTIMATED NEEDS:   [X] No Change Since Previous Assessment    PREVIOUS NUTRITION DIAGNOSIS:  No active nutrition Dx at this present time    NUTRITION DIAGNOSIS IS [X] Ongoing     NEW NUTRITION DIAGNOSIS: [X] Not Applicable    INTERVENTIONS:   1. Recommend continue current nutrition plan of care as tolerated     MONITORING & EVALUATION:   1. Weights   2. PO intakes   3. Skin integrity   4. Tolerance to diet prescription   5. Labs & POCT  6. Follow up (per protocol)    Registered Dietitian/Nutritionist Remains Available.  Fermin Mensah RD, CDN    Contact: [phone] or via [place] TEAMS
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
## 2024-12-12 PROCEDURE — 99232 SBSQ HOSP IP/OBS MODERATE 35: CPT

## 2024-12-12 PROCEDURE — 99233 SBSQ HOSP IP/OBS HIGH 50: CPT

## 2024-12-12 RX ADMIN — PANTOPRAZOLE SODIUM 40 MILLIGRAM(S): 40 TABLET, DELAYED RELEASE ORAL at 10:14

## 2024-12-12 RX ADMIN — LITHIUM CARBONATE 300 MILLIGRAM(S): 300 CAPSULE ORAL at 19:50

## 2024-12-12 RX ADMIN — Medication 300 MILLIGRAM(S): at 10:14

## 2024-12-12 RX ADMIN — Medication 400 MILLIGRAM(S): at 19:50

## 2024-12-12 RX ADMIN — HYDROXYZINE HYDROCHLORIDE 25 MILLIGRAM(S): 25 TABLET, FILM COATED ORAL at 14:22

## 2024-12-12 RX ADMIN — LITHIUM CARBONATE 300 MILLIGRAM(S): 300 CAPSULE ORAL at 11:29

## 2024-12-12 NOTE — BH CONSULTATION LIAISON PROGRESS NOTE - NSBHCONSULTRECOMMENDOTHER_PSY_A_CORE FT
- Continue with Lithium SR 600mg daily; 300mg qhs:   - Continue with Seroquel 300mg daily; 400mg qhs.  - Per psychiatric attending, patient will require Xanax 0.5mg PRN moving forward. Forms will be completed and send over to [facility] pending psychiatric attending signature.      * Patient should receive Haldol 5mg + Ativan 4mg + Benadryl 50mg about 45 minutes before transfer to crisis housing to mitigate against any severe agitation/combativeness that will likely occur.

## 2024-12-12 NOTE — PROGRESS NOTE ADULT - TIME BILLING
Time spent includes direct patient care  (interview and examination of patient), discussion with other providers, support staff and/or patient's family members, review of medical records, ordering diagnostic tests and analyzing results, and documentation.
Time spent includes direct patient care  (interview and examination of patient), discussion with other providers, support staff and/or patient's family members, review of medical records, ordering diagnostic tests and analyzing results, and documentation.
Time spent includes direct patient care (interview and examination of patient), discussion with other providers, support staff and/or patient's family members, review of medical records, ordering diagnostic tests and analyzing results, and documentation
Time spent includes direct patient care (interview and examination of patient), discussion with other providers, support staff and/or patient's family members, review of medical records, ordering diagnostic tests and analyzing results, and documentation
Time spent includes direct patient care  (interview and examination of patient), discussion with other providers, support staff and/or patient's family members, review of medical records, ordering diagnostic tests and analyzing results, and documentation.
Time spent includes direct patient care (interview and examination of patient), discussion with other providers, support staff and/or patient's family members, review of medical records, ordering diagnostic tests and analyzing results, and documentation
Time spent includes direct patient care (interview and examination of patient), discussion with other providers, support staff and/or patient's family members, review of medical records, ordering diagnostic tests and analyzing results, and documentation
- Ordering, reviewing, and interpreting labs, testing, and imaging.  - Independently obtaining a review of systems and performing a physical exam  - Reviewing consultant documentation/recommendations.  - Counselling and educating patient regarding interpretation of aforementioned items and plan of care.
Time spent includes direct patient care  (interview and examination of patient), discussion with other providers, support staff and/or patient's family members, review of medical records, ordering diagnostic tests and analyzing results, and documentation.
Time spent includes direct patient care (interview and examination of patient), discussion with other providers, support staff and/or patient's family members, review of medical records, ordering diagnostic tests and analyzing results, and documentation
Time spent includes direct patient care (interview and examination of patient), discussion with other providers, support staff and/or patient's family members, review of medical records, ordering diagnostic tests and analyzing results, and documentation
Time spent includes direct patient care  (interview and examination of patient), discussion with other providers, support staff and/or patient's family members, review of medical records, ordering diagnostic tests and analyzing results, and documentation, excluding time spent in ACP discussions.
Time spent includes direct patient care  (interview and examination of patient), discussion with other providers, support staff and/or patient's family members, review of medical records, ordering diagnostic tests and analyzing results, and documentation.
Time spent includes direct patient care (interview and examination of patient), discussion with other providers, support staff and/or patient's family members, review of medical records, ordering diagnostic tests and analyzing results, and documentation
Time spent includes direct patient care  (interview and examination of patient), discussion with other providers, support staff and/or patient's family members, review of medical records, ordering diagnostic tests and analyzing results, and documentation.
Time spent includes direct patient care  (interview and examination of patient), discussion with other providers, support staff and/or patient's family members, review of medical records, ordering diagnostic tests and analyzing results, and documentation, excluding time spent in ACP discussions.
Time spent includes direct patient care  (interview and examination of patient), discussion with other providers, support staff and/or patient's family members, review of medical records, ordering diagnostic tests and analyzing results, and documentation.
Time spent includes direct patient care  (interview and examination of patient), discussion with other providers, support staff and/or patient's family members, review of medical records, ordering diagnostic tests and analyzing results, and documentation, excluding time spent in ACP discussions.
Time spent includes direct patient care  (interview and examination of patient), discussion with other providers, support staff and/or patient's family members, review of medical records, ordering diagnostic tests and analyzing results, and documentation.
Time spent includes direct patient care  (interview and examination of patient), discussion with other providers, support staff and/or patient's family members, review of medical records, ordering diagnostic tests and analyzing results, and documentation, excluding time spent in ACP discussions.
Time spent includes direct patient care  (interview and examination of patient), discussion with other providers, support staff and/or patient's family members, review of medical records, ordering diagnostic tests and analyzing results, and documentation.
Time spent includes direct patient care  (interview and examination of patient), discussion with other providers, support staff and/or patient's family members, review of medical records, ordering diagnostic tests and analyzing results, and documentation.
Time spent includes direct patient care  (interview and examination of patient), discussion with other providers, support staff and/or patient's family members, review of medical records, ordering diagnostic tests and analyzing results, and documentation, excluding time spent in ACP discussions.

## 2024-12-12 NOTE — PROGRESS NOTE ADULT - ASSESSMENT
18 y/o M with PMH of autism, ADHD, history of episodes of agitation and aggression brought in by EMS from home for agitation.    Agitation/Aggression with hx of ADHD, autism  - Had a multidisciplinary meeting today with psychiatry, CM, nurse management regarding events from overnight  -Spoke with mom Skye as well  - Waiting for placement  -Continue 1:1  -Continue Lithium 600mg daily, 300mg qhs - changed to lithium 300mg tabs: 2 tabs in afternoon, 1 tab qhs per outpatient pharmacy request  -Continue Seroquel 300mg daily, 400mg qhs  -Continue Xanax 0.5mg Q8H PRN anxiety/restlessness - Psych appreciated for form completion  -Psych following, pending acceptance to [facility]/crisis housing. suggested use of vistaril (hydroxyzine 25mg q6h, prn anxiety as an alternative to benzos for for sleep)  -Klonopin 0.5mg at 6PM dc'd, hydroxyzine 25mg q6h prn started 12/7 - will not require upon discharge  -Repeat lithium level 12/9 reviewed  -Appreciate Social Work involvement     DVT PPx  -Patient is ambulatory    Dispo - Pending placement - no medical contraindications to discharge. discharge medications sent to preferred pharmacy.    -QuantiFeron gold negative 11/29 12/12 - Father updated at bedside. All questions/concerns addressed

## 2024-12-12 NOTE — BH CONSULTATION LIAISON PROGRESS NOTE - NSBHCONSULTMEDSEVERE_PSY_A_CORE FT
Haldol 5 mg IM q 8 h prn. 
Ativan 2 mg IM q 4 h
Haldol 5 mg IM q 8 h prn. 
Haldol 5mg + Ativan 4mg + Benadryl 50mg STAT for any agitation/combativeness. 
Ativan 2 mg IM
Haldol 5 mg IM q 8 h prn. 
Ativan 2 mg IM
Haldol 5 mg IM q 8 h prn. 
ATivan 2 mg IVP or IM
Haldol 5 mg IM q 8 h prn. 
Ativan 2 mg IM

## 2024-12-12 NOTE — BH CONSULTATION LIAISON PROGRESS NOTE - NSBHINDICATION_PSY_ALL_CORE
poor impulse control and frustration tolerance. 
1:1 for AWOL risk. 
agitation, poor impulse control , poor frustration tolerance. 
impulsivity, awol risk. 
impulsivity, agitation.   If no events overnight, can downgrade to routine observation. 
1:1 for AWOL risk. 
impulsivity agitation. 
poor impulse control and frustration tolerance. 
impulsivity, aggression. 
impulsivity, agitation. 
1:1 for AWOL risk. 
1:1 for AWOL risk. 
impulsivity, agitation. 
1:1 for AWOL risk. 
1:1 for AWOL risk.
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
## 2024-12-12 NOTE — BH CONSULTATION LIAISON PROGRESS NOTE - ADDITIONAL PSYCHIATRIC MEDICATIONS
Lithium SR 300mg daily; 300mg qhs.  Seroquel 300mg daily  Seroquel 400mg qhs Lithium SR 300mg daily; 300mg qhs.  Seroquel 300mg daily  Seroquel 400mg qhs    Previous Lithium level 0.7 , current level 0.52, would continue same dosage, current level may reflect hydration status.   No signs/sx of Lithium toxicity.
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
## 2024-12-12 NOTE — BH CONSULTATION LIAISON PROGRESS NOTE - NSBHTIMEACTIVITIESPERFORMED_PSY_A_CORE
Multiple interdisciplinary team rounds, chart review, patient interview, and coordination with [facility]/[address] crisis.

## 2024-12-12 NOTE — BH CONSULTATION LIAISON PROGRESS NOTE - ATTENDING COMMENTS
Pt evaluated with psychiatric NP on rounds, pt continues to demonstrate acting out behavior, became combative to nursing supervisor, as well being destructive towards property in his room. Pt impulse control poor and insight poor as well. Plan to pursue inpatient care for medication management and safety of self and others.  
Team meeting held and crisis housing and parents updated regarding  latest incidence of aggression. Plan is for discharge to appropriate setting.   No medication changes recommended, Propranolol can be started as per Dr. Kasper ( behavioral pediatrician) should pt need further med management for aggressive behavior, will defer starting new medication prior to discharge.

## 2024-12-13 ENCOUNTER — TRANSCRIPTION ENCOUNTER (OUTPATIENT)
Age: 19
End: 2024-12-13

## 2024-12-13 PROCEDURE — 85027 COMPLETE CBC AUTOMATED: CPT

## 2024-12-13 PROCEDURE — 99231 SBSQ HOSP IP/OBS SF/LOW 25: CPT

## 2024-12-13 PROCEDURE — 86480 TB TEST CELL IMMUN MEASURE: CPT

## 2024-12-13 PROCEDURE — 84439 ASSAY OF FREE THYROXINE: CPT

## 2024-12-13 PROCEDURE — 36415 COLL VENOUS BLD VENIPUNCTURE: CPT

## 2024-12-13 PROCEDURE — 93005 ELECTROCARDIOGRAM TRACING: CPT

## 2024-12-13 PROCEDURE — 99285 EMERGENCY DEPT VISIT HI MDM: CPT

## 2024-12-13 PROCEDURE — 80053 COMPREHEN METABOLIC PANEL: CPT

## 2024-12-13 PROCEDURE — 84100 ASSAY OF PHOSPHORUS: CPT

## 2024-12-13 PROCEDURE — 84443 ASSAY THYROID STIM HORMONE: CPT

## 2024-12-13 PROCEDURE — 80178 ASSAY OF LITHIUM: CPT

## 2024-12-13 PROCEDURE — 80307 DRUG TEST PRSMV CHEM ANLYZR: CPT

## 2024-12-13 PROCEDURE — 70450 CT HEAD/BRAIN W/O DYE: CPT | Mod: MC

## 2024-12-13 PROCEDURE — 80048 BASIC METABOLIC PNL TOTAL CA: CPT

## 2024-12-13 PROCEDURE — 99239 HOSP IP/OBS DSCHRG MGMT >30: CPT

## 2024-12-13 PROCEDURE — 85025 COMPLETE CBC W/AUTO DIFF WBC: CPT

## 2024-12-13 PROCEDURE — 83735 ASSAY OF MAGNESIUM: CPT

## 2024-12-13 PROCEDURE — 87635 SARS-COV-2 COVID-19 AMP PRB: CPT

## 2024-12-13 RX ORDER — LITHIUM CARBONATE 300 MG/1
2 CAPSULE ORAL
Qty: 60 | Refills: 0
Start: 2024-12-13 | End: 2025-01-11

## 2024-12-13 RX ORDER — HALOPERIDOL 2 MG
5 TABLET ORAL ONCE
Refills: 0 | Status: COMPLETED | OUTPATIENT
Start: 2024-12-13 | End: 2024-12-13

## 2024-12-13 RX ORDER — DIPHENHYDRAMINE HCL 25 MG
50 CAPSULE ORAL ONCE
Refills: 0 | Status: COMPLETED | OUTPATIENT
Start: 2024-12-13 | End: 2024-12-13

## 2024-12-13 RX ORDER — LORAZEPAM 2 MG/1
2 TABLET ORAL ONCE
Refills: 0 | Status: DISCONTINUED | OUTPATIENT
Start: 2024-12-13 | End: 2024-12-13

## 2024-12-13 RX ADMIN — Medication 50 MILLIGRAM(S): at 09:02

## 2024-12-13 RX ADMIN — Medication 300 MILLIGRAM(S): at 08:44

## 2024-12-13 RX ADMIN — LORAZEPAM 2 MILLIGRAM(S): 2 TABLET ORAL at 09:03

## 2024-12-13 RX ADMIN — Medication 5 MILLIGRAM(S): at 09:03

## 2024-12-13 RX ADMIN — PANTOPRAZOLE SODIUM 40 MILLIGRAM(S): 40 TABLET, DELAYED RELEASE ORAL at 05:38

## 2024-12-13 NOTE — BH CONSULTATION LIAISON PROGRESS NOTE - NSBHASSESSMENTFT_PSY_ALL_CORE
19M domiciled with mother and father, has PPHx of autism, ADHD, no past psychiatric hospitalizations, no past SAs, no past NSSIB; + hx of physical aggression (punching, kicking) toward parents when he is frustrated, police called and patient brought to [place] ED.     Patient followed by C-L service while pending placement with [facility]; with adjustments to medications including Seroquel and Lithium. Alprazolam prn continued to be used at times patient had significant aggression warranting an overnight stay in CCU, and [facility] / [employer] , pt agitation responded to Haldol, Ativan, Benadryl prn, however pt still at risk for aggression due to poor impulse control and poor frustration tolerance. Insight limited by developmental delay and intermittent reinforcement- pt however willing to make the  transition to Crisis housing.   Inpatient psychiatric treatment seen as inappropriate due to his intermittent explosive disorder as well as medication optimization.   Team collaborated with parents and outpatient provider Dr. Kasper during the course of his treatment.

## 2024-12-13 NOTE — DISCHARGE NOTE NURSING/CASE MANAGEMENT/SOCIAL WORK - PATIENT PORTAL LINK FT
You can access the FollowMyHealth Patient Portal offered by [facility] by registering at the following website: [email or web address]. By joining [facility]’s FollowMyHealth portal, you will also be able to view your health information using other applications (apps) compatible with our system.
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
## 2024-12-13 NOTE — BH CONSULTATION LIAISON PROGRESS NOTE - NSICDXBHSECONDARYDX_PSY_ALL_CORE
Intermittent explosive disorder   F63.81  

## 2024-12-13 NOTE — BH CONSULTATION LIAISON PROGRESS NOTE - NSBHMSETHTCONTENT_PSY_A_CORE
upset regarding discharge planning as well as being limited to his room./Preoccupations
Preoccupations
Other
Ideas of reference
Unremarkable
upset regarding discharge planning as well as being limited to his room./Preoccupations
Unremarkable
Other
upset regarding discharge planning as well as being limited to his room./Preoccupations
Does not have a specific plan, now states he is undeserving of happiness./Suicidality
Unremarkable
upset regarding discharge planning as well as being limited to his room./Preoccupations
Unremarkable
Unremarkable
Does not have a specific plan, just states he hates himself and wants to die./Suicidality

## 2024-12-13 NOTE — CHART NOTE - NSCHARTNOTESELECT_GEN_ALL_CORE
Event Note
Nutrition Services
Event Note
Nutrition Services
SW Note/Event Note

## 2024-12-13 NOTE — BH CONSULTATION LIAISON PROGRESS NOTE - NSBHMSESPEECH_PSY_A_CORE
at baseline has a lisp./Normal volume, rate, productivity, spontaneity and articulation
has a lisp at baseline./Normal volume, rate, productivity, spontaneity and articulation
has a lisp at baseline./Normal volume, rate, productivity, spontaneity and articulation
has a lisp./Normal volume, rate, productivity, spontaneity and articulation
has a lisp at baseline./Normal volume, rate, productivity, spontaneity and articulation
lisp at baseline./Normal volume, rate, productivity, spontaneity and articulation
at baseline has a lisp./Normal volume, rate, productivity, spontaneity and articulation
has a lisp at baseline./Normal volume, rate, productivity, spontaneity and articulation
has a lisp./Normal volume, rate, productivity, spontaneity and articulation
has a lisp at baseline./Normal volume, rate, productivity, spontaneity and articulation
Non-verbal: unable to assess speech further
has a lisp at baseline./Normal volume, rate, productivity, spontaneity and articulation
has a lisp at baseline./Normal volume, rate, productivity, spontaneity and articulation
Abnormal as indicated, otherwise normal...
has a lisp./Normal volume, rate, productivity, spontaneity and articulation
at baseline has a lisp./Normal volume, rate, productivity, spontaneity and articulation
has a lisp at baseline./Normal volume, rate, productivity, spontaneity and articulation
has a lisp./Normal volume, rate, productivity, spontaneity and articulation
Abnormal as indicated, otherwise normal...

## 2024-12-13 NOTE — BH CONSULTATION LIAISON PROGRESS NOTE - NSBHPTASSESSDT_PSY_A_CORE
22-Nov-2024 13:53
04-Dec-2024 11:00
24-Oct-2024 17:37
10-Dec-2024 10:30
06-Dec-2024 19:33
09-Nov-2024 19:45
29-Nov-2024 23:01
20-Nov-2024 18:42
30-Nov-2024 19:03
22-Oct-2024 11:25
07-Dec-2024 11:38
10-Nov-2024 19:38
08-Nov-2024 17:14
11-Nov-2024 18:37
07-Nov-2024 19:42
13-Dec-2024 11:25
23-Nov-2024 15:46
03-Dec-2024 11:30
12-Dec-2024 13:00

## 2024-12-13 NOTE — PROGRESS NOTE ADULT - PROVIDER SPECIALTY LIST ADULT
Hospitalist
Critical Care
Hospitalist

## 2024-12-13 NOTE — BH CONSULTATION LIAISON PROGRESS NOTE - GENERAL APPEARANCE
Developmentally delayed/No deformities present
Developmentally delayed
No deformities present
Developmentally delayed/No deformities present
No deformities present
No deformities present
Developmentally delayed
No deformities present
Developmentally delayed/No deformities present
No deformities present
Developmentally delayed/No deformities present
Developmentally delayed/No deformities present
Developmentally delayed
Developmentally delayed
No deformities present

## 2024-12-13 NOTE — BH CONSULTATION LIAISON PROGRESS NOTE - NSBHMSEBEHAV_PSY_A_CORE
Cooperative
Uncooperative
Cooperative

## 2024-12-13 NOTE — BH CONSULTATION LIAISON PROGRESS NOTE - NSBHMSEATTEN_PSY_A_CORE
Impaired
Normal
Impaired
Impaired
Normal
Impaired
Normal
Normal
Impaired
Normal

## 2024-12-13 NOTE — PROGRESS NOTE ADULT - SUBJECTIVE AND OBJECTIVE BOX
Patient is a 19y old  Male who presents with a chief complaint of Placement    No events overnight  REports feeling well      Patient seen and examined at bedside.    ALLERGIES:  No Known Allergies    MEDICATIONS  (STANDING):  lithium SR (LITHOBID) 300 milliGRAM(s) Oral <User Schedule>  lithium SR (LITHOBID) 300 milliGRAM(s) Oral <User Schedule>  pantoprazole    Tablet 40 milliGRAM(s) Oral before breakfast  QUEtiapine 400 milliGRAM(s) Oral <User Schedule>  QUEtiapine 300 milliGRAM(s) Oral <User Schedule>    MEDICATIONS  (PRN):  acetaminophen     Tablet .. 650 milliGRAM(s) Oral every 6 hours PRN Temp greater or equal to 38C (100.4F), Mild Pain (1 - 3)  aluminum hydroxide/magnesium hydroxide/simethicone Suspension 30 milliLiter(s) Oral every 4 hours PRN Dyspepsia  hydrOXYzine hydrochloride 25 milliGRAM(s) Oral every 6 hours PRN Restlessness/Sleep  melatonin 3 milliGRAM(s) Oral at bedtime PRN Insomnia  ondansetron Injectable 4 milliGRAM(s) IV Push every 8 hours PRN Nausea and/or Vomiting  sodium chloride 0.65% Nasal 1 Spray(s) Both Nostrils daily PRN Nasal Congestion    Vital Signs Last 24 Hrs  T(F): --  HR: --  BP: --  RR: --  SpO2: --  I&O's Summary      PHYSICAL EXAM:  General: NAD, A/O x 3  ENT: MMM, no scleral icterus  Neck: Supple, No JVD, no thyroidomegaly  Lungs: Clear to auscultation bilaterally, no wheezes, no rales, no rhonchi, good inspiratory effort  Cardio: RRR, S1/S2, No murmurs  Abdomen: Soft, Nontender, Nondistended; Bowel sounds present  Extremities: No calf tenderness, No pitting edema, no skin changes    LABS:

## 2024-12-13 NOTE — BH CONSULTATION LIAISON PROGRESS NOTE - NSBHMSEKNOWHOW_PSY_ALL_CORE
Current Events/Educational attainment/Vocabulary
Current Events/Educational attainment/Vocabulary
Educational attainment/Vocabulary
Current Events
Current Events
Current Events/Educational attainment/Vocabulary
Current Events
Current Events/Educational attainment/Vocabulary
Current Events
Current Events/Educational attainment/Vocabulary
Current Events
Current Events/Educational attainment/Vocabulary
Current Events
Current Events

## 2024-12-13 NOTE — BH CONSULTATION LIAISON PROGRESS NOTE - NSBHMSEREMMEM_PSY_A_CORE
Normal
Impaired
Normal
Impaired
Normal
Normal

## 2024-12-13 NOTE — BH CONSULTATION LIAISON PROGRESS NOTE - NSBHMSEAFFRANGE_PSY_A_CORE
Full
Constricted
Full
Constricted
Other
Constricted
Constricted
Full
Constricted
Constricted
Full
Full
Blunted
Other

## 2024-12-13 NOTE — BH CONSULTATION LIAISON PROGRESS NOTE - NSBHMSEAFFQUAL_PSY_A_CORE
Anxious
Euthymic
Anxious
Other
Euthymic
Euthymic
Anxious
Euthymic
Other
Other
Euthymic
Other
Euthymic
Irritable
Other

## 2024-12-13 NOTE — BH CONSULTATION LIAISON PROGRESS NOTE - NSICDXBHPRIMARYDX_PSY_ALL_CORE
Autism spectrum   F84.0  

## 2024-12-13 NOTE — BH CONSULTATION LIAISON PROGRESS NOTE - NSBHCONSULTMEDPRNREASON_PSY_A_CORE
agitation.../severe agitation...
agitation...
anxiety.../agitation.../severe agitation...
agitation.../severe agitation...
anxiety.../agitation.../severe agitation...
severe agitation...
anxiety.../severe agitation...
anxiety.../agitation.../severe agitation...
anxiety...
anxiety.../severe agitation...
anxiety.../severe agitation...
agitation...
agitation.../severe agitation...
anxiety.../agitation.../severe agitation...
anxiety.../agitation.../severe agitation...
agitation.../severe agitation...
agitation...

## 2024-12-13 NOTE — BH CONSULTATION LIAISON PROGRESS NOTE - NSBHCONSULTFOLLOWAFTERCARE_PSY_A_CORE FT
Aftercare dependent on geography, however pt does have a psychiatric provider Dr. Kasper.  [phone].
Pt to have psychiatric services provided by [facility].   For emergency concerns pt can be seen at [place] [facility].   
Patient to be discharged to crisis housing in [facility] tomorrow.   ([address] [place] [address], [place] [address])

## 2024-12-13 NOTE — BH CONSULTATION LIAISON PROGRESS NOTE - CURRENT MEDICATION
MEDICATIONS  (STANDING):  desvenlafaxine ER 50 milliGRAM(s) Oral daily  lithium SR (LITHOBID) 600 milliGRAM(s) Oral daily  QUEtiapine 300 milliGRAM(s) Oral two times a day    MEDICATIONS  (PRN):  acetaminophen     Tablet .. 650 milliGRAM(s) Oral every 6 hours PRN Temp greater or equal to 38C (100.4F), Mild Pain (1 - 3)  aluminum hydroxide/magnesium hydroxide/simethicone Suspension 30 milliLiter(s) Oral every 4 hours PRN Dyspepsia  LORazepam   Injectable 2 milliGRAM(s) IV Push once PRN Agitation  melatonin 3 milliGRAM(s) Oral at bedtime PRN Insomnia  ondansetron Injectable 4 milliGRAM(s) IV Push every 8 hours PRN Nausea and/or Vomiting  sodium chloride 0.65% Nasal 1 Spray(s) Both Nostrils daily PRN Nasal Congestion  
MEDICATIONS  (STANDING):  desvenlafaxine  milliGRAM(s) Oral daily  lithium SR (LITHOBID) 600 milliGRAM(s) Oral daily  PPD  5 Tuberculin Unit(s) Injectable 5 Unit(s) IntraDermal once  QUEtiapine 300 milliGRAM(s) Oral two times a day    MEDICATIONS  (PRN):  acetaminophen     Tablet .. 650 milliGRAM(s) Oral every 6 hours PRN Temp greater or equal to 38C (100.4F), Mild Pain (1 - 3)  aluminum hydroxide/magnesium hydroxide/simethicone Suspension 30 milliLiter(s) Oral every 4 hours PRN Dyspepsia  melatonin 3 milliGRAM(s) Oral at bedtime PRN Insomnia  ondansetron Injectable 4 milliGRAM(s) IV Push every 8 hours PRN Nausea and/or Vomiting  sodium chloride 0.65% Nasal 1 Spray(s) Both Nostrils daily PRN Nasal Congestion  
MEDICATIONS  (STANDING):  lithium SR (LITHOBID) 300 milliGRAM(s) Oral <User Schedule>  lithium SR (LITHOBID) 300 milliGRAM(s) Oral <User Schedule>  pantoprazole    Tablet 40 milliGRAM(s) Oral before breakfast  QUEtiapine 400 milliGRAM(s) Oral <User Schedule>  QUEtiapine 300 milliGRAM(s) Oral <User Schedule>    MEDICATIONS  (PRN):  acetaminophen     Tablet .. 650 milliGRAM(s) Oral every 6 hours PRN Temp greater or equal to 38C (100.4F), Mild Pain (1 - 3)  aluminum hydroxide/magnesium hydroxide/simethicone Suspension 30 milliLiter(s) Oral every 4 hours PRN Dyspepsia  hydrOXYzine hydrochloride 25 milliGRAM(s) Oral every 6 hours PRN Restlessness/Sleep  melatonin 3 milliGRAM(s) Oral at bedtime PRN Insomnia  ondansetron Injectable 4 milliGRAM(s) IV Push every 8 hours PRN Nausea and/or Vomiting  sodium chloride 0.65% Nasal 1 Spray(s) Both Nostrils daily PRN Nasal Congestion  
MEDICATIONS  (STANDING):  lithium SR (LITHOBID) 300 milliGRAM(s) Oral <User Schedule>  lithium SR (LITHOBID) 300 milliGRAM(s) Oral <User Schedule>  pantoprazole    Tablet 40 milliGRAM(s) Oral before breakfast  QUEtiapine 300 milliGRAM(s) Oral <User Schedule>  QUEtiapine 400 milliGRAM(s) Oral <User Schedule>    MEDICATIONS  (PRN):  acetaminophen     Tablet .. 650 milliGRAM(s) Oral every 6 hours PRN Temp greater or equal to 38C (100.4F), Mild Pain (1 - 3)  ALPRAZolam 0.5 milliGRAM(s) Oral every 8 hours PRN anxiety/restlessness.  aluminum hydroxide/magnesium hydroxide/simethicone Suspension 30 milliLiter(s) Oral every 4 hours PRN Dyspepsia  hydrOXYzine hydrochloride 25 milliGRAM(s) Oral every 6 hours PRN Restlessness/Sleep  melatonin 3 milliGRAM(s) Oral at bedtime PRN Insomnia  ondansetron Injectable 4 milliGRAM(s) IV Push every 8 hours PRN Nausea and/or Vomiting  sodium chloride 0.65% Nasal 1 Spray(s) Both Nostrils daily PRN Nasal Congestion  
MEDICATIONS  (STANDING):  lithium SR (LITHOBID) 600 milliGRAM(s) Oral daily  lithium SR (LITHOBID) 300 milliGRAM(s) Oral at bedtime  pantoprazole    Tablet 40 milliGRAM(s) Oral before breakfast  PPD  5 Tuberculin Unit(s) Injectable 5 Unit(s) IntraDermal once  QUEtiapine 300 milliGRAM(s) Oral <User Schedule>  QUEtiapine 400 milliGRAM(s) Oral <User Schedule>    MEDICATIONS  (PRN):  acetaminophen     Tablet .. 650 milliGRAM(s) Oral every 6 hours PRN Temp greater or equal to 38C (100.4F), Mild Pain (1 - 3)  ALPRAZolam 0.5 milliGRAM(s) Oral every 6 hours PRN anxiety/restlessness.  aluminum hydroxide/magnesium hydroxide/simethicone Suspension 30 milliLiter(s) Oral every 4 hours PRN Dyspepsia  LORazepam   Injectable 1 milliGRAM(s) IntraMuscular every 6 hours PRN Threatening behavior  melatonin 3 milliGRAM(s) Oral at bedtime PRN Insomnia  ondansetron Injectable 4 milliGRAM(s) IV Push every 8 hours PRN Nausea and/or Vomiting  sodium chloride 0.65% Nasal 1 Spray(s) Both Nostrils daily PRN Nasal Congestion  
MEDICATIONS  (STANDING):  desvenlafaxine ER 50 milliGRAM(s) Oral daily  lithium SR (LITHOBID) 300 milliGRAM(s) Oral at bedtime  lithium SR (LITHOBID) 600 milliGRAM(s) Oral daily  QUEtiapine 300 milliGRAM(s) Oral two times a day    MEDICATIONS  (PRN):  acetaminophen     Tablet .. 650 milliGRAM(s) Oral every 6 hours PRN Temp greater or equal to 38C (100.4F), Mild Pain (1 - 3)  aluminum hydroxide/magnesium hydroxide/simethicone Suspension 30 milliLiter(s) Oral every 4 hours PRN Dyspepsia  melatonin 3 milliGRAM(s) Oral at bedtime PRN Insomnia  ondansetron Injectable 4 milliGRAM(s) IV Push every 8 hours PRN Nausea and/or Vomiting  sodium chloride 0.65% Nasal 1 Spray(s) Both Nostrils daily PRN Nasal Congestion  
MEDICATIONS  (STANDING):  desvenlafaxine ER 50 milliGRAM(s) Oral daily  lithium SR (LITHOBID) 300 milliGRAM(s) Oral at bedtime  lithium SR (LITHOBID) 600 milliGRAM(s) Oral daily  pantoprazole    Tablet 40 milliGRAM(s) Oral before breakfast  QUEtiapine 300 milliGRAM(s) Oral two times a day    MEDICATIONS  (PRN):  acetaminophen     Tablet .. 650 milliGRAM(s) Oral every 6 hours PRN Temp greater or equal to 38C (100.4F), Mild Pain (1 - 3)  aluminum hydroxide/magnesium hydroxide/simethicone Suspension 30 milliLiter(s) Oral every 4 hours PRN Dyspepsia  melatonin 3 milliGRAM(s) Oral at bedtime PRN Insomnia  ondansetron Injectable 4 milliGRAM(s) IV Push every 8 hours PRN Nausea and/or Vomiting  sodium chloride 0.65% Nasal 1 Spray(s) Both Nostrils daily PRN Nasal Congestion  
MEDICATIONS  (STANDING):  chlorhexidine 2% Cloths 1 Application(s) Topical <User Schedule>  desvenlafaxine ER 50 milliGRAM(s) Oral daily  lithium SR (LITHOBID) 300 milliGRAM(s) Oral at bedtime  lithium SR (LITHOBID) 600 milliGRAM(s) Oral daily  pantoprazole    Tablet 40 milliGRAM(s) Oral before breakfast  QUEtiapine 300 milliGRAM(s) Oral two times a day    MEDICATIONS  (PRN):  acetaminophen     Tablet .. 650 milliGRAM(s) Oral every 6 hours PRN Temp greater or equal to 38C (100.4F), Mild Pain (1 - 3)  aluminum hydroxide/magnesium hydroxide/simethicone Suspension 30 milliLiter(s) Oral every 4 hours PRN Dyspepsia  melatonin 3 milliGRAM(s) Oral at bedtime PRN Insomnia  ondansetron Injectable 4 milliGRAM(s) IV Push every 8 hours PRN Nausea and/or Vomiting  sodium chloride 0.65% Nasal 1 Spray(s) Both Nostrils daily PRN Nasal Congestion  
MEDICATIONS  (STANDING):  clonazePAM  Tablet 0.5 milliGRAM(s) Oral <User Schedule>  lithium SR (LITHOBID) 300 milliGRAM(s) Oral at bedtime  lithium SR (LITHOBID) 600 milliGRAM(s) Oral daily  pantoprazole    Tablet 40 milliGRAM(s) Oral before breakfast  QUEtiapine 300 milliGRAM(s) Oral <User Schedule>  QUEtiapine 400 milliGRAM(s) Oral <User Schedule>    MEDICATIONS  (PRN):  acetaminophen     Tablet .. 650 milliGRAM(s) Oral every 6 hours PRN Temp greater or equal to 38C (100.4F), Mild Pain (1 - 3)  ALPRAZolam 0.5 milliGRAM(s) Oral every 8 hours PRN anxiety/restlessness.  aluminum hydroxide/magnesium hydroxide/simethicone Suspension 30 milliLiter(s) Oral every 4 hours PRN Dyspepsia  LORazepam   Injectable 1 milliGRAM(s) IntraMuscular every 6 hours PRN Threatening behavior  melatonin 3 milliGRAM(s) Oral at bedtime PRN Insomnia  ondansetron Injectable 4 milliGRAM(s) IV Push every 8 hours PRN Nausea and/or Vomiting  sodium chloride 0.65% Nasal 1 Spray(s) Both Nostrils daily PRN Nasal Congestion  
MEDICATIONS  (STANDING):  desvenlafaxine ER 50 milliGRAM(s) Oral daily  lithium SR (LITHOBID) 600 milliGRAM(s) Oral daily  QUEtiapine 300 milliGRAM(s) Oral two times a day    MEDICATIONS  (PRN):  acetaminophen     Tablet .. 650 milliGRAM(s) Oral every 6 hours PRN Temp greater or equal to 38C (100.4F), Mild Pain (1 - 3)  aluminum hydroxide/magnesium hydroxide/simethicone Suspension 30 milliLiter(s) Oral every 4 hours PRN Dyspepsia  LORazepam     Tablet 2 milliGRAM(s) Oral once PRN Agitation  LORazepam   Injectable 2 milliGRAM(s) IntraMuscular once PRN Agitation  melatonin 3 milliGRAM(s) Oral at bedtime PRN Insomnia  ondansetron Injectable 4 milliGRAM(s) IV Push every 8 hours PRN Nausea and/or Vomiting  sodium chloride 0.65% Nasal 1 Spray(s) Both Nostrils daily PRN Nasal Congestion  
MEDICATIONS  (STANDING):  clonazePAM  Tablet 0.5 milliGRAM(s) Oral <User Schedule>  lithium SR (LITHOBID) 300 milliGRAM(s) Oral at bedtime  lithium SR (LITHOBID) 600 milliGRAM(s) Oral daily  pantoprazole    Tablet 40 milliGRAM(s) Oral before breakfast  QUEtiapine 300 milliGRAM(s) Oral <User Schedule>  QUEtiapine 400 milliGRAM(s) Oral <User Schedule>    MEDICATIONS  (PRN):  acetaminophen     Tablet .. 650 milliGRAM(s) Oral every 6 hours PRN Temp greater or equal to 38C (100.4F), Mild Pain (1 - 3)  ALPRAZolam 0.5 milliGRAM(s) Oral every 8 hours PRN anxiety/restlessness.  aluminum hydroxide/magnesium hydroxide/simethicone Suspension 30 milliLiter(s) Oral every 4 hours PRN Dyspepsia  LORazepam   Injectable 1 milliGRAM(s) IntraMuscular every 6 hours PRN Threatening behavior  melatonin 3 milliGRAM(s) Oral at bedtime PRN Insomnia  ondansetron Injectable 4 milliGRAM(s) IV Push every 8 hours PRN Nausea and/or Vomiting  sodium chloride 0.65% Nasal 1 Spray(s) Both Nostrils daily PRN Nasal Congestion  
MEDICATIONS  (STANDING):  desvenlafaxine ER 50 milliGRAM(s) Oral daily  lithium SR (LITHOBID) 300 milliGRAM(s) Oral at bedtime  lithium SR (LITHOBID) 600 milliGRAM(s) Oral daily  QUEtiapine 300 milliGRAM(s) Oral two times a day    MEDICATIONS  (PRN):  acetaminophen     Tablet .. 650 milliGRAM(s) Oral every 6 hours PRN Temp greater or equal to 38C (100.4F), Mild Pain (1 - 3)  aluminum hydroxide/magnesium hydroxide/simethicone Suspension 30 milliLiter(s) Oral every 4 hours PRN Dyspepsia  LORazepam     Tablet 2 milliGRAM(s) Oral daily PRN Agitation  LORazepam   Injectable 2 milliGRAM(s) IntraMuscular once PRN Agitation  melatonin 3 milliGRAM(s) Oral at bedtime PRN Insomnia  ondansetron Injectable 4 milliGRAM(s) IV Push every 8 hours PRN Nausea and/or Vomiting  sodium chloride 0.65% Nasal 1 Spray(s) Both Nostrils daily PRN Nasal Congestion  
MEDICATIONS  (STANDING):  desvenlafaxine ER 50 milliGRAM(s) Oral daily  lithium SR (LITHOBID) 600 milliGRAM(s) Oral daily  lithium SR (LITHOBID) 300 milliGRAM(s) Oral at bedtime  QUEtiapine 300 milliGRAM(s) Oral two times a day    MEDICATIONS  (PRN):  acetaminophen     Tablet .. 650 milliGRAM(s) Oral every 6 hours PRN Temp greater or equal to 38C (100.4F), Mild Pain (1 - 3)  aluminum hydroxide/magnesium hydroxide/simethicone Suspension 30 milliLiter(s) Oral every 4 hours PRN Dyspepsia  LORazepam     Tablet 2 milliGRAM(s) Oral daily PRN Agitation  LORazepam   Injectable 2 milliGRAM(s) IntraMuscular once PRN Agitation  melatonin 3 milliGRAM(s) Oral at bedtime PRN Insomnia  ondansetron Injectable 4 milliGRAM(s) IV Push every 8 hours PRN Nausea and/or Vomiting  sodium chloride 0.65% Nasal 1 Spray(s) Both Nostrils daily PRN Nasal Congestion  
MEDICATIONS  (STANDING):  lithium SR (LITHOBID) 300 milliGRAM(s) Oral at bedtime  lithium SR (LITHOBID) 600 milliGRAM(s) Oral daily  pantoprazole    Tablet 40 milliGRAM(s) Oral before breakfast  QUEtiapine 300 milliGRAM(s) Oral <User Schedule>  QUEtiapine 400 milliGRAM(s) Oral <User Schedule>    MEDICATIONS  (PRN):  acetaminophen     Tablet .. 650 milliGRAM(s) Oral every 6 hours PRN Temp greater or equal to 38C (100.4F), Mild Pain (1 - 3)  ALPRAZolam 0.5 milliGRAM(s) Oral every 6 hours PRN anxiety/restlessness.  aluminum hydroxide/magnesium hydroxide/simethicone Suspension 30 milliLiter(s) Oral every 4 hours PRN Dyspepsia  LORazepam   Injectable 1 milliGRAM(s) IntraMuscular every 6 hours PRN Threatening behavior  melatonin 3 milliGRAM(s) Oral at bedtime PRN Insomnia  ondansetron Injectable 4 milliGRAM(s) IV Push every 8 hours PRN Nausea and/or Vomiting  sodium chloride 0.65% Nasal 1 Spray(s) Both Nostrils daily PRN Nasal Congestion  
MEDICATIONS  (STANDING):  lithium SR (LITHOBID) 300 milliGRAM(s) Oral at bedtime  lithium SR (LITHOBID) 600 milliGRAM(s) Oral daily  pantoprazole    Tablet 40 milliGRAM(s) Oral before breakfast  QUEtiapine 300 milliGRAM(s) Oral <User Schedule>  QUEtiapine 400 milliGRAM(s) Oral <User Schedule>    MEDICATIONS  (PRN):  acetaminophen     Tablet .. 650 milliGRAM(s) Oral every 6 hours PRN Temp greater or equal to 38C (100.4F), Mild Pain (1 - 3)  ALPRAZolam 0.5 milliGRAM(s) Oral every 8 hours PRN anxiety/restlessness.  aluminum hydroxide/magnesium hydroxide/simethicone Suspension 30 milliLiter(s) Oral every 4 hours PRN Dyspepsia  hydrOXYzine hydrochloride 25 milliGRAM(s) Oral every 6 hours PRN Restlessness/Sleep  melatonin 3 milliGRAM(s) Oral at bedtime PRN Insomnia  ondansetron Injectable 4 milliGRAM(s) IV Push every 8 hours PRN Nausea and/or Vomiting  sodium chloride 0.65% Nasal 1 Spray(s) Both Nostrils daily PRN Nasal Congestion  
MEDICATIONS  (STANDING):  lithium SR (LITHOBID) 300 milliGRAM(s) Oral at bedtime  lithium SR (LITHOBID) 600 milliGRAM(s) Oral daily  pantoprazole    Tablet 40 milliGRAM(s) Oral before breakfast  QUEtiapine 300 milliGRAM(s) Oral <User Schedule>  QUEtiapine 400 milliGRAM(s) Oral <User Schedule>    MEDICATIONS  (PRN):  acetaminophen     Tablet .. 650 milliGRAM(s) Oral every 6 hours PRN Temp greater or equal to 38C (100.4F), Mild Pain (1 - 3)  ALPRAZolam 0.5 milliGRAM(s) Oral every 6 hours PRN anxiety/restlessness.  aluminum hydroxide/magnesium hydroxide/simethicone Suspension 30 milliLiter(s) Oral every 4 hours PRN Dyspepsia  LORazepam   Injectable 1 milliGRAM(s) IntraMuscular every 6 hours PRN Threatening behavior  melatonin 3 milliGRAM(s) Oral at bedtime PRN Insomnia  ondansetron Injectable 4 milliGRAM(s) IV Push every 8 hours PRN Nausea and/or Vomiting  sodium chloride 0.65% Nasal 1 Spray(s) Both Nostrils daily PRN Nasal Congestion  
MEDICATIONS  (STANDING):  desvenlafaxine ER 50 milliGRAM(s) Oral daily  lithium SR (LITHOBID) 300 milliGRAM(s) Oral at bedtime  lithium SR (LITHOBID) 600 milliGRAM(s) Oral daily  QUEtiapine 300 milliGRAM(s) Oral two times a day    MEDICATIONS  (PRN):  acetaminophen     Tablet .. 650 milliGRAM(s) Oral every 6 hours PRN Temp greater or equal to 38C (100.4F), Mild Pain (1 - 3)  aluminum hydroxide/magnesium hydroxide/simethicone Suspension 30 milliLiter(s) Oral every 4 hours PRN Dyspepsia  LORazepam     Tablet 2 milliGRAM(s) Oral daily PRN Agitation  LORazepam   Injectable 2 milliGRAM(s) IntraMuscular once PRN Agitation  melatonin 3 milliGRAM(s) Oral at bedtime PRN Insomnia  ondansetron Injectable 4 milliGRAM(s) IV Push every 8 hours PRN Nausea and/or Vomiting  sodium chloride 0.65% Nasal 1 Spray(s) Both Nostrils daily PRN Nasal Congestion  
MEDICATIONS  (STANDING):  desvenlafaxine ER 50 milliGRAM(s) Oral daily  lithium SR (LITHOBID) 600 milliGRAM(s) Oral daily  QUEtiapine 300 milliGRAM(s) Oral two times a day    MEDICATIONS  (PRN):  acetaminophen     Tablet .. 650 milliGRAM(s) Oral every 6 hours PRN Temp greater or equal to 38C (100.4F), Mild Pain (1 - 3)  aluminum hydroxide/magnesium hydroxide/simethicone Suspension 30 milliLiter(s) Oral every 4 hours PRN Dyspepsia  melatonin 3 milliGRAM(s) Oral at bedtime PRN Insomnia  ondansetron Injectable 4 milliGRAM(s) IV Push every 8 hours PRN Nausea and/or Vomiting  sodium chloride 0.65% Nasal 1 Spray(s) Both Nostrils daily PRN Nasal Congestion  
MEDICATIONS  (STANDING):  lithium SR (LITHOBID) 300 milliGRAM(s) Oral <User Schedule>  lithium SR (LITHOBID) 300 milliGRAM(s) Oral <User Schedule>  pantoprazole    Tablet 40 milliGRAM(s) Oral before breakfast  QUEtiapine 400 milliGRAM(s) Oral <User Schedule>  QUEtiapine 300 milliGRAM(s) Oral <User Schedule>    MEDICATIONS  (PRN):  acetaminophen     Tablet .. 650 milliGRAM(s) Oral every 6 hours PRN Temp greater or equal to 38C (100.4F), Mild Pain (1 - 3)  aluminum hydroxide/magnesium hydroxide/simethicone Suspension 30 milliLiter(s) Oral every 4 hours PRN Dyspepsia  hydrOXYzine hydrochloride 25 milliGRAM(s) Oral every 6 hours PRN Restlessness/Sleep  melatonin 3 milliGRAM(s) Oral at bedtime PRN Insomnia  ondansetron Injectable 4 milliGRAM(s) IV Push every 8 hours PRN Nausea and/or Vomiting  sodium chloride 0.65% Nasal 1 Spray(s) Both Nostrils daily PRN Nasal Congestion

## 2024-12-13 NOTE — BH CONSULTATION LIAISON PROGRESS NOTE - LEVEL OF CONSCIOUSNESS
Alert
Other
Alert

## 2024-12-13 NOTE — BH CONSULTATION LIAISON PROGRESS NOTE - NSBHFUPINTERVALCCFT_PSY_A_CORE
" I would like to go outside." 
" I'm ok, when am I leaving?" 
"I'm okay." 
"I'm okay." 
" We hashed out our differences." 
" I can't forgive myself." 
" Nice to meet you, I'm feeling sleepy." 
" I just have trust issues." 
" I'm good." 
"I'm okay." 
" Do I have to take these off?" 
" I hate myself."
" I just got mad." 
" I'm mentally drained and exhausted." 
" I have only negative things to say about myself." 
Pt   slept , pacing in a room , does not know why he is in a hospital '' 
Pt resting comfortably. 
" I feel decent." 
"I'm okay."

## 2024-12-13 NOTE — BH CONSULTATION LIAISON PROGRESS NOTE - NSBHADMITIPOBS_PSY_A_CORE
Constant observation
Routine observation
Constant observation
Routine observation
Constant observation
Enhanced supervision
Routine observation
Constant observation
Constant observation
Enhanced supervision
Routine observation
Constant observation

## 2024-12-13 NOTE — BH CONSULTATION LIAISON PROGRESS NOTE - NSBHMSEHYG_PSY_A_CORE
pt beard growing in , dressed casually./Good
Good
Dressed in a hospital gown, has acne on forehead./Fair
Good
Unshaven , typically as per parent pt prefers to be clean shaven./Good
dressed in hospital scrubs./Good
Good

## 2024-12-13 NOTE — BH CONSULTATION LIAISON PROGRESS NOTE - NSBHMSETHTASSOC_PSY_A_CORE
Normal
Loose
Normal
Normal

## 2024-12-13 NOTE — BH CONSULTATION LIAISON PROGRESS NOTE - ADDITIONAL DETAILS / COMMENTS
Pt still has additional documents to sign to pursue crisis housing. 
Insight waxes and wanes, at times is in better control and can be redirected. 
Pt calm, but when upset appears difficult to redirect when home. 
Anxious about the future.

## 2024-12-13 NOTE — BH CONSULTATION LIAISON PROGRESS NOTE - NSBHMSEBODY_PSY_A_CORE
muscular/Average build
Average build
Pt shirtless./Average build
Average build

## 2024-12-13 NOTE — BH CONSULTATION LIAISON PROGRESS NOTE - NSBHFUPREASONCONS_PSY_A_CORE
agitation
agitation/med management
agitation/med management
agitation
agitation
agitation/med management
agitation/med management
suicidality/agitation
agitation
agitation
agitation/med management
agitation
agitation/med management
agitation/other...
agitation
med management

## 2024-12-13 NOTE — PROGRESS NOTE ADULT - ASSESSMENT
20 y/o M with PMH of autism, ADHD, history of episodes of agitation and aggression brought in by EMS from home for agitation.    Agitation/Aggression with hx of ADHD, autism  - Had a multidisciplinary meeting today with psychiatry, CM, nurse management regarding events from overnight  -Spoke with mom Skye as well  - Waiting for placement  -Continue 1:1  -Continue Lithium 600mg daily, 300mg qhs - changed to lithium 300mg tabs: 2 tabs in afternoon, 1 tab qhs per outpatient pharmacy request  -Continue Seroquel 300mg daily, 400mg qhs  -Continue Xanax 0.5mg Q8H PRN anxiety/restlessness - Psych appreciated for form completion  -Psych following, pending acceptance to [facility]/crisis housing. suggested use of vistaril (hydroxyzine 25mg q6h, prn anxiety as an alternative to benzos for for sleep)  -Klonopin 0.5mg at 6PM dc'd, hydroxyzine 25mg q6h prn started 12/7 - will not require upon discharge  -Repeat lithium level 12/9 reviewed  -Appreciate Social Work involvement     DVT PPx  -Patient is ambulatory    Dispo - stable for crisis housing today  D/c 45 min    -QuantiFeron gold negative 11/29 12/13 - Mother updated. All questions/concerns addressed

## 2024-12-13 NOTE — BH CONSULTATION LIAISON PROGRESS NOTE - NSBHDSMAXES_PSY_ALL_CORE
See above

## 2024-12-13 NOTE — BH CONSULTATION LIAISON PROGRESS NOTE - NSBHCONSFOLLOWNEEDS_PSY_ALL_CORE
Needs further psych safety assessment prior to discharge
No psychiatric contraindications to discharge
Needs further psych safety assessment prior to discharge
Needs further psych safety assessment prior to discharge
No psychiatric contraindications to discharge
Needs further psych safety assessment prior to discharge
No psychiatric contraindications to discharge
Needs further psych safety assessment prior to discharge
Needs further psych safety assessment prior to discharge

## 2024-12-13 NOTE — BH CONSULTATION LIAISON PROGRESS NOTE - NSBHATTESTBILLING_PSY_A_CORE
21182-Eolkbykhtm OBS or IP - moderate complexity OR 35-49 mins
[id number]-Witkootztw OBS or IP - low complexity OR 25-34 mins
[id number]-Kzcliltocm OBS or IP - moderate complexity OR 35-49 mins
[id number]-Ibqybyuzrt OBS or IP - low complexity OR 25-34 mins
72876-Wbnkmkogjz OBS or IP - high complexity OR 50-79 mins
76126-Ihqfutegxg OBS or IP - low complexity OR 25-34 mins
[id number]-Naxuodkhth OBS or IP - low complexity OR 25-34 mins
43033-Ebblzdxxgg OBS or IP - moderate complexity OR 35-49 mins
94038-Xzmtsujgzl OBS or IP - moderate complexity OR 35-49 mins
20353-Szpazhtxhi OBS or IP - moderate complexity OR 35-49 mins
69766-Noikeopqcq OBS or IP - low complexity OR 25-34 mins
[id number]-Rprtovdbuf OBS or IP - moderate complexity OR 35-49 mins
85792-Hcxdktfbbt OBS or IP - high complexity OR 50-79 mins
32602-Csbjenrwls OBS or IP - moderate complexity OR 35-49 mins
44572-Obtssfkvzw OBS or IP - high complexity OR 50-79 mins
[id number]-Rxanscxggj OBS or IP - high complexity OR 50-79 mins
67044-Hkvmoynglw OBS or IP - moderate complexity OR 35-49 mins
24359-Ovyukqerub OBS or IP - moderate complexity OR 35-49 mins
[id number]-Wbhqapmtzw OBS or IP - low complexity OR 25-34 mins

## 2024-12-13 NOTE — BH CONSULTATION LIAISON PROGRESS NOTE - NSBHCHARTREVIEWVS_PSY_A_CORE FT
Vital Signs Last 24 Hrs  T(C): --  T(F): --  HR: --  BP: --  BP(mean): --  RR: --  SpO2: --    
Vital Signs Last 24 Hrs  T(C): 36.6 (10 Nov 2024 12:06), Max: 37.1 (09 Nov 2024 19:46)  T(F): 97.9 (10 Nov 2024 12:06), Max: 98.7 (09 Nov 2024 19:46)  HR: 81 (10 Nov 2024 18:39) (81 - 102)  BP: 110/71 (10 Nov 2024 18:39) (110/71 - 128/81)  BP(mean): --  RR: 16 (10 Nov 2024 18:39) (16 - 16)  SpO2: 94% (10 Nov 2024 18:39) (93% - 95%)    Parameters below as of 10 Nov 2024 18:39  Patient On (Oxygen Delivery Method): room air    
Vital Signs Last 24 Hrs  T(C): 36.4 (07 Nov 2024 18:26), Max: 36.8 (07 Nov 2024 06:42)  T(F): 97.6 (07 Nov 2024 18:26), Max: 98.3 (07 Nov 2024 12:32)  HR: 89 (07 Nov 2024 18:26) (89 - 130)  BP: 130/81 (07 Nov 2024 18:26) (109/63 - 136/83)  BP(mean): --  RR: 15 (07 Nov 2024 18:26) (15 - 17)  SpO2: 95% (07 Nov 2024 18:26) (94% - 96%)    Parameters below as of 07 Nov 2024 18:26  Patient On (Oxygen Delivery Method): room air    
Vital Signs Last 24 Hrs  T(C): 36.9 (22 Oct 2024 05:31), Max: 37.2 (21 Oct 2024 17:27)  T(F): 98.5 (22 Oct 2024 05:31), Max: 99 (21 Oct 2024 17:27)  HR: 99 (22 Oct 2024 05:31) (87 - 99)  BP: 112/78 (22 Oct 2024 05:31) (112/78 - 124/75)  BP(mean): --  RR: 18 (22 Oct 2024 05:31) (18 - 18)  SpO2: 93% (22 Oct 2024 05:31) (93% - 93%)    Parameters below as of 22 Oct 2024 05:31  Patient On (Oxygen Delivery Method): room air    
Vital Signs Last 24 Hrs  T(C): 36.7 (04 Dec 2024 06:22), Max: 36.7 (04 Dec 2024 06:22)  T(F): 98.1 (04 Dec 2024 06:22), Max: 98.1 (04 Dec 2024 06:22)  HR: 97 (04 Dec 2024 06:22) (97 - 108)  BP: 115/77 (04 Dec 2024 06:22) (115/77 - 129/77)  BP(mean): --  RR: 16 (04 Dec 2024 06:22) (16 - 18)  SpO2: 94% (04 Dec 2024 06:22) (94% - 98%)    Parameters below as of 04 Dec 2024 06:22  Patient On (Oxygen Delivery Method): room air    
Vital Signs Last 24 Hrs  T(C): 36.2 (29 Nov 2024 19:00), Max: 36.4 (29 Nov 2024 05:00)  T(F): 97.2 (29 Nov 2024 19:00), Max: 97.5 (29 Nov 2024 05:00)  HR: 134 (29 Nov 2024 19:00) (107 - 134)  BP: 151/94 (29 Nov 2024 19:00) (119/78 - 151/94)  BP(mean): --  RR: 17 (29 Nov 2024 19:00) (17 - 18)  SpO2: 97% (29 Nov 2024 19:00) (96% - 97%)    Parameters below as of 29 Nov 2024 19:00  Patient On (Oxygen Delivery Method): room air    
Vital Signs Last 24 Hrs  T(C): 37.7 (20 Nov 2024 12:46), Max: 37.7 (20 Nov 2024 12:46)  T(F): 99.9 (20 Nov 2024 12:46), Max: 99.9 (20 Nov 2024 12:46)  HR: 148 (20 Nov 2024 12:46) (132 - 148)  BP: 137/89 (20 Nov 2024 12:46) (137/89 - 146/91)  BP(mean): --  RR: 17 (20 Nov 2024 12:46) (16 - 17)  SpO2: 96% (20 Nov 2024 12:46) (94% - 96%)    Parameters below as of 20 Nov 2024 12:46  Patient On (Oxygen Delivery Method): room air    
Vital Signs Last 24 Hrs  T(C): 36.6 (23 Nov 2024 13:57), Max: 36.7 (22 Nov 2024 21:17)  T(F): 97.9 (23 Nov 2024 13:57), Max: 98.1 (22 Nov 2024 21:17)  HR: 131 (23 Nov 2024 13:57) (97 - 131)  BP: 128/87 (23 Nov 2024 13:57) (117/74 - 143/86)  BP(mean): --  RR: 20 (23 Nov 2024 13:57) (15 - 20)  SpO2: 94% (23 Nov 2024 13:57) (94% - 96%)    Parameters below as of 23 Nov 2024 05:00  Patient On (Oxygen Delivery Method): room air    
Vital Signs Last 24 Hrs  T(C): 36.6 (09 Nov 2024 05:55), Max: 36.6 (09 Nov 2024 05:55)  T(F): 97.8 (09 Nov 2024 05:55), Max: 97.8 (09 Nov 2024 05:55)  HR: 81 (09 Nov 2024 05:55) (81 - 81)  BP: 107/61 (09 Nov 2024 05:55) (107/61 - 107/61)  BP(mean): --  RR: 16 (09 Nov 2024 05:55) (16 - 16)  SpO2: 95% (09 Nov 2024 05:55) (95% - 95%)    Parameters below as of 09 Nov 2024 05:55  Patient On (Oxygen Delivery Method): room air    
Vital Signs Last 24 Hrs  T(C): 36.4 (06 Dec 2024 18:54), Max: 36.9 (06 Dec 2024 13:50)  T(F): 97.5 (06 Dec 2024 18:54), Max: 98.4 (06 Dec 2024 13:50)  HR: 107 (06 Dec 2024 18:54) (107 - 111)  BP: 138/81 (06 Dec 2024 18:54) (133/80 - 138/81)  BP(mean): --  RR: 16 (06 Dec 2024 18:54) (16 - 18)  SpO2: 97% (06 Dec 2024 18:54) (96% - 97%)    Parameters below as of 06 Dec 2024 18:54  Patient On (Oxygen Delivery Method): room air    
Vital Signs Last 24 Hrs  T(C): 36.8 (22 Nov 2024 04:15), Max: 36.8 (22 Nov 2024 04:15)  T(F): 98.2 (22 Nov 2024 04:15), Max: 98.2 (22 Nov 2024 04:15)  HR: 86 (22 Nov 2024 13:00) (86 - 121)  BP: 117/90 (22 Nov 2024 13:00) (97/64 - 135/86)  BP(mean): 98 (22 Nov 2024 13:00) (76 - 113)  RR: 24 (22 Nov 2024 13:00) (9 - 25)  SpO2: 93% (22 Nov 2024 13:00) (91% - 98%)    Parameters below as of 22 Nov 2024 06:45  Patient On (Oxygen Delivery Method): room air    
Vital Signs Last 24 Hrs  T(C): 36.3 (08 Nov 2024 13:39), Max: 36.7 (07 Nov 2024 19:34)  T(F): 97.3 (08 Nov 2024 13:39), Max: 98 (07 Nov 2024 19:34)  HR: 86 (08 Nov 2024 13:39) (72 - 89)  BP: 114/72 (08 Nov 2024 13:39) (111/74 - 130/81)  BP(mean): --  RR: 16 (08 Nov 2024 13:39) (15 - 16)  SpO2: 96% (08 Nov 2024 13:39) (94% - 96%)    Parameters below as of 08 Nov 2024 13:39  Patient On (Oxygen Delivery Method): room air    
Vital Signs Last 24 Hrs  T(C): 36.4 (11 Nov 2024 12:56), Max: 36.4 (11 Nov 2024 12:56)  T(F): 97.6 (11 Nov 2024 12:56), Max: 97.6 (11 Nov 2024 12:56)  HR: 102 (11 Nov 2024 12:56) (76 - 102)  BP: 121/81 (11 Nov 2024 12:56) (110/71 - 121/81)  BP(mean): --  RR: 16 (11 Nov 2024 12:56) (16 - 16)  SpO2: 92% (11 Nov 2024 12:56) (92% - 96%)    Parameters below as of 11 Nov 2024 12:56  Patient On (Oxygen Delivery Method): room air    
Vital Signs Last 24 Hrs  T(C): 36.3 (30 Nov 2024 13:05), Max: 36.3 (30 Nov 2024 05:30)  T(F): 97.3 (30 Nov 2024 13:05), Max: 97.4 (30 Nov 2024 05:30)  HR: 100 (30 Nov 2024 13:05) (91 - 100)  BP: 126/87 (30 Nov 2024 13:05) (105/69 - 126/87)  BP(mean): --  RR: 16 (30 Nov 2024 13:05) (16 - 18)  SpO2: 98% (30 Nov 2024 13:05) (98% - 98%)    
Vital Signs Last 24 Hrs  T(C): 36.2 (03 Dec 2024 05:40), Max: 36.4 (02 Dec 2024 19:43)  T(F): 97.2 (03 Dec 2024 05:40), Max: 97.6 (02 Dec 2024 19:43)  HR: 95 (03 Dec 2024 05:40) (95 - 125)  BP: 105/65 (03 Dec 2024 05:40) (105/65 - 137/82)  BP(mean): --  RR: 17 (03 Dec 2024 05:40) (16 - 17)  SpO2: 96% (03 Dec 2024 05:40) (96% - 96%)    Parameters below as of 02 Dec 2024 19:43  Patient On (Oxygen Delivery Method): room air    
Vital Signs Last 24 Hrs  T(C): 36.4 (06 Dec 2024 18:54), Max: 36.9 (06 Dec 2024 13:50)  T(F): 97.5 (06 Dec 2024 18:54), Max: 98.4 (06 Dec 2024 13:50)  HR: 107 (06 Dec 2024 18:54) (97 - 111)  BP: 138/81 (06 Dec 2024 18:54) (128/81 - 138/81)  BP(mean): --  RR: 16 (06 Dec 2024 18:54) (16 - 18)  SpO2: 97% (06 Dec 2024 18:54) (96% - 97%)    Parameters below as of 06 Dec 2024 18:54  Patient On (Oxygen Delivery Method): room air    
Vital Signs Last 24 Hrs  T(C): 36.6 (24 Oct 2024 13:54), Max: 36.7 (23 Oct 2024 19:08)  T(F): 97.8 (24 Oct 2024 13:54), Max: 98.1 (23 Oct 2024 19:08)  HR: 100 (24 Oct 2024 13:54) (69 - 100)  BP: 136/86 (24 Oct 2024 13:54) (109/68 - 136/86)  BP(mean): --  RR: 16 (24 Oct 2024 13:54) (16 - 17)  SpO2: 93% (24 Oct 2024 13:54) (93% - 96%)    Parameters below as of 24 Oct 2024 13:54  Patient On (Oxygen Delivery Method): room air    
Vital Signs Last 24 Hrs  T(C): 36.6 (10 Dec 2024 05:00), Max: 37.1 (09 Dec 2024 20:00)  T(F): 97.9 (10 Dec 2024 05:00), Max: 98.7 (09 Dec 2024 20:00)  HR: 113 (10 Dec 2024 05:00) (113 - 117)  BP: 123/76 (10 Dec 2024 05:00) (123/76 - 128/73)  BP(mean): --  RR: 17 (10 Dec 2024 05:00) (17 - 18)  SpO2: 95% (10 Dec 2024 05:00) (95% - 97%)    
Vital Signs Last 24 Hrs  T(C): --  T(F): --  HR: --  BP: --  BP(mean): --  RR: --  SpO2: --

## 2024-12-13 NOTE — BH CONSULTATION LIAISON PROGRESS NOTE - NSBHCONSULTPRIMARYDISCUSSYES_PSY_A_CORE FT
care coordinated with treatment team for smooth transition this am from hospital setting to [facility] in [place].

## 2024-12-13 NOTE — BH CONSULTATION LIAISON PROGRESS NOTE - NSBHMSETHTPROC_PSY_A_CORE
Linear/Impaired reasoning
Linear/Impaired reasoning
Impaired reasoning
Linear
Impaired reasoning
Linear/Impaired reasoning
Impaired reasoning
Impaired reasoning/Other
Linear/Impaired reasoning
Impaired reasoning
Perseverative
Impaired reasoning
Impaired reasoning
Linear/Impaired reasoning
Impaired reasoning
Linear
Impaired reasoning

## 2024-12-13 NOTE — BH CONSULTATION LIAISON PROGRESS NOTE - NS ED BHA REVIEW OF ED CHART AVAILABLE INVESTIGATIONS REVIEWED
Yes
Yes
None available
Yes
None available
Yes
None available

## 2024-12-13 NOTE — CHART NOTE - NSCHARTNOTEFT_GEN_A_CORE
Patient discharged earlier today  He does not require ativan (lorazepam) at this point in time as per psychiatry recommendations; it has been discontinued    HE received ativan 2mg IM one time this morning prior to leaving the hospital but will not require further doses.

## 2024-12-13 NOTE — BH CONSULTATION LIAISON PROGRESS NOTE - PRN MEDS
Haldol 5mg, Ativan 4mg, Benadryl 50mg. 
Ativan last night for restlessness. 
Ativan last night for restlessness. 
Pt took medications offered to him prior to discharge, Haldol, Ativan and Benadryl is not sedated and is calm. 
Ativan 1 mg IM
Pt has not needed IM prn's but did take Alprazolam. 
Alprazolam
Olanzapine 5 mg   Ativan 2 mg
Ativan 2 mg
Alprazolam 0.5mg 
ativan 2 mg 
Xanax 
Xanax 0.5mg   Ativan 1mg

## 2024-12-13 NOTE — BH CONSULTATION LIAISON PROGRESS NOTE - NSBHMSEKNOW_PSY_A_CORE
Impaired
Normal
Normal
Pt reads well but will fill in the blanks incorrectly if not sure./Normal
Normal
Normal
Impaired
Pt reads well but will fill in the blanks incorrectly if not sure./Normal
Normal
Impaired
Pt reads well but will fill in the blanks incorrectly if not sure./Normal
Normal
Unable to assess
Normal
Unable to assess
Impaired
Impaired
Pt reads well but will fill in the blanks incorrectly if not sure./Normal
Impaired

## 2024-12-13 NOTE — BH CONSULTATION LIAISON PROGRESS NOTE - NSBHMSEIMPULSE_PSY_A_CORE
Impaired
Normal
controlled environment, with low stimulation and demands./Normal
Impaired
Normal
Impaired

## 2024-12-13 NOTE — BH CONSULTATION LIAISON PROGRESS NOTE - NSBHMSEPERCEPT_PSY_A_CORE
No abnormalities
Unable to assess
No abnormalities

## 2024-12-13 NOTE — BH CONSULTATION LIAISON PROGRESS NOTE - NS ED BHA REVIEW OF ED CHART AVAILABLE LABS REVIEWED
Yes
None available
None available
Yes
None available
Yes
Yes
None available
None available
Yes
None available

## 2024-12-13 NOTE — BH CONSULTATION LIAISON PROGRESS NOTE - NSBHMSEGAIT_PSY_A_CORE
Normal gait / station
Other
Normal gait / station
Normal gait / station
Other
Normal gait / station

## 2024-12-13 NOTE — DISCHARGE NOTE NURSING/CASE MANAGEMENT/SOCIAL WORK - FINANCIAL ASSISTANCE
[facility] provides services at a reduced cost to those who are determined to be eligible through [facility]’s financial assistance program. Information regarding [facility]’s financial assistance program can be found by going to [email or web address] or by calling [phone].

## 2024-12-13 NOTE — BH CONSULTATION LIAISON PROGRESS NOTE - NSBHCONSULTRECOMMENDOTHER_PSY_A_CORE FT
Continue Lithium total dose 900 mg  Continue Quetiapine total dose 700 mg  Consider adding Propranolol for aggression vs Clonidine.   Psychiatric services to be provided by [facility].

## 2024-12-13 NOTE — BH CONSULTATION LIAISON PROGRESS NOTE - NSBHMSELANG_PSY_A_CORE
No abnormalities noted
Impaired repetition
No abnormalities noted

## 2024-12-13 NOTE — PROGRESS NOTE ADULT - REASON FOR ADMISSION
Placement

## 2024-12-13 NOTE — DISCHARGE NOTE NURSING/CASE MANAGEMENT/SOCIAL WORK - NSDCPEFALRISK_GEN_ALL_CORE
For information on Fall & Injury Prevention, visit: [email or web address] OR  [email or web address] OR  https://www.cdc.gov/steadi/patient.html

## 2024-12-13 NOTE — BH CONSULTATION LIAISON PROGRESS NOTE - NSBHMSEMOOD_PSY_A_CORE
Normal
Anxious
Normal
Anxious
Irritable
Normal
Depressed/Anxious
Anxious
Normal
Anxious
Normal
Depressed/Anxious
Normal
Unable to assess

## 2024-12-13 NOTE — BH CONSULTATION LIAISON PROGRESS NOTE - NSBHPSYCHOLCOGORIENT_PSY_A_CORE
Oriented to time, place, person, situation
Not fully oriented...
Oriented to time, place, person, situation
Not fully oriented...
Oriented to time, place, person, situation
Not fully oriented...
Oriented to time, place, person, situation
Oriented to time, place, person, situation

## 2025-07-25 NOTE — PATIENT PROFILE ADULT - FLU SEASON?
Do not use Right arm/wrist for 24 hours.  Do not lift greater than 3-5 pounds on right arm for 5 days.   You may shower and remove dressing in 24 hours.  Do not drive or drink alcohol for 24 hours.     Driving: If you are discharged home the same day as your procedure, do not drive for 24 hours due to the relaxing medications you received during your procedure.    Bathing: After 24 hours, remove the dressing and shower normally. Gently clean puncture site with soap and water. Pat Dry; do not rub the skin over your puncture site. Do not submerge the puncture site in water in a tub bath, whirl-pool, or swimming pool until the skin is completely healed (usually about 5 days). If your puncture site is on your wrist, you should avoid dishwater until the site is healed. Soaking in water can increase the risk of infection at the insertion site.     Activity: To allow your artery to heal, limit the use of your affected arm/wrist for 24 hours. Avoid housework and strenuous activities for 5 days.  - Do not lift more than 3-5 pounds.  - Do not operate a lawnmower, , motorcycle, all terrain vehicle of power tools.  - Avoid excessive wrist movement (bending in either direction); do not use your affected hand/arm to support your weight when rising from chair or bed; no push ups, lifting garage doors, etc.   - Do not engage in vigorous exercise such as bowling, tennis, or golf.     Call your cardiology provider immediately if you notice any of the following signs of infection.  -Drainage at your puncture site.  -Warmth at your puncture site.  -Redness at your puncture site.  -Increased temperature (Greater than 100 Degrees F)                You may experience mild tingling in your hand and tenderness at the puncture site for up to 3 days; this is normal. If this worsens or persists longer than 3 days, contact your doctor immediately. If you notice any increase in bruising, pain, swelling, or numbness at the puncture  site call your cardiology provider immediately. If you experience bleeding, press down hard with your fingers directly on your puncture site and call 911 immediately. Do not drive yourself to the hospital.     If for any reason you are unable to reach your cardiology provider and you are concerned that you may be having a problem related to your cardiac catheterization, come to the Emergency Department or call 911.    Yes...